# Patient Record
Sex: MALE | Race: OTHER | HISPANIC OR LATINO | URBAN - METROPOLITAN AREA
[De-identification: names, ages, dates, MRNs, and addresses within clinical notes are randomized per-mention and may not be internally consistent; named-entity substitution may affect disease eponyms.]

---

## 2021-04-13 ENCOUNTER — INPATIENT (INPATIENT)
Facility: HOSPITAL | Age: 81
LOS: 5 days | Discharge: ANOTHER HOSPITAL | DRG: 305 | End: 2021-04-19
Attending: INTERNAL MEDICINE | Admitting: INTERNAL MEDICINE
Payer: COMMERCIAL

## 2021-04-13 VITALS
SYSTOLIC BLOOD PRESSURE: 175 MMHG | DIASTOLIC BLOOD PRESSURE: 85 MMHG | RESPIRATION RATE: 18 BRPM | OXYGEN SATURATION: 98 % | HEART RATE: 77 BPM | TEMPERATURE: 98 F | HEIGHT: 60 IN

## 2021-04-13 DIAGNOSIS — R07.9 CHEST PAIN, UNSPECIFIED: ICD-10-CM

## 2021-04-13 DIAGNOSIS — I10 ESSENTIAL (PRIMARY) HYPERTENSION: ICD-10-CM

## 2021-04-13 DIAGNOSIS — Z29.9 ENCOUNTER FOR PROPHYLACTIC MEASURES, UNSPECIFIED: ICD-10-CM

## 2021-04-13 LAB
ALBUMIN SERPL ELPH-MCNC: 3.6 G/DL — SIGNIFICANT CHANGE UP (ref 3.5–5)
ALP SERPL-CCNC: 60 U/L — SIGNIFICANT CHANGE UP (ref 40–120)
ALT FLD-CCNC: 52 U/L DA — SIGNIFICANT CHANGE UP (ref 10–60)
ANION GAP SERPL CALC-SCNC: 5 MMOL/L — SIGNIFICANT CHANGE UP (ref 5–17)
AST SERPL-CCNC: 29 U/L — SIGNIFICANT CHANGE UP (ref 10–40)
BASOPHILS # BLD AUTO: 0 K/UL — SIGNIFICANT CHANGE UP (ref 0–0.2)
BASOPHILS NFR BLD AUTO: 0 % — SIGNIFICANT CHANGE UP (ref 0–2)
BILIRUB SERPL-MCNC: 0.5 MG/DL — SIGNIFICANT CHANGE UP (ref 0.2–1.2)
BUN SERPL-MCNC: 16 MG/DL — SIGNIFICANT CHANGE UP (ref 7–18)
CALCIUM SERPL-MCNC: 9 MG/DL — SIGNIFICANT CHANGE UP (ref 8.4–10.5)
CHLORIDE SERPL-SCNC: 106 MMOL/L — SIGNIFICANT CHANGE UP (ref 96–108)
CO2 SERPL-SCNC: 27 MMOL/L — SIGNIFICANT CHANGE UP (ref 22–31)
CREAT SERPL-MCNC: 0.81 MG/DL — SIGNIFICANT CHANGE UP (ref 0.5–1.3)
EOSINOPHIL # BLD AUTO: 0.12 K/UL — SIGNIFICANT CHANGE UP (ref 0–0.5)
EOSINOPHIL NFR BLD AUTO: 2 % — SIGNIFICANT CHANGE UP (ref 0–6)
GLUCOSE SERPL-MCNC: 119 MG/DL — HIGH (ref 70–99)
HCT VFR BLD CALC: 41.3 % — SIGNIFICANT CHANGE UP (ref 39–50)
HGB BLD-MCNC: 14.2 G/DL — SIGNIFICANT CHANGE UP (ref 13–17)
LYMPHOCYTES # BLD AUTO: 2.09 K/UL — SIGNIFICANT CHANGE UP (ref 1–3.3)
LYMPHOCYTES # BLD AUTO: 35 % — SIGNIFICANT CHANGE UP (ref 13–44)
MCHC RBC-ENTMCNC: 32.4 PG — SIGNIFICANT CHANGE UP (ref 27–34)
MCHC RBC-ENTMCNC: 34.4 GM/DL — SIGNIFICANT CHANGE UP (ref 32–36)
MCV RBC AUTO: 94.3 FL — SIGNIFICANT CHANGE UP (ref 80–100)
MONOCYTES # BLD AUTO: 0.54 K/UL — SIGNIFICANT CHANGE UP (ref 0–0.9)
MONOCYTES NFR BLD AUTO: 9 % — SIGNIFICANT CHANGE UP (ref 2–14)
NEUTROPHILS # BLD AUTO: 2.87 K/UL — SIGNIFICANT CHANGE UP (ref 1.8–7.4)
NEUTROPHILS NFR BLD AUTO: 48 % — SIGNIFICANT CHANGE UP (ref 43–77)
PLATELET # BLD AUTO: 152 K/UL — SIGNIFICANT CHANGE UP (ref 150–400)
POTASSIUM SERPL-MCNC: 3.7 MMOL/L — SIGNIFICANT CHANGE UP (ref 3.5–5.3)
POTASSIUM SERPL-SCNC: 3.7 MMOL/L — SIGNIFICANT CHANGE UP (ref 3.5–5.3)
PROT SERPL-MCNC: 7.9 G/DL — SIGNIFICANT CHANGE UP (ref 6–8.3)
RBC # BLD: 4.38 M/UL — SIGNIFICANT CHANGE UP (ref 4.2–5.8)
RBC # FLD: 13.3 % — SIGNIFICANT CHANGE UP (ref 10.3–14.5)
SARS-COV-2 RNA SPEC QL NAA+PROBE: SIGNIFICANT CHANGE UP
SODIUM SERPL-SCNC: 138 MMOL/L — SIGNIFICANT CHANGE UP (ref 135–145)
TROPONIN I SERPL-MCNC: <0.015 NG/ML — SIGNIFICANT CHANGE UP (ref 0–0.04)
WBC # BLD: 5.98 K/UL — SIGNIFICANT CHANGE UP (ref 3.8–10.5)
WBC # FLD AUTO: 5.98 K/UL — SIGNIFICANT CHANGE UP (ref 3.8–10.5)

## 2021-04-13 PROCEDURE — 71045 X-RAY EXAM CHEST 1 VIEW: CPT | Mod: 26

## 2021-04-13 PROCEDURE — 99285 EMERGENCY DEPT VISIT HI MDM: CPT

## 2021-04-13 RX ORDER — AMLODIPINE BESYLATE 2.5 MG/1
5 TABLET ORAL DAILY
Refills: 0 | Status: DISCONTINUED | OUTPATIENT
Start: 2021-04-13 | End: 2021-04-19

## 2021-04-13 RX ORDER — HYDRALAZINE HCL 50 MG
5 TABLET ORAL ONCE
Refills: 0 | Status: COMPLETED | OUTPATIENT
Start: 2021-04-13 | End: 2021-04-13

## 2021-04-13 RX ORDER — METOPROLOL TARTRATE 50 MG
12.5 TABLET ORAL EVERY 12 HOURS
Refills: 0 | Status: DISCONTINUED | OUTPATIENT
Start: 2021-04-13 | End: 2021-04-15

## 2021-04-13 RX ORDER — ASPIRIN/CALCIUM CARB/MAGNESIUM 324 MG
81 TABLET ORAL DAILY
Refills: 0 | Status: DISCONTINUED | OUTPATIENT
Start: 2021-04-13 | End: 2021-04-19

## 2021-04-13 RX ORDER — ATORVASTATIN CALCIUM 80 MG/1
40 TABLET, FILM COATED ORAL AT BEDTIME
Refills: 0 | Status: DISCONTINUED | OUTPATIENT
Start: 2021-04-13 | End: 2021-04-19

## 2021-04-13 RX ORDER — ENOXAPARIN SODIUM 100 MG/ML
40 INJECTION SUBCUTANEOUS DAILY
Refills: 0 | Status: DISCONTINUED | OUTPATIENT
Start: 2021-04-13 | End: 2021-04-19

## 2021-04-13 RX ADMIN — ATORVASTATIN CALCIUM 40 MILLIGRAM(S): 80 TABLET, FILM COATED ORAL at 22:23

## 2021-04-13 RX ADMIN — Medication 12.5 MILLIGRAM(S): at 21:55

## 2021-04-13 RX ADMIN — AMLODIPINE BESYLATE 5 MILLIGRAM(S): 2.5 TABLET ORAL at 19:54

## 2021-04-13 RX ADMIN — Medication 5 MILLIGRAM(S): at 19:54

## 2021-04-13 NOTE — ED ADULT NURSE NOTE - ED STAT RN HANDOFF DETAILS
Report given to ZURI Mendosa. Pt resting in bed, denies chest pain, no acute distress noted, no shortness of breath indicated.

## 2021-04-13 NOTE — ED ADULT TRIAGE NOTE - CHIEF COMPLAINT QUOTE
brought by grand daughter for high blood pressure this morning as per VNS and some chest discomforts x few days

## 2021-04-13 NOTE — ED ADULT NURSE REASSESSMENT NOTE - NS ED NURSE REASSESS COMMENT FT1
Enhanced supervision maintained as per order. Pt resting in bed, no acute distress noted, denies chest pain, no shortness of breath indicated. Will continue to monitor.

## 2021-04-13 NOTE — ED ADULT NURSE NOTE - OBJECTIVE STATEMENT
Pt brought by grand daughter for high blood pressure this morning as per VNS and some chest discomfort x few days. Upon assessment, pt is A&OX2 with confusion, pt disorientated to time and situation. Pt denies chest pain, no shortness of breath indicated.

## 2021-04-13 NOTE — H&P ADULT - NSHPPHYSICALEXAM_GEN_ALL_CORE
General - NAD  Eyes - PERRLA, EOM intact  ENT - Nonicteric sclerae, PERRLA, EOMI. Oropharynx clear. Moist mucous membranes. Conjunctivae appear well perfused.   Neck - No noticeable or palpable swelling, redness or rash around throat or on face  Lymph Nodes - No lymphadenopathy  Cardiovascular - RRR no m/r/g, no JVD, no carotid bruits  Lungs - Clear to ascultation, no use of accessory muscles, no crackles or wheezes.  Skin - No rashes, skin warm and dry, no erythematous areas  Abdomen - Normal bowel sounds, abdomen soft and nontender, no hepatosplenomegaly.  Extremities - No edema, cyanosis or clubbing  MusculoSkeletal - 5/5 strength, normal range of motion, no swollen or erythematous  joints.  Neurological – Alert and oriented x1 -2, CN 2-12 grossly intact.

## 2021-04-13 NOTE — ED PROVIDER NOTE - OBJECTIVE STATEMENT
81 y/o male h/o Alzheimer's disease, HTN, BIB granddaughter. Pt was getting his annual home visit when the nurse found him to have a BP in the 180's systolic. Pt told the nurse he was having chest pain but told his granddaughter he was not having chest pain. Pt has alzheimer's and HTN, and is a poor historian and cannot contribute to his own history. History obtained from granddaughter. Pt reports he feels fine now and is asking for a taxi to go home. Pt lives in a 2 family home and lives on the first floor alone, and his granddaughter lives on the second floor and generally cares for him. Pt is supposed to take his BP medications at home but has been refusing to take them for many years.

## 2021-04-13 NOTE — ED ADULT NURSE NOTE - ED STAT RN HANDOFF DETAILS 2
Endorsed from RN Nancy pt awake restless getting out of bed telebox at bedside, placed on pt,  pt removes box pt disoriented

## 2021-04-13 NOTE — H&P ADULT - PROBLEM SELECTOR PLAN 1
Pt reports chest pain since morning  Unlikely ACS  EKG NSR  Troponin negative  Tele monitoring  started on aspirin and statin  f/u serial troponin  f/u Echo  Cardio  Pt reports chest pain since morning  Unlikely ACS  EKG NSR  Troponin negative  Tele monitoring  started on aspirin, statin and lopressor  f/u serial troponin  f/u Echo  Cardio

## 2021-04-13 NOTE — H&P ADULT - ASSESSMENT
81 y/o M with PMH of Alzheimer's disease, HTN, arthritis, depression  was brought by granddaughter due to chest pain and elevated BP.       In ED, /85, HR 77

## 2021-04-13 NOTE — ED ADULT NURSE NOTE - NSIMPLEMENTINTERV_GEN_ALL_ED
Implemented All Fall with Harm Risk Interventions:  [place] to call system. Call bell, personal items and telephone within reach. Instruct patient to call for assistance. Room bathroom lighting operational. Non-slip footwear when patient is off stretcher. Physically safe environment: no spills, clutter or unnecessary equipment. Stretcher in lowest position, wheels locked, appropriate side rails in place. Provide visual cue, wrist band, yellow gown, etc. Monitor gait and stability. Monitor for mental status changes and reorient to person, place, and time. Review medications for side effects contributing to fall risk. Reinforce activity limits and safety measures with patient and family. Provide visual clues: red socks.

## 2021-04-13 NOTE — H&P ADULT - PROBLEM SELECTOR PLAN 2
p/w /85  Pt is not on BP medications for many years  Started on amlodipine 5 mg  f/u Echo   Monitor BP p/w /85  Pt is not on BP medications for many years  Started on amlodipine 5 mg and lopressor 12.5 bid  f/u Echo   Monitor BP

## 2021-04-13 NOTE — H&P ADULT - HISTORY OF PRESENT ILLNESS
81 y/o M with PMH of Alzheimer's disease, HTN, arthritis, depression  was brought by granddaughter due to chest pain and elevated BP. Pt is a poor historian due to dementia, history obtained from granddaughter. Per granddaughter she was informed by the visiting nurse that the BP was 188/90. Pt told the nurse he was having chest pain but told his granddaughter he was not having chest pain but reported shortness of breath. Pt lives in a 2 family home and lives on the first floor alone, and his granddaughter lives on the second floor and generally cares for him. Pt is supposed to take his BP medications at home but has been refusing to take them for many years.     In ED, /85, HR 77
No complaints

## 2021-04-14 DIAGNOSIS — G30.9 ALZHEIMER'S DISEASE, UNSPECIFIED: ICD-10-CM

## 2021-04-14 DIAGNOSIS — Z02.9 ENCOUNTER FOR ADMINISTRATIVE EXAMINATIONS, UNSPECIFIED: ICD-10-CM

## 2021-04-14 DIAGNOSIS — Z71.89 OTHER SPECIFIED COUNSELING: ICD-10-CM

## 2021-04-14 LAB
A1C WITH ESTIMATED AVERAGE GLUCOSE RESULT: 6.4 % — HIGH (ref 4–5.6)
ALBUMIN SERPL ELPH-MCNC: 3.5 G/DL — SIGNIFICANT CHANGE UP (ref 3.5–5)
ALP SERPL-CCNC: 64 U/L — SIGNIFICANT CHANGE UP (ref 40–120)
ALT FLD-CCNC: 47 U/L DA — SIGNIFICANT CHANGE UP (ref 10–60)
ANION GAP SERPL CALC-SCNC: 7 MMOL/L — SIGNIFICANT CHANGE UP (ref 5–17)
AST SERPL-CCNC: 20 U/L — SIGNIFICANT CHANGE UP (ref 10–40)
BASOPHILS # BLD AUTO: 0.02 K/UL — SIGNIFICANT CHANGE UP (ref 0–0.2)
BASOPHILS NFR BLD AUTO: 0.2 % — SIGNIFICANT CHANGE UP (ref 0–2)
BILIRUB SERPL-MCNC: 1 MG/DL — SIGNIFICANT CHANGE UP (ref 0.2–1.2)
BUN SERPL-MCNC: 10 MG/DL — SIGNIFICANT CHANGE UP (ref 7–18)
CALCIUM SERPL-MCNC: 8.6 MG/DL — SIGNIFICANT CHANGE UP (ref 8.4–10.5)
CHLORIDE SERPL-SCNC: 105 MMOL/L — SIGNIFICANT CHANGE UP (ref 96–108)
CHOLEST SERPL-MCNC: 157 MG/DL — SIGNIFICANT CHANGE UP
CO2 SERPL-SCNC: 25 MMOL/L — SIGNIFICANT CHANGE UP (ref 22–31)
COVID-19 SPIKE DOMAIN AB INTERP: NEGATIVE — SIGNIFICANT CHANGE UP
COVID-19 SPIKE DOMAIN ANTIBODY RESULT: 0.4 U/ML — SIGNIFICANT CHANGE UP
CREAT SERPL-MCNC: 0.83 MG/DL — SIGNIFICANT CHANGE UP (ref 0.5–1.3)
EOSINOPHIL # BLD AUTO: 0.05 K/UL — SIGNIFICANT CHANGE UP (ref 0–0.5)
EOSINOPHIL NFR BLD AUTO: 0.5 % — SIGNIFICANT CHANGE UP (ref 0–6)
ESTIMATED AVERAGE GLUCOSE: 137 MG/DL — HIGH (ref 68–114)
GLUCOSE SERPL-MCNC: 163 MG/DL — HIGH (ref 70–99)
HCT VFR BLD CALC: 42.7 % — SIGNIFICANT CHANGE UP (ref 39–50)
HDLC SERPL-MCNC: 49 MG/DL — SIGNIFICANT CHANGE UP
HGB BLD-MCNC: 14.5 G/DL — SIGNIFICANT CHANGE UP (ref 13–17)
IMM GRANULOCYTES NFR BLD AUTO: 0.5 % — SIGNIFICANT CHANGE UP (ref 0–1.5)
LIPID PNL WITH DIRECT LDL SERPL: 86 MG/DL — SIGNIFICANT CHANGE UP
LYMPHOCYTES # BLD AUTO: 1.57 K/UL — SIGNIFICANT CHANGE UP (ref 1–3.3)
LYMPHOCYTES # BLD AUTO: 15.7 % — SIGNIFICANT CHANGE UP (ref 13–44)
MAGNESIUM SERPL-MCNC: 2.3 MG/DL — SIGNIFICANT CHANGE UP (ref 1.6–2.6)
MCHC RBC-ENTMCNC: 32.4 PG — SIGNIFICANT CHANGE UP (ref 27–34)
MCHC RBC-ENTMCNC: 34 GM/DL — SIGNIFICANT CHANGE UP (ref 32–36)
MCV RBC AUTO: 95.3 FL — SIGNIFICANT CHANGE UP (ref 80–100)
MONOCYTES # BLD AUTO: 1.09 K/UL — HIGH (ref 0–0.9)
MONOCYTES NFR BLD AUTO: 10.9 % — SIGNIFICANT CHANGE UP (ref 2–14)
NEUTROPHILS # BLD AUTO: 7.25 K/UL — SIGNIFICANT CHANGE UP (ref 1.8–7.4)
NEUTROPHILS NFR BLD AUTO: 72.2 % — SIGNIFICANT CHANGE UP (ref 43–77)
NON HDL CHOLESTEROL: 108 MG/DL — SIGNIFICANT CHANGE UP
NRBC # BLD: 0 /100 WBCS — SIGNIFICANT CHANGE UP (ref 0–0)
PHOSPHATE SERPL-MCNC: 1.7 MG/DL — LOW (ref 2.5–4.5)
PLATELET # BLD AUTO: 153 K/UL — SIGNIFICANT CHANGE UP (ref 150–400)
POTASSIUM SERPL-MCNC: 3.6 MMOL/L — SIGNIFICANT CHANGE UP (ref 3.5–5.3)
POTASSIUM SERPL-SCNC: 3.6 MMOL/L — SIGNIFICANT CHANGE UP (ref 3.5–5.3)
PROT SERPL-MCNC: 7.7 G/DL — SIGNIFICANT CHANGE UP (ref 6–8.3)
RBC # BLD: 4.48 M/UL — SIGNIFICANT CHANGE UP (ref 4.2–5.8)
RBC # FLD: 13.3 % — SIGNIFICANT CHANGE UP (ref 10.3–14.5)
SARS-COV-2 IGG+IGM SERPL QL IA: 0.4 U/ML — SIGNIFICANT CHANGE UP
SARS-COV-2 IGG+IGM SERPL QL IA: NEGATIVE — SIGNIFICANT CHANGE UP
SODIUM SERPL-SCNC: 137 MMOL/L — SIGNIFICANT CHANGE UP (ref 135–145)
TRIGL SERPL-MCNC: 108 MG/DL — SIGNIFICANT CHANGE UP
TROPONIN I SERPL-MCNC: 0.02 NG/ML — SIGNIFICANT CHANGE UP (ref 0–0.04)
TROPONIN I SERPL-MCNC: 0.04 NG/ML — SIGNIFICANT CHANGE UP (ref 0–0.04)
TSH SERPL-MCNC: 3.62 UU/ML — SIGNIFICANT CHANGE UP (ref 0.34–4.82)
WBC # BLD: 10.03 K/UL — SIGNIFICANT CHANGE UP (ref 3.8–10.5)
WBC # FLD AUTO: 10.03 K/UL — SIGNIFICANT CHANGE UP (ref 3.8–10.5)

## 2021-04-14 RX ORDER — SODIUM,POTASSIUM PHOSPHATES 278-250MG
1 POWDER IN PACKET (EA) ORAL
Refills: 0 | Status: COMPLETED | OUTPATIENT
Start: 2021-04-14 | End: 2021-04-15

## 2021-04-14 RX ORDER — PANTOPRAZOLE SODIUM 20 MG/1
40 TABLET, DELAYED RELEASE ORAL
Refills: 0 | Status: DISCONTINUED | OUTPATIENT
Start: 2021-04-14 | End: 2021-04-19

## 2021-04-14 RX ORDER — HALOPERIDOL DECANOATE 100 MG/ML
2 INJECTION INTRAMUSCULAR ONCE
Refills: 0 | Status: COMPLETED | OUTPATIENT
Start: 2021-04-14 | End: 2021-04-14

## 2021-04-14 RX ADMIN — ENOXAPARIN SODIUM 40 MILLIGRAM(S): 100 INJECTION SUBCUTANEOUS at 11:15

## 2021-04-14 RX ADMIN — Medication 1 TABLET(S): at 16:48

## 2021-04-14 RX ADMIN — Medication 81 MILLIGRAM(S): at 11:14

## 2021-04-14 RX ADMIN — Medication 1 TABLET(S): at 17:07

## 2021-04-14 RX ADMIN — HALOPERIDOL DECANOATE 2 MILLIGRAM(S): 100 INJECTION INTRAMUSCULAR at 16:50

## 2021-04-14 RX ADMIN — Medication 12.5 MILLIGRAM(S): at 05:17

## 2021-04-14 RX ADMIN — Medication 12.5 MILLIGRAM(S): at 17:06

## 2021-04-14 NOTE — PROGRESS NOTE ADULT - SUBJECTIVE AND OBJECTIVE BOX
NP Note discussed with  Primary Attending    Patient is a 80y old  Male who presents with a chief complaint of Elevated BP and chest pain (13 Apr 2021 19:19)      INTERVAL HPI/OVERNIGHT EVENTS: Patient seen and examined at bedside. Patient pleasantly confused, denies chest pain, no new complaints    MEDICATIONS  (STANDING):  amLODIPine   Tablet 5 milliGRAM(s) Oral daily  aspirin  chewable 81 milliGRAM(s) Oral daily  atorvastatin 40 milliGRAM(s) Oral at bedtime  enoxaparin Injectable 40 milliGRAM(s) SubCutaneous daily  metoprolol tartrate 12.5 milliGRAM(s) Oral every 12 hours    MEDICATIONS  (PRN):      __________________________________________________  REVIEW OF SYSTEMS:    CONSTITUTIONAL: No fever,   EYES: no acute visual disturbances  NECK: No pain or stiffness  RESPIRATORY: No cough; No shortness of breath  CARDIOVASCULAR: No chest pain, no palpitations  GASTROINTESTINAL: No pain. No nausea or vomiting; No diarrhea   NEUROLOGICAL: No headache or numbness, no tremors  MUSCULOSKELETAL: No joint pain, no muscle pain  GENITOURINARY: no dysuria, no frequency, no hesitancy  PSYCHIATRY: no depression , no anxiety  ALL OTHER  ROS negative        Vital Signs Last 24 Hrs  T(C): 36.4 (14 Apr 2021 12:28), Max: 37 (13 Apr 2021 23:21)  T(F): 97.5 (14 Apr 2021 12:28), Max: 98.6 (13 Apr 2021 23:21)  HR: 73 (14 Apr 2021 12:55) (68 - 159)  BP: 159/68 (14 Apr 2021 12:28) (154/72 - 201/87)  BP(mean): --  RR: 17 (14 Apr 2021 12:28) (17 - 18)  SpO2: 99% (14 Apr 2021 12:28) (98% - 99%)    ________________________________________________  PHYSICAL EXAM:  GENERAL: NAD  HEENT: Normocephalic;  conjunctivae and sclerae clear; moist mucous membranes;   NECK : supple  CHEST/LUNG: Clear to auscultation bilaterally with good air entry   HEART: S1 S2  regular; no murmurs, gallops or rubs  ABDOMEN: Soft, Nontender, Nondistended; Bowel sounds present  EXTREMITIES: no cyanosis; no edema; no calf tenderness  SKIN: warm and dry; no rash  NERVOUS SYSTEM:  Awake and alert; Oriented  to place, person and time ; no new deficits    _________________________________________________  LABS:                        14.5   10.03 )-----------( 153      ( 14 Apr 2021 05:49 )             42.7     04-14    137  |  105  |  10  ----------------------------<  163<H>  3.6   |  25  |  0.83    Ca    8.6      14 Apr 2021 05:49  Phos  1.7     04-14  Mg     2.3     04-14    TPro  7.7  /  Alb  3.5  /  TBili  1.0  /  DBili  x   /  AST  20  /  ALT  47  /  AlkPhos  64  04-14        CAPILLARY BLOOD GLUCOSE            RADIOLOGY & ADDITIONAL TESTS:    Imaging  Reviewed:  YES/NO    Consultant(s) Notes Reviewed:   YES/ No      Plan of care was discussed with patient and /or primary care giver; all questions and concerns were addressed  NP Note discussed with  Primary Attending    Patient is a 80y old  Male who presents with a chief complaint of Elevated BP and chest pain (13 Apr 2021 19:19)      INTERVAL HPI/OVERNIGHT EVENTS: Patient seen and examined at bedside. Patient pleasantly confused, denies chest pain, fever, palpitations, no new complaints    MEDICATIONS  (STANDING):  amLODIPine   Tablet 5 milliGRAM(s) Oral daily  aspirin  chewable 81 milliGRAM(s) Oral daily  atorvastatin 40 milliGRAM(s) Oral at bedtime  enoxaparin Injectable 40 milliGRAM(s) SubCutaneous daily  metoprolol tartrate 12.5 milliGRAM(s) Oral every 12 hours    MEDICATIONS  (PRN):      __________________________________________________  REVIEW OF SYSTEMS:    CONSTITUTIONAL: No fever,   EYES: no acute visual disturbances  NECK: No pain or stiffness  RESPIRATORY: No cough; No shortness of breath  CARDIOVASCULAR: No chest pain, no palpitations  GASTROINTESTINAL: No pain. No nausea or vomiting; No diarrhea   NEUROLOGICAL: No headache or numbness, no tremors  MUSCULOSKELETAL: No joint pain, no muscle pain  GENITOURINARY: no dysuria, no frequency, no hesitancy  PSYCHIATRY: no depression , no anxiety  ALL OTHER  ROS negative        Vital Signs Last 24 Hrs  T(C): 36.4 (14 Apr 2021 12:28), Max: 37 (13 Apr 2021 23:21)  T(F): 97.5 (14 Apr 2021 12:28), Max: 98.6 (13 Apr 2021 23:21)  HR: 73 (14 Apr 2021 12:55) (68 - 159)  BP: 159/68 (14 Apr 2021 12:28) (154/72 - 201/87)  BP(mean): --  RR: 17 (14 Apr 2021 12:28) (17 - 18)  SpO2: 99% (14 Apr 2021 12:28) (98% - 99%)    ________________________________________________  PHYSICAL EXAM:  GENERAL: NAD  HEENT: Normocephalic;  conjunctivae and sclerae clear; moist mucous membranes;   NECK : supple  CHEST/LUNG: Clear to auscultation bilaterally with good air entry   HEART: S1 S2  regular; no murmurs, gallops or rubs  ABDOMEN: Soft, Nontender, Nondistended; Bowel sounds present  EXTREMITIES: no cyanosis; no edema; no calf tenderness  SKIN: warm and dry; no rash  NERVOUS SYSTEM:  Awake and alert; Oriented  to person, pleasantly confused    _________________________________________________  LABS:                        14.5   10.03 )-----------( 153      ( 14 Apr 2021 05:49 )             42.7     04-14    137  |  105  |  10  ----------------------------<  163<H>  3.6   |  25  |  0.83    Ca    8.6      14 Apr 2021 05:49  Phos  1.7     04-14  Mg     2.3     04-14    TPro  7.7  /  Alb  3.5  /  TBili  1.0  /  DBili  x   /  AST  20  /  ALT  47  /  AlkPhos  64  04-14        CAPILLARY BLOOD GLUCOSE    RADIOLOGY & ADDITIONAL TESTS:  < from: Xray Chest 1 View- PORTABLE-Urgent (04.13.21 @ 18:27) >  EXAM:  XR CHEST PORTABLE URGENT 1V                            PROCEDURE DATE:  04/13/2021          INTERPRETATION:  AP chest on April 13, 2021 at 6:33 PM. Patient has chest pain.    Patient is also hypertensive.    COMPARISON: None available.    Heart size is within normal limits.    The lung fields and pleural surfaces are unremarkable.    IMPRESSION: Negative chest.    < end of copied text >    Imaging  Reviewed:  YES    Consultant(s) Notes Reviewed:   YES      Plan of care was discussed with patient and /or primary care giver; all questions and concerns were addressed

## 2021-04-14 NOTE — PATIENT PROFILE ADULT - STATED REASON FOR ADMISSION
Granddaughter reports that patient ran out of medications and has been refusing to go to the doctor.

## 2021-04-14 NOTE — PROGRESS NOTE ADULT - CONVERSATION DETAILS
Spoke in full detail with Kirsten Ruano [phone]. Patient daughter expressed patient did not want to be resuscitated and ventilated. Patient daughter express for limited intervention with trial of IV fluid, use antibiotics as medically warranted, no feeding tube. MOLST in chart. explained that MOLST can be modified at anytime.

## 2021-04-14 NOTE — PATIENT PROFILE ADULT - NSPROHMSYMPCOND_GEN_A_NUR
granddaughter reports refusal to go to the doctor and take medications./cardiovascular granddaughter reports refusal to go to the doctor and take medications./cardiovascular/diabetes

## 2021-04-14 NOTE — PROGRESS NOTE ADULT - PROBLEM SELECTOR PLAN 5
-PT consult pending  -f/u stress test and ECHO -PT consult pending  -f/u stress test and ECHO  -Pharmacy information up to date. Patient has not been on medication as patient has refused to take medication.

## 2021-04-14 NOTE — PATIENT PROFILE ADULT - NSPROGENSOURCEINFO_GEN_A_NUR
patient approached but was only oriented to person and had no understanding of why he was in the hospital./family

## 2021-04-14 NOTE — PROGRESS NOTE ADULT - NS PRO AD PATIENT TYPE
Pt here today for teach and simulation scan. Radiation and You information provided along with additional education regarding radiation therapy and what to expect. Pt verbalizes understanding and all questions answered to the best of my knowledge. Samples of aquaphor and community resource information provided. Pt escorted to CT room without any difficulty. IV consent reviewed and signed by pt. Checklist reviewed with pt. IV accessed attempted 3 times. Blood return was obtained but vein blew when saline was instilled each time. Dr Todd Loomis notified and d/c'd order for IV contrast.  SIM completed. Do Not Resuscitate (DNR)/Medical Orders for Life-Sustaining Treatment (MOLST)

## 2021-04-14 NOTE — PATIENT PROFILE ADULT - LIFE CHALLENGES - DETAILS
Granddaughter reports that her mother, who has POA, is in conflict with her. She is requesting a call from a  to discuss issues as patient lives with her she is his caregiver.

## 2021-04-14 NOTE — PATIENT PROFILE ADULT - NSPROMEDSADMININFO_GEN_A_NUR
granddaughter reports difficulty administering medications and that patient outrightly refuse at times. Presently ran pout of medication.

## 2021-04-14 NOTE — PROGRESS NOTE ADULT - PROBLEM SELECTOR PLAN 3
RISK                                                          Points  [  ] Previous VTE                                                3  [  ] Thrombophilia                                             2  [  ] Lower limb paralysis                                   2        (unable to hold up >15 seconds)    [  ] Current Cancer                                             2         (within 6 months)  [ x ] Immobilization > 24 hrs                              1  [  ] ICU/CCU stay > 24 hours                             1  [ x ] Age > 60                                                         1    IMPROVE VTE Score: 2  lovenox for VTE prophylaxis. -DVT PPX: Lovenox  -GI PPX: PPI -sundowning-delirium-attempting to get OOB without assistance  -Haldol 2mg IVP x 1  -enhanced supervision -Patient not on medication  -sundowning-delirium-attempting to get OOB without assistance  -Haldol 2mg IVP x 1  -enhanced supervision

## 2021-04-14 NOTE — PATIENT PROFILE ADULT - INTERPRETATION SERVICES DECLINED
patient approached but was only oriented to person and had no understanding of why he was in the hospital.

## 2021-04-14 NOTE — PROGRESS NOTE ADULT - PROBLEM SELECTOR PLAN 2
p/w /85  Pt is not on BP medications for many years  Started on amlodipine 5 mg and lopressor 12.5 bid  f/u Echo   Monitor BP Pt is not on BP medications for many years  -c/w amlodipine 5 mg   -c/w lopressor 12.5 bid

## 2021-04-14 NOTE — PROGRESS NOTE ADULT - SUBJECTIVE AND OBJECTIVE BOX
Patient was set up in mid December to set up an Exam under anesthesia with Dr. Ruiz for January.   This patient was  approved by surgery education nurses for meet and greet exam under anesthesia with a pre op needed from PCP Dr. Escobedo.  However, patient was seen by Dr. Ruiz  for his3 month follow up to discuss the exam.   Dr. 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 Patient pleasantly confused, denies chest pain, fever, palpitations, no new complaints    MEDICATIONS  (STANDING):  amLODIPine   Tablet 5 milliGRAM(s) Oral daily  aspirin  chewable 81 milliGRAM(s) Oral daily  atorvastatin 40 milliGRAM(s) Oral at bedtime  enoxaparin Injectable 40 milliGRAM(s) SubCutaneous daily  metoprolol tartrate 12.5 milliGRAM(s) Oral every 12 hours    MEDICATIONS  (PRN):      __________________________________________________  REVIEW OF SYSTEMS:    CONSTITUTIONAL: No fever,   EYES: no acute visual disturbances  NECK: No pain or stiffness  RESPIRATORY: No cough; No shortness of breath  CARDIOVASCULAR: No chest pain, no palpitations  GASTROINTESTINAL: No pain. No nausea or vomiting; No diarrhea   NEUROLOGICAL: No headache or numbness, no tremors  MUSCULOSKELETAL: No joint pain, no muscle pain  GENITOURINARY: no dysuria, no frequency, no hesitancy  PSYCHIATRY: no depression , no anxiety  ALL OTHER  ROS negative        Vital Signs Last 24 Hrs  T(C): 36.4 (14 Apr 2021 12:28), Max: 37 (13 Apr 2021 23:21)  T(F): 97.5 (14 Apr 2021 12:28), Max: 98.6 (13 Apr 2021 23:21)  HR: 73 (14 Apr 2021 12:55) (68 - 159)  BP: 159/68 (14 Apr 2021 12:28) (154/72 - 201/87)  BP(mean): --  RR: 17 (14 Apr 2021 12:28) (17 - 18)  SpO2: 99% (14 Apr 2021 12:28) (98% - 99%)    ________________________________________________  PHYSICAL EXAM:  GENERAL: NAD  HEENT: Normocephalic;  conjunctivae and sclerae clear; moist mucous membranes;   NECK : supple  CHEST/LUNG: Clear to auscultation bilaterally with good air entry   HEART: S1 S2  regular; no murmurs, gallops or rubs  ABDOMEN: Soft, Nontender, Nondistended; Bowel sounds present  EXTREMITIES: no cyanosis; no edema; no calf tenderness  SKIN: warm and dry; no rash  NERVOUS SYSTEM:  Awake and alert; Oriented  to person, pleasantly confused    _________________________________________________  LABS:                        14.5   10.03 )-----------( 153      ( 14 Apr 2021 05:49 )             42.7     04-14    137  |  105  |  10  ----------------------------<  163<H>  3.6   |  25  |  0.83    Ca    8.6      14 Apr 2021 05:49  Phos  1.7     04-14  Mg     2.3     04-14    TPro  7.7  /  Alb  3.5  /  TBili  1.0  /  DBili  x   /  AST  20  /  ALT  47  /  AlkPhos  64  04-14        CAPILLARY BLOOD GLUCOSE    RADIOLOGY & ADDITIONAL TESTS:  < from: Xray Chest 1 View- PORTABLE-Urgent (04.13.21 @ 18:27) >  EXAM:  XR CHEST PORTABLE URGENT 1V                            PROCEDURE DATE:  04/13/2021          INTERPRETATION:  AP chest on April 13, 2021 at 6:33 PM. Patient has chest pain.    Patient is also hypertensive.    COMPARISON: None available.    Heart size is within normal limits.    The lung fields and pleural surfaces are unremarkable.    IMPRESSION: Negative chest.    < end of copied text >    Imaging  Reviewed:  YES    Consultant(s) Notes Reviewed:   YES      Plan of care was discussed with patient and /or primary care giver; all questions and concerns were addressed     Goals of Care:    GOALS OF CARE:  · Participants	Family  · Child(andrez)	Kirsten Ruano     Advance Directives:  · Does patient have Advance Directive	Yes   · Indicate Type	Do Not Resuscitate (DNR); Medical Orders for Life-Sustaining Treatment (MOLST)  · Are any of the items on the chart	Yes   · Specify which ones are on chart	Do Not Resuscitate (DNR)  Medical Orders for Life-Sustaining Treatment (MOLST)   · Does Patient Have a Surrogate	Yes   · Surrogate's Name	Kirsten Ruano     Conversation Discussion:  · Conversation	MOLST Discussed  · Conversation Details	Spoke in full detail with Kirsten Ruano [phone]. Patient daughter expressed patient did not want to be resuscitated and ventilated. Patient daughter express for limited intervention with trial of IV fluid, use antibiotics as medically warranted, no feeding tube. MOLST in chart. explained that MOLST can be modified at anytime.     Treatment Guidelines:  · Decision Maker	Surrogate  · Treatment Guidelines	DNR Order; IV fluid trial     MOLST:  · Completed	14-Apr-2021  · Updated	14-Apr-2021     Location of Discussion:  · Location of discussion	Telephone      Duration of Advanced Care Planning Meeting:  · Time spent (in minutes)	60     Assessment and Plan:   · Assessment	  Patient is a 79 y/o Male from home with PMH of Alzheimer's disease, HTN, arthritis, depression  was brought by granddaughter due to chest pain and elevated BP. Patient admitted to telemetry for chest pain r/o ACS.           Problem/Plan - 1:  ·  Problem: Chest pain.  Plan: -possible ischemic demand  -EKG NSR  -Troponin negative  -c/w Tele monitoring  -c/w aspirin, statin and lopressor  -f/u Echo, ECHO department aware  -f/u stress test  -Cardio Dr. Garcia.      Problem/Plan - 2:  ·  Problem: HTN (hypertension).  Plan: Pt is not on BP medications for many years  -c/w amlodipine 5 mg   -c/w lopressor 12.5 bid.      Problem/Plan - 3:  ·  Problem: Alzheimer's disease.  Plan: -sundowning-delirium-attempting to get OOB without assistance  -Haldol 2mg IVP x 1  -enhanced supervision.      Problem/Plan - 4:  ·  Problem: Need for prophylactic measure.  Plan: -DVT PPX: Lovenox  -GI PPX: PPI.      Problem/Plan - 5:  ·  Problem: Discharge planning issues.  Plan: -PT consult pending  -f/u stress test and ECHO.      Problem/Plan - 6:  Problem: Goals of care, counseling/discussion. Plan: DNR/DNI  MOLST in chart.

## 2021-04-14 NOTE — PROGRESS NOTE ADULT - ASSESSMENT
Patient is a 79 y/o Male from home with PMH of Alzheimer's disease, HTN, arthritis, depression  was brought by granddaughter due to chest pain and elevated BP. Patient admitted to telemetry for chest pain likely to       In ED, /85, HR 77 Patient is a 81 y/o Male from home with PMH of Alzheimer's disease, HTN, arthritis, depression  was brought by granddaughter due to chest pain and elevated BP. Patient admitted to telemetry for chest pain possible       In ED, /85, HR 77 Patient is a 79 y/o Male from home with PMH of Alzheimer's disease, HTN, arthritis, depression  was brought by granddaughter due to chest pain and elevated BP. Patient admitted to telemetry for chest pain r/o ACS.

## 2021-04-14 NOTE — PROGRESS NOTE ADULT - PROBLEM SELECTOR PLAN 1
Pt reports chest pain since morning  Unlikely ACS  EKG NSR  Troponin negative  Tele monitoring  started on aspirin, statin and lopressor  f/u serial troponin  f/u Echo  Cardio  -possible ischemic demand  -EKG NSR  -Troponin negative  -c/w Tele monitoring  -c/w aspirin, statin and lopressor  -f/u Echo, ECHO department aware  -f/u stress test  -Cardio Dr. Garcia

## 2021-04-14 NOTE — ED ADULT NURSE REASSESSMENT NOTE - NS ED NURSE REASSESS COMMENT FT1
Phone call made to grand daughter Reanna to number on file to comfort pt. Pt insisting family does not know where he is.

## 2021-04-15 PROCEDURE — 93306 TTE W/DOPPLER COMPLETE: CPT | Mod: 26

## 2021-04-15 RX ORDER — HALOPERIDOL DECANOATE 100 MG/ML
1 INJECTION INTRAMUSCULAR ONCE
Refills: 0 | Status: COMPLETED | OUTPATIENT
Start: 2021-04-15 | End: 2021-04-15

## 2021-04-15 RX ORDER — METOPROLOL TARTRATE 50 MG
25 TABLET ORAL
Refills: 0 | Status: DISCONTINUED | OUTPATIENT
Start: 2021-04-15 | End: 2021-04-19

## 2021-04-15 RX ADMIN — ENOXAPARIN SODIUM 40 MILLIGRAM(S): 100 INJECTION SUBCUTANEOUS at 13:00

## 2021-04-15 RX ADMIN — AMLODIPINE BESYLATE 5 MILLIGRAM(S): 2.5 TABLET ORAL at 05:33

## 2021-04-15 RX ADMIN — Medication 12.5 MILLIGRAM(S): at 05:34

## 2021-04-15 RX ADMIN — Medication 1 TABLET(S): at 00:21

## 2021-04-15 RX ADMIN — Medication 1 TABLET(S): at 13:44

## 2021-04-15 RX ADMIN — Medication 1 TABLET(S): at 05:34

## 2021-04-15 RX ADMIN — ATORVASTATIN CALCIUM 40 MILLIGRAM(S): 80 TABLET, FILM COATED ORAL at 21:45

## 2021-04-15 RX ADMIN — Medication 81 MILLIGRAM(S): at 13:00

## 2021-04-15 RX ADMIN — Medication 25 MILLIGRAM(S): at 18:04

## 2021-04-15 RX ADMIN — PANTOPRAZOLE SODIUM 40 MILLIGRAM(S): 20 TABLET, DELAYED RELEASE ORAL at 06:46

## 2021-04-15 RX ADMIN — HALOPERIDOL DECANOATE 1 MILLIGRAM(S): 100 INJECTION INTRAMUSCULAR at 18:02

## 2021-04-15 NOTE — CONSULT NOTE ADULT - SUBJECTIVE AND OBJECTIVE BOX
CHIEF COMPLAINT:Patient is a 80y old  Male who presents with a chief complaint of Elevated BP and chest pain .      HPI:  79 y/o M with PMH of Alzheimer's disease, HTN, arthritis, depression  was brought by granddaughter due to chest pain and elevated BP. Pt is a poor historian due to dementia, history obtained from granddaughter. Per granddaughter she was informed by the visiting nurse that the BP was 188/90. Pt told the nurse he was having chest pain but told his granddaughter he was not having chest pain but reported shortness of breath. Pt lives in a 2 family home and lives on the first floor alone, and his granddaughter lives on the second floor and generally cares for him. Pt is supposed to take his BP medications at home but has been refusing to take them for many years.     In ED, /85, HR 77 (13 Apr 2021 19:19)      PAST MEDICAL & SURGICAL HISTORY:  Alzheimerdisease    HTN (hypertension)    OA    Depression    MEDICATIONS  (STANDING):  amLODIPine   Tablet 5 milliGRAM(s) Oral daily  aspirin  chewable 81 milliGRAM(s) Oral daily  atorvastatin 40 milliGRAM(s) Oral at bedtime  enoxaparin Injectable 40 milliGRAM(s) SubCutaneous daily  metoprolol tartrate 12.5 milliGRAM(s) Oral every 12 hours  pantoprazole    Tablet 40 milliGRAM(s) Oral before breakfast  potassium phosphate / sodium phosphate Tablet (K-PHOS No. 2) 1 Tablet(s) Oral four times a day    MEDICATIONS  (PRN):      FAMILY HISTORY:No hx of cad      SOCIAL HISTORY:    [x ] Non-smoker    [x ] Alcohol-denies    Allergies    No Known Allergies    Intolerances    	    REVIEW OF SYSTEMS:  CONSTITUTIONAL: No fever, weight loss, or fatigue  EYES: No eye pain, visual disturbances, or discharge  ENT:  No difficulty hearing, tinnitus, vertigo; No sinus or throat pain  NECK: No pain or stiffness  RESPIRATORY: No cough, wheezing, chills or hemoptysis; No Shortness of Breath  CARDIOVASCULAR: + chest pain, No palpitations, passing out, dizziness, or leg swelling  GASTROINTESTINAL: No abdominal or epigastric pain. No nausea, vomiting, or hematemesis; No diarrhea or constipation. No melena or hematochezia.  GENITOURINARY: No dysuria, frequency, hematuria, or incontinence  NEUROLOGICAL: No headaches, memory loss, loss of strength, numbness, or tremors  SKIN: No itching, burning, rashes, or lesions   LYMPH Nodes: No enlarged glands  ENDOCRINE: No heat or cold intolerance; No hair loss  MUSCULOSKELETAL: No joint pain or swelling; No muscle, back, or extremity pain  PSYCHIATRIC: No depression, anxiety, mood swings, or difficulty sleeping  HEME/LYMPH: No easy bruising, or bleeding gums  ALLERGY AND IMMUNOLOGIC: No hives or eczema	      PHYSICAL EXAM:  T(C): 36.9 (04-15-21 @ 06:37), Max: 36.9 (04-15-21 @ 05:30)  HR: 67 (04-15-21 @ 06:37) (63 - 159)  BP: 160/73 (04-15-21 @ 06:37) (123/74 - 170/84)  RR: 17 (04-15-21 @ 06:37) (17 - 18)  SpO2: 97% (04-15-21 @ 06:37) (97% - 100%)        Appearance: Normal	  HEENT:   Normal oral mucosa, PERRL, EOMI	  Lymphatic: No lymphadenopathy  Cardiovascular: Normal S1 S2,2/6sm  Respiratory: Lungs clear to auscultation	  Gastrointestinal:  Soft, Non-tender, + BS	  Skin: No rashes, No ecchymoses, No cyanosis	  Neurologic: Non-focal  Extremities: Normal range of motion, No clubbing, cyanosis or edema  Vascular: Peripheral pulses palpable 2+ bilaterally    	    ECG:    Normal sinus rhythm  Nonspecific ST and T wave abnormality        LABS:	 	  CARDIAC MARKERS ( 14 Apr 2021 05:49 )  0.017 ng/mL / x     / x     / x     / x      CARDIAC MARKERS ( 13 Apr 2021 23:54 )  0.037 ng/mL / x     / x     / x     / x      CARDIAC MARKERS ( 13 Apr 2021 17:33 )  <0.015 ng/mL / x     / x     / x     / x                                  14.5   10.03 )-----------( 153      ( 14 Apr 2021 05:49 )             42.7     04-14    137  |  105  |  10  ----------------------------<  163<H>  3.6   |  25  |  0.83    Ca    8.6      14 Apr 2021 05:49  Phos  1.7     04-14  Mg     2.3     04-14    TPro  7.7  /  Alb  3.5  /  TBili  1.0  /  DBili  x   /  AST  20  /  ALT  47  /  AlkPhos  64  04-14      EXAM:  XR CHEST PORTABLE URGENT 1V                            PROCEDURE DATE:  04/13/2021          INTERPRETATION:  AP chest on April 13, 2021 at 6:33 PM. Patient has chest pain.    Patient is also hypertensive.    COMPARISON: None available.    Heart size is within normal limits.    The lung fields and pleural surfaces are unremarkable.    IMPRESSION: Negative chest.

## 2021-04-15 NOTE — PROGRESS NOTE ADULT - PROBLEM SELECTOR PLAN 1
- possible ischemic demand  - EKG NSR  - Troponin negative  - c/w Tele monitoring  - c/w aspirin, statin and lopressor  - f/u Echo,  - Stress test tomorrow AM  -Cardio Dr. Garcia

## 2021-04-15 NOTE — PROGRESS NOTE ADULT - PROBLEM SELECTOR PLAN 3
- Patient not on medication  - sundowning-delirium-attempting to get OOB without assistance  - s/p Haldol 2mg IVP x 1  - enhanced supervision

## 2021-04-15 NOTE — PROGRESS NOTE ADULT - ASSESSMENT
Patient is a 81 y/o Male from home with PMH of Alzheimer's disease, HTN, arthritis, depression  was brought by granddaughter due to chest pain and elevated BP. Patient admitted to telemetry for chest pain r/o ACS.

## 2021-04-15 NOTE — PHYSICAL THERAPY INITIAL EVALUATION ADULT - RANGE OF MOTION EXAMINATION, REHAB EVAL
Current Facility-Administered Medications   Medication Dose Route Frequency Provider Last Rate Last Dose    buPROPion (WELLBUTRIN XL) extended release tablet 150 mg  150 mg Oral BID Shelby Cowden Mihok, DO   150 mg at 19 0807    sertraline (ZOLOFT) tablet 50 mg  50 mg Oral Daily Thompson Alejandrek, DO   50 mg at 19 0807    oxyCODONE-acetaminophen (PERCOCET) 5-325 MG per tablet 1 tablet  1 tablet Oral Q4H PRN Shelby Cowden Mihok, DO   1 tablet at 19 2139    lisinopril (PRINIVIL;ZESTRIL) tablet 20 mg  20 mg Oral Daily Thompson Alejandrek, DO   20 mg at 19 7707    And    hydrochlorothiazide (HYDRODIURIL) tablet 12.5 mg  12.5 mg Oral Daily Shelby Cowden Mihok, DO   12.5 mg at 19 0807    pantoprazole (PROTONIX) tablet 40 mg  40 mg Oral QAM AC Thompson Alejandrek, DO   40 mg at 19 0807    ondansetron (ZOFRAN) injection 4 mg  4 mg Intravenous Q6H PRN Shelby Cowden Mihok, DO   4 mg at 19 1821    enoxaparin (LOVENOX) injection 50 mg  50 mg Subcutaneous Daily Thompson Rodgers, DO   50 mg at 19 1956     Scheduled Meds:   buPROPion  150 mg Oral BID    sertraline  50 mg Oral Daily    lisinopril  20 mg Oral Daily    And    hydrochlorothiazide  12.5 mg Oral Daily    pantoprazole  40 mg Oral QAM AC    enoxaparin  50 mg Subcutaneous Daily       Continuous Infusions:      Data:   Temperature:  Current - Temp: 98.4 °F (36.9 °C);  Max - Temp  Av.4 °F (36.9 °C)  Min: 98.3 °F (36.8 °C)  Max: 98.4 °F (36.9 °C)    Respiratory Rate : Resp  Av  Min: 16  Max: 16    Pulse Range: Pulse  Av.5  Min: 87  Max: 98    Blood Presuure Range:  Systolic (64YVY), OT , Min:135 , FFU:038   ; Diastolic (65VKR), RDM:74, Min:65, Max:71      Pulse ox Range: SpO2  Av.5 %  Min: 94 %  Max: 95 %    Patient Vitals for the past 8 hrs:   BP Temp Temp src Pulse Resp SpO2   19 0806 136/71 98.4 °F (36.9 °C) Oral 87 16 95 %         Intake/Output Summary (Last 24 hours) at 2019 1404  Last data filed at 7/7/2019 1342  Gross per 24 hour   Intake 460 ml   Output --   Net 460 ml       I/O last 3 completed shifts: In: 240.7 [P.O.:240.7]  Out: 300 [Urine:300]    I/O this shift:   In: 26 [P.O.:460]  Out: -     Wt Readings from Last 3 Encounters:   07/07/19 (!) 382 lb (173.3 kg)   02/26/19 (!) 382 lb (173.3 kg)   04/02/16 (!) 400 lb (181.4 kg)       Labs:   CBC:   Lab Results   Component Value Date    WBC 7.0 07/07/2019    RBC 3.64 07/07/2019    HGB 9.5 07/07/2019    HCT 30.8 07/07/2019    MCV 84.6 07/07/2019    MCH 26.1 07/07/2019    MCHC 30.8 07/07/2019    RDW 15.4 07/07/2019     07/07/2019    MPV 9.5 07/07/2019     CBC with Differential:    Lab Results   Component Value Date    WBC 7.0 07/07/2019    RBC 3.64 07/07/2019    HGB 9.5 07/07/2019    HCT 30.8 07/07/2019     07/07/2019    MCV 84.6 07/07/2019    MCH 26.1 07/07/2019    MCHC 30.8 07/07/2019    RDW 15.4 07/07/2019    SEGSPCT 71 06/26/2012    LYMPHOPCT 26.7 07/07/2019    MONOPCT 9.8 07/07/2019    BASOPCT 0.6 07/07/2019    MONOSABS 0.68 07/07/2019    LYMPHSABS 1.86 07/07/2019    EOSABS 0.13 07/07/2019    BASOSABS 0.04 07/07/2019     Hemoglobin/Hematocrit:    Lab Results   Component Value Date    HGB 9.5 07/07/2019    HCT 30.8 07/07/2019     CMP:    Lab Results   Component Value Date     07/07/2019    K 3.8 07/07/2019    K 3.7 07/06/2019     07/07/2019    CO2 25 07/07/2019    BUN 13 07/07/2019    CREATININE 0.8 07/07/2019    GFRAA >60 07/07/2019    LABGLOM >60 07/07/2019    GLUCOSE 113 07/07/2019    PROT 6.6 07/07/2019    LABALBU 3.6 07/07/2019    CALCIUM 9.4 07/07/2019    BILITOT 0.4 07/07/2019    ALKPHOS 69 07/07/2019    AST 19 07/07/2019    ALT 15 07/07/2019     BMP:    Lab Results   Component Value Date     07/07/2019    K 3.8 07/07/2019    K 3.7 07/06/2019     07/07/2019    CO2 25 07/07/2019    BUN 13 07/07/2019    LABALBU 3.6 07/07/2019    CREATININE 0.8 07/07/2019    CALCIUM 9.4 07/07/2019    GFRAA >60 07/07/2019 07/07/19  0333   TSH 2.280       GLUCOSE:No results for input(s): POCGLU in the last 72 hours. CMP:  Recent Labs     07/06/19  0102 07/07/19  0333    144   K 3.7 3.8    107   CO2 24 25   BUN 11 13   CREATININE 0.8 0.8   GFRAA >60 >60   LABGLOM >60 >60   GLUCOSE 123* 113*   PROT 7.3 6.6   LABALBU 4.0 3.6   CALCIUM 9.6 9.4   BILITOT 0.5 0.4   ALKPHOS 76 69   AST 18 19   ALT 14 15        BNP:No results found for: BNP    PROTIME/INR:No results for input(s): PROTIME, INR in the last 72 hours. CRP: No results for input(s): CRP in the last 72 hours. ESR:No results for input(s): SEDRATE in the last 72 hours. LIPASE , AMYLASE:  No results found for: LIPASE   No results found for: AMYLASE    ABGs: No results found for: PH, PO2, PCO2    CARDIAC: No results for input(s): CKTOTAL, CKMB, CKMBINDEX, TROPONINI in the last 72 hours. Lipid Profile:   Lab Results   Component Value Date    TRIG 140 07/07/2019    HDL 43 07/07/2019    LDLCALC 117 07/07/2019    CHOL 188 07/07/2019        Lab Results   Component Value Date    LABA1C 5.5 07/06/2019            RADIOLOGY: REVIEWED AVAILABLE REPORT  XR ANKLE RIGHT (MIN 3 VIEWS)   Final Result   Extensive postoperative changes with  surgical fusion of the ankle   with significant irregularity of the tibiotalar joint with the bony   fragments and significant soft tissue swelling concerning for   cellulitis/osteomyelitis.                       [address]  DO   5:00 PM     7/7/2019      Voice recognition software used for dictation no ROM deficits were identified

## 2021-04-15 NOTE — CONSULT NOTE ADULT - ASSESSMENT
79 y/o M with PMH of Alzheimer's disease, HTN, arthritis, depression  was brought by granddaughter due to chest pain and elevated BP.  1.Tele monitoring.  2.Echocardiogram.  3.HTN-inc lopressor 25mg  bid,cont norvasc.  4.Cont asa,statin.  5.Prediabetes-diet.  6.Gi and DVT prophylaxis.

## 2021-04-15 NOTE — PROGRESS NOTE ADULT - SUBJECTIVE AND OBJECTIVE BOX
PGY-1 Progress Note discussed with attending    PAGER #: [[phone]] TILL 5:00 PM  PLEASE CONTACT ON CALL TEAM:  - On Call Team (Please refer to Timo) FROM 5:00 PM - 8:30PM  - Nightfloat Team FROM 8:30 -7:30 AM    INTERVAL HPI/OVERNIGHT EVENTS:   No adverse events overnight. This morning, pt's chest pain was getting better. His troponins were negative x3. f/u Echo and stress test. Cardiology Dr. Radha casas. PT recommended home PT.    GOC: Pt is DNR/DNI    REVIEW OF SYSTEMS:  CONSTITUTIONAL: No fever, weight loss, or fatigue  RESPIRATORY: No cough, wheezing, chills or hemoptysis; No shortness of breath  CARDIOVASCULAR: No chest pain, palpitations, dizziness, or leg swelling  GASTROINTESTINAL: No abdominal pain. No nausea, vomiting, or hematemesis; No diarrhea or constipation. No melena or hematochezia.  GENITOURINARY: No dysuria or hematuria, urinary frequency  NEUROLOGICAL: No headaches, memory loss, loss of strength, numbness, or tremors  SKIN: No itching, burning, rashes, or lesions     Vital Signs Last 24 Hrs  T(C): 36.5 (15 Apr 2021 11:00), Max: 36.9 (15 Apr 2021 05:30)  T(F): 97.7 (15 Apr 2021 11:00), Max: 98.5 (15 Apr 2021 05:30)  HR: 76 (15 Apr 2021 11:00) (63 - 91)  BP: 153/71 (15 Apr 2021 11:00) (123/74 - 170/84)  BP(mean): 113 (14 Apr 2021 20:00) (111 - 113)  RR: 18 (15 Apr 2021 11:00) (17 - 18)  SpO2: 98% (15 Apr 2021 11:00) (97% - 100%)    PHYSICAL EXAMINATION:  GENERAL: NAD, well built  HEAD:  Atraumatic, Normocephalic  EYES:  conjunctiva and sclera clear  NECK: Supple, No JVD, Normal thyroid  CHEST/LUNG: Clear to auscultation. Clear to percussion bilaterally; No rales, rhonchi, wheezing, or rubs  HEART: Regular rate and rhythm; No murmurs, rubs, or gallops  ABDOMEN: Soft, Nontender, Nondistended; Bowel sounds present  NERVOUS SYSTEM:  Alert & Oriented X3,    EXTREMITIES:  2+ Peripheral Pulses, No clubbing, cyanosis, or edema  SKIN: warm dry                          14.5   10.03 )-----------( 153      ( 14 Apr 2021 05:49 )             42.7     04-14    137  |  105  |  10  ----------------------------<  163<H>  3.6   |  25  |  0.83    Ca    8.6      14 Apr 2021 05:49  Phos  1.7     04-14  Mg     2.3     04-14    TPro  7.7  /  Alb  3.5  /  TBili  1.0  /  DBili  x   /  AST  20  /  ALT  47  /  AlkPhos  64  04-14    LIVER FUNCTIONS - ( 14 Apr 2021 05:49 )  Alb: 3.5 g/dL / Pro: 7.7 g/dL / ALK PHOS: 64 U/L / ALT: 47 U/L DA / AST: 20 U/L / GGT: x           CARDIAC MARKERS ( 14 Apr 2021 05:49 )  0.017 ng/mL / x     / x     / x     / x      CARDIAC MARKERS ( 13 Apr 2021 23:54 )  0.037 ng/mL / x     / x     / x     / x      CARDIAC MARKERS ( 13 Apr 2021 17:33 )  <0.015 ng/mL / x     / x     / x     / x        CAPILLARY BLOOD GLUCOSE      RADIOLOGY & ADDITIONAL TESTS:

## 2021-04-16 ENCOUNTER — TRANSCRIPTION ENCOUNTER (OUTPATIENT)
Age: 81
End: 2021-04-16

## 2021-04-16 DIAGNOSIS — I35.0 NONRHEUMATIC AORTIC (VALVE) STENOSIS: ICD-10-CM

## 2021-04-16 LAB
ALBUMIN SERPL ELPH-MCNC: 3.3 G/DL — LOW (ref 3.5–5)
ALP SERPL-CCNC: 68 U/L — SIGNIFICANT CHANGE UP (ref 40–120)
ALT FLD-CCNC: 42 U/L DA — SIGNIFICANT CHANGE UP (ref 10–60)
ANION GAP SERPL CALC-SCNC: 8 MMOL/L — SIGNIFICANT CHANGE UP (ref 5–17)
AST SERPL-CCNC: 18 U/L — SIGNIFICANT CHANGE UP (ref 10–40)
BILIRUB SERPL-MCNC: 1.5 MG/DL — HIGH (ref 0.2–1.2)
BUN SERPL-MCNC: 15 MG/DL — SIGNIFICANT CHANGE UP (ref 7–18)
CALCIUM SERPL-MCNC: 8.9 MG/DL — SIGNIFICANT CHANGE UP (ref 8.4–10.5)
CHLORIDE SERPL-SCNC: 110 MMOL/L — HIGH (ref 96–108)
CO2 SERPL-SCNC: 23 MMOL/L — SIGNIFICANT CHANGE UP (ref 22–31)
CREAT SERPL-MCNC: 0.59 MG/DL — SIGNIFICANT CHANGE UP (ref 0.5–1.3)
GLUCOSE SERPL-MCNC: 120 MG/DL — HIGH (ref 70–99)
HCT VFR BLD CALC: 44.7 % — SIGNIFICANT CHANGE UP (ref 39–50)
HGB BLD-MCNC: 15.2 G/DL — SIGNIFICANT CHANGE UP (ref 13–17)
MAGNESIUM SERPL-MCNC: 2.6 MG/DL — SIGNIFICANT CHANGE UP (ref 1.6–2.6)
MCHC RBC-ENTMCNC: 31.9 PG — SIGNIFICANT CHANGE UP (ref 27–34)
MCHC RBC-ENTMCNC: 34 GM/DL — SIGNIFICANT CHANGE UP (ref 32–36)
MCV RBC AUTO: 93.7 FL — SIGNIFICANT CHANGE UP (ref 80–100)
NRBC # BLD: 0 /100 WBCS — SIGNIFICANT CHANGE UP (ref 0–0)
PHOSPHATE SERPL-MCNC: 2.6 MG/DL — SIGNIFICANT CHANGE UP (ref 2.5–4.5)
PLATELET # BLD AUTO: 182 K/UL — SIGNIFICANT CHANGE UP (ref 150–400)
POTASSIUM SERPL-MCNC: 3.7 MMOL/L — SIGNIFICANT CHANGE UP (ref 3.5–5.3)
POTASSIUM SERPL-SCNC: 3.7 MMOL/L — SIGNIFICANT CHANGE UP (ref 3.5–5.3)
PROT SERPL-MCNC: 7.9 G/DL — SIGNIFICANT CHANGE UP (ref 6–8.3)
RBC # BLD: 4.77 M/UL — SIGNIFICANT CHANGE UP (ref 4.2–5.8)
RBC # FLD: 13 % — SIGNIFICANT CHANGE UP (ref 10.3–14.5)
SODIUM SERPL-SCNC: 141 MMOL/L — SIGNIFICANT CHANGE UP (ref 135–145)
WBC # BLD: 9.64 K/UL — SIGNIFICANT CHANGE UP (ref 3.8–10.5)
WBC # FLD AUTO: 9.64 K/UL — SIGNIFICANT CHANGE UP (ref 3.8–10.5)

## 2021-04-16 RX ADMIN — AMLODIPINE BESYLATE 5 MILLIGRAM(S): 2.5 TABLET ORAL at 06:43

## 2021-04-16 RX ADMIN — Medication 81 MILLIGRAM(S): at 12:26

## 2021-04-16 RX ADMIN — ATORVASTATIN CALCIUM 40 MILLIGRAM(S): 80 TABLET, FILM COATED ORAL at 22:16

## 2021-04-16 RX ADMIN — Medication 25 MILLIGRAM(S): at 17:32

## 2021-04-16 RX ADMIN — ENOXAPARIN SODIUM 40 MILLIGRAM(S): 100 INJECTION SUBCUTANEOUS at 12:26

## 2021-04-16 RX ADMIN — Medication 25 MILLIGRAM(S): at 06:43

## 2021-04-16 RX ADMIN — PANTOPRAZOLE SODIUM 40 MILLIGRAM(S): 20 TABLET, DELAYED RELEASE ORAL at 06:43

## 2021-04-16 NOTE — PROGRESS NOTE ADULT - SUBJECTIVE AND OBJECTIVE BOX
CHIEF COMPLAINT:Patient is a 80y old  Male who presents with a chief complaint of Elevated BP and chest pain .Pt appears comfortable.    	  REVIEW OF SYSTEMS:  CONSTITUTIONAL: No fever, weight loss, or fatigue  EYES: No eye pain, visual disturbances, or discharge  ENT:  No difficulty hearing, tinnitus, vertigo; No sinus or throat pain  NECK: No pain or stiffness  RESPIRATORY: No cough, wheezing, chills or hemoptysis; No Shortness of Breath  CARDIOVASCULAR: No chest pain, palpitations, passing out, dizziness, or leg swelling  GASTROINTESTINAL: No abdominal or epigastric pain. No nausea, vomiting, or hematemesis; No diarrhea or constipation. No melena or hematochezia.  GENITOURINARY: No dysuria, frequency, hematuria, or incontinence  NEUROLOGICAL: No headaches, memory loss, loss of strength, numbness, or tremors  SKIN: No itching, burning, rashes, or lesions   LYMPH Nodes: No enlarged glands  ENDOCRINE: No heat or cold intolerance; No hair loss  MUSCULOSKELETAL: No joint pain or swelling; No muscle, back, or extremity pain  PSYCHIATRIC: No depression, anxiety, mood swings, or difficulty sleeping  HEME/LYMPH: No easy bruising, or bleeding gums  ALLERGY AND IMMUNOLOGIC: No hives or eczema	        PHYSICAL EXAM:  T(C): 36.8 (04-16-21 @ 10:02), Max: 36.8 (04-16-21 @ 10:02)  HR: 71 (04-16-21 @ 10:02) (61 - 79)  BP: 147/70 (04-16-21 @ 10:02) (143/64 - 161/69)  RR: 17 (04-16-21 @ 10:02) (16 - 18)  SpO2: 96% (04-16-21 @ 10:02) (95% - 98%)  Wt(kg): --  I&O's Summary      Appearance: Normal	  HEENT:   Normal oral mucosa, PERRL, EOMI	  Lymphatic: No lymphadenopathy  Cardiovascular: Normal S1 S2, No JVD, No murmurs, No edema  Respiratory: Lungs clear to auscultation	  Psychiatry: A & O x 3, Mood & affect appropriate  Gastrointestinal:  Soft, Non-tender, + BS	  Skin: No rashes, No ecchymoses, No cyanosis	  Neurologic: Non-focal  Extremities: Normal range of motion, No clubbing, cyanosis or edema  Vascular: Peripheral pulses palpable 2+ bilaterally    MEDICATIONS  (STANDING):  amLODIPine   Tablet 5 milliGRAM(s) Oral daily  aspirin  chewable 81 milliGRAM(s) Oral daily  atorvastatin 40 milliGRAM(s) Oral at bedtime  enoxaparin Injectable 40 milliGRAM(s) SubCutaneous daily  metoprolol tartrate 25 milliGRAM(s) Oral two times a day  pantoprazole    Tablet 40 milliGRAM(s) Oral before breakfast        	  LABS:	 	                   15.2   9.64  )-----------( 182      ( 16 Apr 2021 06:40 )             44.7     04-16    141  |  110<H>  |  15  ----------------------------<  120<H>  3.7   |  23  |  0.59    Ca    8.9      16 Apr 2021 06:40  Phos  2.6     04-16  Mg     2.6     04-16    TPro  7.9  /  Alb  3.3<L>  /  TBili  1.5<H>  /  DBili  x   /  AST  18  /  ALT  42  /  AlkPhos  68  04-16      Lipid Profile: Cholesterol 157  HDL 49        TSH: Thyroid Stimulating Hormone, Serum: 3.62 uU/mL (04-14 @ 05:49)      	      < from: Transthoracic Echocardiogram (04.15.21 @ 07:32) >  OBSERVATIONS:  Mitral Valve: Mild posterior mitral annular calcification.  Trace mitral regurgitation.  Aortic Root: Normal aortic root.  Aortic Valve: Calcified trileaflet aortic valve with  decreased opening. Peak transaortic valve gradient equals  74 mm Hg, mean transaortic valve gradient equals 42 mm Hg,  dimensionless index 0.23, estimated aortic valve area  equals 0.7 sqcm (by continuity equation), consistent with  severeaortic stenosis. Mild aortic regurgitation (  msec).  Peak left ventricular outflow tract gradient equals  24.4 mm Hg, consistent with mild LVOT obstruction. This  does not impede calculation of aortic valve area.  Left Atrium: Normal left atrium.  LA volume index = 27  cc/m2.  Left Ventricle: Normal Left Ventricular Systolic Function,  (EF = 62% by biplane) No regional wall motion  abnormalities. Moderate concentric left ventricular  hypertrophy. Grade I diastolic dysfunction (Impaired  relaxation).  Right Heart: Normal right atrium. Normal right ventricular  size and systolic function (TAPSE 1.8 cm). Normal tricuspid  valve. Trace tricuspid regurgitation. Normal pulmonic  valve. Trace pulmonic insufficiency is noted.  Pericardium/PleuraNo pericardial effusion. A prominent  epicardial fat pad is noted.  Hemodynamic: Insufficient tricuspid regurgitation jet to  allow calculation of RVSP.

## 2021-04-16 NOTE — DISCHARGE NOTE PROVIDER - NSDCCPCAREPLAN_GEN_ALL_CORE_FT
PRINCIPAL DISCHARGE DIAGNOSIS  Diagnosis: Severe aortic stenosis  Assessment and Plan of Treatment: You came to the hospital with chest pain. Your Electrocardiogram (EKG) showed normal sinus rhythm, no abnormalities on telemetry, troponin levels were negative, Echocardiogram showed normal ejection fraction, grade 1 diastolic dysfunction, severe aortic stenosis (aortic valve annulus 0.7). Cardiology Dr. Garcia followed up.      SECONDARY DISCHARGE DIAGNOSES  Diagnosis: Hypertension  Assessment and Plan of Treatment: You had high blood pressures on admission. You were started on amlodipine and lopressor medications. You need to continue to take these medications on discharge.    Diagnosis: Alzheimer's dementia  Assessment and Plan of Treatment: You were diagnosed with Alzheimer's dementia, not treated with any medication. You had some delirious events for which you were given haldol medications.     PRINCIPAL DISCHARGE DIAGNOSIS  Diagnosis: Severe aortic stenosis  Assessment and Plan of Treatment: You came to the hospital with chest pain. Your Electrocardiogram (EKG) showed normal sinus rhythm, no abnormalities on telemetry, troponin levels were negative, Echocardiogram showed normal ejection fraction, grade 1 diastolic dysfunction, severe aortic stenosis (aortic valve annulus 0.7). Cardiology Dr. Garcia followed up. You will be transferred to [facility]      SECONDARY DISCHARGE DIAGNOSES  Diagnosis: Hypertension  Assessment and Plan of Treatment: You had high blood pressures on admission. You were started on amlodipine and lopressor medications. You need to continue to take these medications on discharge.    Diagnosis: Alzheimer's dementia  Assessment and Plan of Treatment: You were diagnosed with Alzheimer's dementia, not treated with any medication. You had some delirious events for which you were given haldol medications.

## 2021-04-16 NOTE — DISCHARGE NOTE PROVIDER - HOSPITAL COURSE
Patient is a 79 y/o Male from home with PMH of Alzheimer's disease, HTN, arthritis, depression  was brought by granddaughter due to chest pain and elevated BP. Patient admitted to telemetry for chest pain r/o ACS.  For Chest pain possible ischemic demand, EKG NSR, Troponins negative x3, no adverse events on Tele monitoring, treated with aspirin, statin and lopressor, Echocardiogram shows Ileana, grade 1DDF, severe AS (ORLY 0.7). Cardiology Dr. Radha casas.  For HTN (hypertension).  Plan: Pt is not on BP medications for many years, started on amlodipine 5 mg and lopressor 25 bid.   For Alzheimer's disease,  Patient not on medication, sundowning-delirium-attempting to get OOB without assistance, was given Haldol 2mg IVP x 1, was on enhanced supervision.   Was on DVT ppx: Lovenox and GI PPX: PPI.    For Goals of care, pt and family wanted DNR/DNI, MOLST in chart.    Patient is a 79 y/o Male from home with PMH of Alzheimer's disease, HTN, arthritis, depression  was brought by granddaughter due to chest pain and elevated BP. Patient admitted to telemetry for chest pain r/o ACS.  For Chest pain possible ischemic demand, EKG NSR, Troponins negative x3, no adverse events on Tele monitoring, treated with aspirin, statin and lopressor, Echocardiogram shows Ileana, grade 1DDF, severe AS (ORLY 0.7). Cardiology Dr. Radha casas.   For HTN (hypertension).  Plan: Pt is not on BP medications for many years, started on amlodipine 5 mg and lopressor 25 bid.   For Alzheimer's disease,  Patient not on medication, sundowning-delirium-attempting to get OOB without assistance, was given Haldol 2mg IVP x 1, was on enhanced supervision.   Was on DVT ppx: Lovenox and GI PPX: PPI.    For Goals of care, pt and family wanted DNR/DNI, MOLST in chart.     Pt will be transferred to [facility] for Cardiac Cath and TAVR after.

## 2021-04-16 NOTE — PROGRESS NOTE ADULT - ASSESSMENT
81 y/o M with PMH of Alzheimer's disease, HTN, arthritis, depression  was brought by granddaughter due to chest pain and elevated BP.  1.Tele monitoring.  2.Echocardiogram shows sevre AS,spoke to grandaighter regarding TAVR eval and cath,await their decision..  3.HTN- lopressor 25mg  bid, norvasc.  4.Cont asa,statin.  5.Prediabetes-diet.  6.Gi and DVT prophylaxis.

## 2021-04-16 NOTE — DISCHARGE NOTE PROVIDER - NSDCMRMEDTOKEN_GEN_ALL_CORE_FT
amLODIPine 5 mg oral tablet: 1 tab(s) orally once a day  aspirin 81 mg oral tablet, chewable: 1 tab(s) orally once a day  atorvastatin 40 mg oral tablet: 1 tab(s) orally once a day (at bedtime)  metoprolol tartrate 25 mg oral tablet: 1 tab(s) orally 2 times a day  pantoprazole 40 mg oral delayed release tablet: 1 tab(s) orally once a day (before a meal)  zinc oxide 20% topical ointment: 1 application topically once a day

## 2021-04-16 NOTE — PROGRESS NOTE ADULT - PROBLEM SELECTOR PLAN 1
- possible ischemic demand  - EKG NSR  - Troponin negative  - c/w Tele monitoring  - c/w aspirin, statin and lopressor  - Echocardiogram showed Ileana, grade 1DDF, severe AS (ORLY 0.7)  - Cardio Dr. Garcia - EKG NSR  - Troponin negative  - c/w Tele monitoring  - c/w aspirin, statin and lopressor  - Echocardiogram showed Ileana, grade 1DDF, severe AS (ORLY 0.7)  - Cardiology Dr. Garcia

## 2021-04-16 NOTE — PROGRESS NOTE ADULT - SUBJECTIVE AND OBJECTIVE BOX
PGY-1 Progress Note discussed with attending    PAGER #: [[phone]] TILL 5:00 PM  PLEASE CONTACT ON CALL TEAM:  - On Call Team (Please refer to Timo) FROM 5:00 PM - 8:30PM  - Nightfloat Team FROM 8:30 -7:30 AM    INTERVAL HPI/OVERNIGHT EVENTS:   No adverse events overnight. Echocardiogram showed Ileana, grade 1DDF, severe AS (ORLY 0.7). Cardiology Dr. Garcia spoke to family about cardiac catheterization, TAVR. Awaiting family decisions on further management.    REVIEW OF SYSTEMS:  CONSTITUTIONAL: No fever, weight loss, or fatigue  RESPIRATORY: No cough, wheezing, chills or hemoptysis; No shortness of breath  CARDIOVASCULAR: No chest pain, palpitations, dizziness, or leg swelling  GASTROINTESTINAL: No abdominal pain. No nausea, vomiting, or hematemesis; No diarrhea or constipation. No melena or hematochezia.  GENITOURINARY: No dysuria or hematuria, urinary frequency  NEUROLOGICAL: No headaches, memory loss, loss of strength, numbness, or tremors  SKIN: No itching, burning, rashes, or lesions     Vital Signs Last 24 Hrs  T(C): 36.8 (16 Apr 2021 10:02), Max: 36.8 (16 Apr 2021 10:02)  T(F): 98.3 (16 Apr 2021 10:02), Max: 98.3 (16 Apr 2021 10:02)  HR: 71 (16 Apr 2021 10:02) (61 - 79)  BP: 147/70 (16 Apr 2021 10:02) (143/64 - 161/69)  RR: 17 (16 Apr 2021 10:02) (16 - 18)  SpO2: 96% (16 Apr 2021 10:02) (95% - 98%)    PHYSICAL EXAMINATION:  GENERAL: NAD, well built  HEAD:  Atraumatic, Normocephalic  EYES:  conjunctiva and sclera clear  NECK: Supple, No JVD, Normal thyroid  CHEST/LUNG: Clear to auscultation. Clear to percussion bilaterally; No rales, rhonchi, wheezing, or rubs  HEART: Regular rate and rhythm; No murmurs, rubs, or gallops  ABDOMEN: Soft, Nontender, Nondistended; Bowel sounds present  NERVOUS SYSTEM:  Alert & Oriented X3,    EXTREMITIES:  2+ Peripheral Pulses, No clubbing, cyanosis, or edema  SKIN: warm dry                          15.2   9.64  )-----------( 182      ( 16 Apr 2021 06:40 )             44.7     04-16    141  |  110<H>  |  15  ----------------------------<  120<H>  3.7   |  23  |  0.59    Ca    8.9      16 Apr 2021 06:40  Phos  2.6     04-16  Mg     2.6     04-16    TPro  7.9  /  Alb  3.3<L>  /  TBili  1.5<H>  /  DBili  x   /  AST  18  /  ALT  42  /  AlkPhos  68  04-16    LIVER FUNCTIONS - ( 16 Apr 2021 06:40 )  Alb: 3.3 g/dL / Pro: 7.9 g/dL / ALK PHOS: 68 U/L / ALT: 42 U/L DA / AST: 18 U/L / GGT: x             CAPILLARY BLOOD GLUCOSE      RADIOLOGY & ADDITIONAL TESTS:

## 2021-04-17 RX ORDER — ZINC OXIDE 200 MG/G
1 OINTMENT TOPICAL DAILY
Refills: 0 | Status: DISCONTINUED | OUTPATIENT
Start: 2021-04-17 | End: 2021-04-19

## 2021-04-17 RX ADMIN — Medication 81 MILLIGRAM(S): at 12:03

## 2021-04-17 RX ADMIN — ENOXAPARIN SODIUM 40 MILLIGRAM(S): 100 INJECTION SUBCUTANEOUS at 12:03

## 2021-04-17 RX ADMIN — ATORVASTATIN CALCIUM 40 MILLIGRAM(S): 80 TABLET, FILM COATED ORAL at 22:02

## 2021-04-17 RX ADMIN — ZINC OXIDE 1 APPLICATION(S): 200 OINTMENT TOPICAL at 12:03

## 2021-04-17 RX ADMIN — Medication 25 MILLIGRAM(S): at 05:11

## 2021-04-17 RX ADMIN — Medication 25 MILLIGRAM(S): at 18:21

## 2021-04-17 RX ADMIN — PANTOPRAZOLE SODIUM 40 MILLIGRAM(S): 20 TABLET, DELAYED RELEASE ORAL at 05:11

## 2021-04-17 RX ADMIN — AMLODIPINE BESYLATE 5 MILLIGRAM(S): 2.5 TABLET ORAL at 05:11

## 2021-04-17 NOTE — PROGRESS NOTE ADULT - ASSESSMENT
81 y/o M with PMH of Alzheimer's disease, HTN, arthritis, depression  was brought by granddaughter due to chest pain and elevated BP.  1.D/C Tele monitoring.  2.Echocardiogram shows sevre AS,family agree for  TAVR eval and cath.  3.HTN- lopressor 25mg  bid, norvasc.  4.Cont asa,statin.  5.Prediabetes-diet.  6.Gi and DVT prophylaxis.

## 2021-04-17 NOTE — PROGRESS NOTE ADULT - SUBJECTIVE AND OBJECTIVE BOX
INTERVAL HPI/OVERNIGHT EVENTS:  Patient seen,evnts noticed,no dystress  VITAL SIGNS:  T(F): 97.9 (04-17-21 @ 04:56)  HR: 70 (04-17-21 @ 04:56)  BP: 155/78 (04-17-21 @ 04:56)  RR: 17 (04-17-21 @ 04:56)  SpO2: 99% (04-17-21 @ 04:56)  Wt(kg): --    PHYSICAL EXAM:  awake,alert  Constitutional:  Eyes:  ENMT:perrla  Neck:  Respiratory:clear  Cardiovascular:s1s2,m-none  Gastrointestinal:soft,bs pos  Extremities:  Vascular:  Neurological:no focal deficit  Musculoskeletal:    MEDICATIONS  (STANDING):  amLODIPine   Tablet 5 milliGRAM(s) Oral daily  aspirin  chewable 81 milliGRAM(s) Oral daily  atorvastatin 40 milliGRAM(s) Oral at bedtime  enoxaparin Injectable 40 milliGRAM(s) SubCutaneous daily  metoprolol tartrate 25 milliGRAM(s) Oral two times a day  pantoprazole    Tablet 40 milliGRAM(s) Oral before breakfast  zinc oxide 20% Ointment 1 Application(s) Topical daily    MEDICATIONS  (PRN):      Allergies    No Known Allergies    Intolerances        LABS:                        15.2   9.64  )-----------( 182      ( 16 Apr 2021 06:40 )             44.7     04-16    141  |  110<H>  |  15  ----------------------------<  120<H>  3.7   |  23  |  0.59    Ca    8.9      16 Apr 2021 06:40  Phos  2.6     04-16  Mg     2.6     04-16    TPro  7.9  /  Alb  3.3<L>  /  TBili  1.5<H>  /  DBili  x   /  AST  18  /  ALT  42  /  AlkPhos  68  04-16          RADIOLOGY & ADDITIONAL TESTS:      · Assessment	  Patient is a 81 y/o Male from home with PMH of Alzheimer's disease, HTN, arthritis, depression  was brought by granddaughter due to chest pain and elevated BP. Patient admitted to telemetry for chest pain r/o ACS.           Problem/Plan - 1:  ·  Problem: Severe aortic stenosis.   - c/w Tele monitoring  - c/w aspirin, statin and lopressor  - Echocardiogram showed Ileana, grade 1DDF, severe AS (ORLY 0.7)  - Cardiology Dr. Garcia.-family to decide for possible cardiac cath- and avr     Problem/Plan - 2:  ·  Problem: HTN (hypertension).  Plan: Pt is not on BP medications for many years  - c/w amlodipine 5 mg od and lopressor 25 bid.      Problem/Plan - 3:  ·  Problem: Alzheimer's disease.  Plan: - Patient not on medication  - sundowning-delirium-attempting to get OOB without assistance  - s/p Haldol 2mg IVP x 1  - enhanced supervision.      Problem/Plan - 4:  ·  Problem: Need for prophylactic measure.  Plan: - DVT PPX: Lovenox  - GI PPX: PPI.      Problem/Plan - 5:  ·  Problem: Goals of care, counseling/discussion.  Plan: DNR/DNI  MOLST in chart.

## 2021-04-17 NOTE — PROGRESS NOTE ADULT - SUBJECTIVE AND OBJECTIVE BOX
CHIEF COMPLAINT:Patient is a 80y old  Male who presents with a chief complaint of Elevated BP and chest pain.Pt appears comfortable.    	  REVIEW OF SYSTEMS:  CONSTITUTIONAL: No fever, weight loss, or fatigue  EYES: No eye pain, visual disturbances, or discharge  ENT:  No difficulty hearing, tinnitus, vertigo; No sinus or throat pain  NECK: No pain or stiffness  RESPIRATORY: No cough, wheezing, chills or hemoptysis; No Shortness of Breath  CARDIOVASCULAR: No chest pain, palpitations, passing out, dizziness, or leg swelling  GASTROINTESTINAL: No abdominal or epigastric pain. No nausea, vomiting, or hematemesis; No diarrhea or constipation. No melena or hematochezia.  GENITOURINARY: No dysuria, frequency, hematuria, or incontinence  NEUROLOGICAL: No headaches, memory loss, loss of strength, numbness, or tremors  SKIN: No itching, burning, rashes, or lesions   LYMPH Nodes: No enlarged glands  ENDOCRINE: No heat or cold intolerance; No hair loss  MUSCULOSKELETAL: No joint pain or swelling; No muscle, back, or extremity pain  PSYCHIATRIC: No depression, anxiety, mood swings, or difficulty sleeping  HEME/LYMPH: No easy bruising, or bleeding gums  ALLERGY AND IMMUNOLOGIC: No hives or eczema	      PHYSICAL EXAM:  T(C): 36.6 (04-17-21 @ 04:56), Max: 37.2 (04-16-21 @ 13:28)  HR: 70 (04-17-21 @ 04:56) (68 - 72)  BP: 155/78 (04-17-21 @ 04:56) (115/65 - 158/72)  RR: 17 (04-17-21 @ 04:56) (16 - 18)  SpO2: 99% (04-17-21 @ 04:56) (95% - 99%)  Wt(kg): --  I&O's Summary      Appearance: Normal	  HEENT:   Normal oral mucosa, PERRL, EOMI	  Lymphatic: No lymphadenopathy  Cardiovascular: Normal S1 S2, No JVD, No murmurs, No edema  Respiratory: Lungs clear to auscultation	  Psychiatry: A & O x 3, Mood & affect appropriate  Gastrointestinal:  Soft, Non-tender, + BS	  Skin: No rashes, No ecchymoses, No cyanosis	  Neurologic: Non-focal  Extremities: Normal range of motion, No clubbing, cyanosis or edema  Vascular: Peripheral pulses palpable 2+ bilaterally    MEDICATIONS  (STANDING):  amLODIPine   Tablet 5 milliGRAM(s) Oral daily  aspirin  chewable 81 milliGRAM(s) Oral daily  atorvastatin 40 milliGRAM(s) Oral at bedtime  enoxaparin Injectable 40 milliGRAM(s) SubCutaneous daily  metoprolol tartrate 25 milliGRAM(s) Oral two times a day  pantoprazole    Tablet 40 milliGRAM(s) Oral before breakfast  zinc oxide 20% Ointment 1 Application(s) Topical daily      	  	  LABS:	 	                        15.2   9.64  )-----------( 182      ( 16 Apr 2021 06:40 )             44.7     04-16    141  |  110<H>  |  15  ----------------------------<  120<H>  3.7   |  23  |  0.59    Ca    8.9      16 Apr 2021 06:40  Phos  2.6     04-16  Mg     2.6     04-16    TPro  7.9  /  Alb  3.3<L>  /  TBili  1.5<H>  /  DBili  x   /  AST  18  /  ALT  42  /  AlkPhos  68  04-16      Lipid Profile: Cholesterol 157  LDL --  HDL 49      HgA1c:   TSH: Thyroid Stimulating Hormone, Serum: 3.62 uU/mL (04-14 @ 05:49)

## 2021-04-18 RX ORDER — LANOLIN ALCOHOL/MO/W.PET/CERES
3 CREAM (GRAM) TOPICAL ONCE
Refills: 0 | Status: COMPLETED | OUTPATIENT
Start: 2021-04-18 | End: 2021-04-18

## 2021-04-18 RX ADMIN — ZINC OXIDE 1 APPLICATION(S): 200 OINTMENT TOPICAL at 12:23

## 2021-04-18 RX ADMIN — Medication 3 MILLIGRAM(S): at 01:07

## 2021-04-18 RX ADMIN — ENOXAPARIN SODIUM 40 MILLIGRAM(S): 100 INJECTION SUBCUTANEOUS at 12:23

## 2021-04-18 RX ADMIN — AMLODIPINE BESYLATE 5 MILLIGRAM(S): 2.5 TABLET ORAL at 06:55

## 2021-04-18 RX ADMIN — PANTOPRAZOLE SODIUM 40 MILLIGRAM(S): 20 TABLET, DELAYED RELEASE ORAL at 06:54

## 2021-04-18 RX ADMIN — Medication 81 MILLIGRAM(S): at 12:23

## 2021-04-18 RX ADMIN — Medication 25 MILLIGRAM(S): at 06:54

## 2021-04-18 RX ADMIN — Medication 25 MILLIGRAM(S): at 17:08

## 2021-04-18 RX ADMIN — ATORVASTATIN CALCIUM 40 MILLIGRAM(S): 80 TABLET, FILM COATED ORAL at 21:57

## 2021-04-18 NOTE — PROGRESS NOTE ADULT - SUBJECTIVE AND OBJECTIVE BOX
CHIEF COMPLAINT:Patient is a 80y old  Male who presents with a chief complaint of Elevated BP and chest pain .Pt appears comfortable.    	  REVIEW OF SYSTEMS:  CONSTITUTIONAL: No fever, weight loss, or fatigue  EYES: No eye pain, visual disturbances, or discharge  ENT:  No difficulty hearing, tinnitus, vertigo; No sinus or throat pain  NECK: No pain or stiffness  RESPIRATORY: No cough, wheezing, chills or hemoptysis; No Shortness of Breath  CARDIOVASCULAR: No chest pain, palpitations, passing out, dizziness, or leg swelling  GASTROINTESTINAL: No abdominal or epigastric pain. No nausea, vomiting, or hematemesis; No diarrhea or constipation. No melena or hematochezia.  GENITOURINARY: No dysuria, frequency, hematuria, or incontinence  NEUROLOGICAL: No headaches, memory loss, loss of strength, numbness, or tremors  SKIN: No itching, burning, rashes, or lesions   LYMPH Nodes: No enlarged glands  ENDOCRINE: No heat or cold intolerance; No hair loss  MUSCULOSKELETAL: No joint pain or swelling; No muscle, back, or extremity pain  PSYCHIATRIC: No depression, anxiety, mood swings, or difficulty sleeping  HEME/LYMPH: No easy bruising, or bleeding gums  ALLERGY AND IMMUNOLOGIC: No hives or eczema	      PHYSICAL EXAM:  T(C): 36.7 (04-18-21 @ 05:40), Max: 36.7 (04-17-21 @ 14:12)  HR: 69 (04-18-21 @ 05:40) (66 - 104)  BP: 148/82 (04-18-21 @ 05:40) (106/70 - 162/69)  RR: 18 (04-18-21 @ 05:40) (17 - 18)  SpO2: 97% (04-18-21 @ 05:40) (96% - 98%)  Wt(kg): --  I&O's Summary    17 Apr 2021 07:01  -  18 Apr 2021 07:00  --------------------------------------------------------  IN: 0 mL / OUT: 3 mL / NET: -3 mL        Appearance: Normal	  HEENT:   Normal oral mucosa, PERRL, EOMI	  Lymphatic: No lymphadenopathy  Cardiovascular: Normal S1 S2, No JVD, No murmurs, No edema  Respiratory: Lungs clear to auscultation	  Psychiatry: A & O x 3, Mood & affect appropriate  Gastrointestinal:  Soft, Non-tender, + BS	  Skin: No rashes, No ecchymoses, No cyanosis	  Neurologic: Non-focal  Extremities: Normal range of motion, No clubbing, cyanosis or edema  Vascular: Peripheral pulses palpable 2+ bilaterally    MEDICATIONS  (STANDING):  amLODIPine   Tablet 5 milliGRAM(s) Oral daily  aspirin  chewable 81 milliGRAM(s) Oral daily  atorvastatin 40 milliGRAM(s) Oral at bedtime  enoxaparin Injectable 40 milliGRAM(s) SubCutaneous daily  metoprolol tartrate 25 milliGRAM(s) Oral two times a day  pantoprazole    Tablet 40 milliGRAM(s) Oral before breakfast  zinc oxide 20% Ointment 1 Application(s) Topical daily    	  LABS:	 	       Lipid Profile: Cholesterol 157  LDL --  HDL 49        TSH: Thyroid Stimulating Hormone, Serum: 3.62 uU/mL (04-14 @ 05:49)

## 2021-04-18 NOTE — PROGRESS NOTE ADULT - ASSESSMENT
Patient is a 79 y/o Male from home with PMH of Alzheimer's disease, HTN, arthritis, depression  was brought by granddaughter due to chest pain and elevated BP. Patient admitted to for severe AS.

## 2021-04-18 NOTE — PROGRESS NOTE ADULT - PROBLEM SELECTOR PLAN 1
- EKG NSR  - Troponin negative  - c/w Tele monitoring  - c/w aspirin, statin and lopressor  - Echocardiogram showed Ileana, grade 1DDF, severe AS (ORLY 0.7)  - Will get Cardiac Cath and TAVR on Monday 4/19  - Cardiology Dr. Garcia

## 2021-04-18 NOTE — PROGRESS NOTE ADULT - PROBLEM SELECTOR PLAN 2
Pt is not on BP medications for many years  - c/w amlodipine 5 mg od, lopressor 25 bid  - c/w atorvastatin 40 od  - monitor BPs

## 2021-04-18 NOTE — PROGRESS NOTE ADULT - SUBJECTIVE AND OBJECTIVE BOX
PGY-1 Progress Note discussed with attending    PAGER #: [[phone]] TILL 5:00 PM  PLEASE CONTACT ON CALL TEAM:  - On Call Team (Please refer to Timo) FROM 5:00 PM - 8:30PM  - Nightfloat Team FROM 8:30 -7:30 AM    INTERVAL HPI/OVERNIGHT EVENTS:   No adverse events overnight. Echocardiogram showed Ileana, grade 1DDF, severe AS (ORLY 0.7). Cardiology Dr. Garcia spoke to family about cardiac catheterization, TAVR. Family agreed to go forward with the procedure. Will get transferred to [facility] on Monday 4/19.    REVIEW OF SYSTEMS:  CONSTITUTIONAL: No fever, weight loss, or fatigue  RESPIRATORY: No cough, wheezing, chills or hemoptysis; No shortness of breath  CARDIOVASCULAR: No chest pain, palpitations, dizziness, or leg swelling  GASTROINTESTINAL: No abdominal pain. No nausea, vomiting, or hematemesis; No diarrhea or constipation. No melena or hematochezia.  GENITOURINARY: No dysuria or hematuria, urinary frequency  NEUROLOGICAL: No headaches, memory loss, loss of strength, numbness, or tremors  SKIN: No itching, burning, rashes, or lesions     Vital Signs Last 24 Hrs  T(C): 36.7 (18 Apr 2021 05:40), Max: 36.7 (17 Apr 2021 14:12)  T(F): 98.1 (18 Apr 2021 05:40), Max: 98.1 (18 Apr 2021 05:40)  HR: 69 (18 Apr 2021 05:40) (66 - 104)  BP: 148/82 (18 Apr 2021 05:40) (106/70 - 162/69)  BP(mean): --  RR: 18 (18 Apr 2021 05:40) (17 - 18)  SpO2: 97% (18 Apr 2021 05:40) (96% - 98%)    PHYSICAL EXAMINATION:  GENERAL: MIld distress  HEAD:  Atraumatic, Normocephalic  EYES:  conjunctiva and sclera clear  NECK: Supple, No JVD, Normal thyroid  CHEST/LUNG: Clear to auscultation. Clear to percussion bilaterally; No rales, rhonchi, wheezing, or rubs  HEART: Regular rate and rhythm; No murmurs, rubs, or gallops  ABDOMEN: Soft, Nontender, Nondistended; Bowel sounds present  NERVOUS SYSTEM:  Alert & Oriented X1,    EXTREMITIES:  2+ Peripheral Pulses, No clubbing, cyanosis, or edema  SKIN: warm dry    CAPILLARY BLOOD GLUCOSE      RADIOLOGY & ADDITIONAL TESTS:

## 2021-04-18 NOTE — PROGRESS NOTE ADULT - ASSESSMENT
81 y/o M with PMH of Alzheimer's disease, HTN, arthritis, depression  was brought by granddaughter due to chest pain and elevated BP.  1.Echocardiogram shows sevre AS,family agree for  TAVR eval and cath.  2.HTN- lopressor 25mg  bid, norvasc.  3.Cont asa,statin.  4.Prediabetes-diet.  5.Gi and DVT prophylaxis.

## 2021-04-19 ENCOUNTER — INPATIENT (INPATIENT)
Facility: HOSPITAL | Age: 81
LOS: 2 days | Discharge: HOME | End: 2021-04-22
Attending: INTERNAL MEDICINE | Admitting: INTERNAL MEDICINE
Payer: MEDICARE

## 2021-04-19 VITALS
DIASTOLIC BLOOD PRESSURE: 66 MMHG | HEART RATE: 66 BPM | SYSTOLIC BLOOD PRESSURE: 158 MMHG | RESPIRATION RATE: 18 BRPM | OXYGEN SATURATION: 98 %

## 2021-04-19 VITALS
WEIGHT: 131.84 LBS | DIASTOLIC BLOOD PRESSURE: 75 MMHG | HEART RATE: 64 BPM | HEIGHT: 65 IN | TEMPERATURE: 98 F | OXYGEN SATURATION: 98 % | SYSTOLIC BLOOD PRESSURE: 143 MMHG | RESPIRATION RATE: 16 BRPM

## 2021-04-19 DIAGNOSIS — I35.0 NONRHEUMATIC AORTIC (VALVE) STENOSIS: ICD-10-CM

## 2021-04-19 DIAGNOSIS — R07.9 CHEST PAIN, UNSPECIFIED: ICD-10-CM

## 2021-04-19 DIAGNOSIS — I10 ESSENTIAL (PRIMARY) HYPERTENSION: ICD-10-CM

## 2021-04-19 DIAGNOSIS — G30.9 ALZHEIMER'S DISEASE, UNSPECIFIED: ICD-10-CM

## 2021-04-19 LAB
ALBUMIN SERPL ELPH-MCNC: 3 G/DL — LOW (ref 3.5–5)
ALP SERPL-CCNC: 67 U/L — SIGNIFICANT CHANGE UP (ref 40–120)
ALT FLD-CCNC: 38 U/L DA — SIGNIFICANT CHANGE UP (ref 10–60)
ANION GAP SERPL CALC-SCNC: 7 MMOL/L — SIGNIFICANT CHANGE UP (ref 5–17)
AST SERPL-CCNC: 16 U/L — SIGNIFICANT CHANGE UP (ref 10–40)
BILIRUB SERPL-MCNC: 0.6 MG/DL — SIGNIFICANT CHANGE UP (ref 0.2–1.2)
BUN SERPL-MCNC: 22 MG/DL — HIGH (ref 7–18)
CALCIUM SERPL-MCNC: 8.8 MG/DL — SIGNIFICANT CHANGE UP (ref 8.4–10.5)
CHLORIDE SERPL-SCNC: 107 MMOL/L — SIGNIFICANT CHANGE UP (ref 96–108)
CO2 SERPL-SCNC: 25 MMOL/L — SIGNIFICANT CHANGE UP (ref 22–31)
CREAT SERPL-MCNC: 0.79 MG/DL — SIGNIFICANT CHANGE UP (ref 0.5–1.3)
GLUCOSE SERPL-MCNC: 168 MG/DL — HIGH (ref 70–99)
HCT VFR BLD CALC: 40.9 % — SIGNIFICANT CHANGE UP (ref 39–50)
HGB BLD-MCNC: 13.7 G/DL — SIGNIFICANT CHANGE UP (ref 13–17)
MAGNESIUM SERPL-MCNC: 2.5 MG/DL — SIGNIFICANT CHANGE UP (ref 1.6–2.6)
MCHC RBC-ENTMCNC: 31.6 PG — SIGNIFICANT CHANGE UP (ref 27–34)
MCHC RBC-ENTMCNC: 33.5 GM/DL — SIGNIFICANT CHANGE UP (ref 32–36)
MCV RBC AUTO: 94.2 FL — SIGNIFICANT CHANGE UP (ref 80–100)
NRBC # BLD: 0 /100 WBCS — SIGNIFICANT CHANGE UP (ref 0–0)
PHOSPHATE SERPL-MCNC: 2.5 MG/DL — SIGNIFICANT CHANGE UP (ref 2.5–4.5)
PLATELET # BLD AUTO: 215 K/UL — SIGNIFICANT CHANGE UP (ref 150–400)
POTASSIUM SERPL-MCNC: 3.6 MMOL/L — SIGNIFICANT CHANGE UP (ref 3.5–5.3)
POTASSIUM SERPL-SCNC: 3.6 MMOL/L — SIGNIFICANT CHANGE UP (ref 3.5–5.3)
PROT SERPL-MCNC: 7.4 G/DL — SIGNIFICANT CHANGE UP (ref 6–8.3)
RBC # BLD: 4.34 M/UL — SIGNIFICANT CHANGE UP (ref 4.2–5.8)
RBC # FLD: 12.7 % — SIGNIFICANT CHANGE UP (ref 10.3–14.5)
SODIUM SERPL-SCNC: 139 MMOL/L — SIGNIFICANT CHANGE UP (ref 135–145)
WBC # BLD: 8.09 K/UL — SIGNIFICANT CHANGE UP (ref 3.8–10.5)
WBC # FLD AUTO: 8.09 K/UL — SIGNIFICANT CHANGE UP (ref 3.8–10.5)

## 2021-04-19 PROCEDURE — 83735 ASSAY OF MAGNESIUM: CPT

## 2021-04-19 PROCEDURE — 84484 ASSAY OF TROPONIN QUANT: CPT

## 2021-04-19 PROCEDURE — 36415 COLL VENOUS BLD VENIPUNCTURE: CPT

## 2021-04-19 PROCEDURE — 97161 PT EVAL LOW COMPLEX 20 MIN: CPT

## 2021-04-19 PROCEDURE — 93456 R HRT CORONARY ARTERY ANGIO: CPT | Mod: 26

## 2021-04-19 PROCEDURE — 80061 LIPID PANEL: CPT

## 2021-04-19 PROCEDURE — 87635 SARS-COV-2 COVID-19 AMP PRB: CPT

## 2021-04-19 PROCEDURE — 83036 HEMOGLOBIN GLYCOSYLATED A1C: CPT

## 2021-04-19 PROCEDURE — 80053 COMPREHEN METABOLIC PANEL: CPT

## 2021-04-19 PROCEDURE — 84100 ASSAY OF PHOSPHORUS: CPT

## 2021-04-19 PROCEDURE — 85027 COMPLETE CBC AUTOMATED: CPT

## 2021-04-19 PROCEDURE — 99285 EMERGENCY DEPT VISIT HI MDM: CPT | Mod: 25

## 2021-04-19 PROCEDURE — 85025 COMPLETE CBC W/AUTO DIFF WBC: CPT

## 2021-04-19 PROCEDURE — 84443 ASSAY THYROID STIM HORMONE: CPT

## 2021-04-19 PROCEDURE — 71045 X-RAY EXAM CHEST 1 VIEW: CPT

## 2021-04-19 PROCEDURE — 86769 SARS-COV-2 COVID-19 ANTIBODY: CPT

## 2021-04-19 PROCEDURE — 93306 TTE W/DOPPLER COMPLETE: CPT

## 2021-04-19 PROCEDURE — 93005 ELECTROCARDIOGRAM TRACING: CPT

## 2021-04-19 PROCEDURE — 93010 ELECTROCARDIOGRAM REPORT: CPT

## 2021-04-19 RX ORDER — SODIUM CHLORIDE 9 MG/ML
3 INJECTION INTRAMUSCULAR; INTRAVENOUS; SUBCUTANEOUS EVERY 8 HOURS
Refills: 0 | Status: DISCONTINUED | OUTPATIENT
Start: 2021-04-19 | End: 2021-04-22

## 2021-04-19 RX ORDER — AMLODIPINE BESYLATE 2.5 MG/1
1 TABLET ORAL
Qty: 0 | Refills: 0 | DISCHARGE
Start: 2021-04-19

## 2021-04-19 RX ORDER — ATORVASTATIN CALCIUM 80 MG/1
40 TABLET, FILM COATED ORAL AT BEDTIME
Refills: 0 | Status: DISCONTINUED | OUTPATIENT
Start: 2021-04-19 | End: 2021-04-22

## 2021-04-19 RX ORDER — ZINC OXIDE 200 MG/G
1 OINTMENT TOPICAL
Qty: 0 | Refills: 0 | DISCHARGE
Start: 2021-04-19

## 2021-04-19 RX ORDER — PANTOPRAZOLE SODIUM 20 MG/1
1 TABLET, DELAYED RELEASE ORAL
Qty: 0 | Refills: 0 | DISCHARGE
Start: 2021-04-19

## 2021-04-19 RX ORDER — METOPROLOL TARTRATE 50 MG
1 TABLET ORAL
Qty: 0 | Refills: 0 | DISCHARGE
Start: 2021-04-19

## 2021-04-19 RX ORDER — ASPIRIN/CALCIUM CARB/MAGNESIUM 324 MG
81 TABLET ORAL DAILY
Refills: 0 | Status: DISCONTINUED | OUTPATIENT
Start: 2021-04-19 | End: 2021-04-22

## 2021-04-19 RX ORDER — ATORVASTATIN CALCIUM 80 MG/1
1 TABLET, FILM COATED ORAL
Qty: 0 | Refills: 0 | DISCHARGE
Start: 2021-04-19

## 2021-04-19 RX ORDER — METOPROLOL TARTRATE 50 MG
25 TABLET ORAL
Refills: 0 | Status: DISCONTINUED | OUTPATIENT
Start: 2021-04-19 | End: 2021-04-20

## 2021-04-19 RX ORDER — ASPIRIN/CALCIUM CARB/MAGNESIUM 324 MG
1 TABLET ORAL
Qty: 0 | Refills: 0 | DISCHARGE
Start: 2021-04-19

## 2021-04-19 RX ORDER — PANTOPRAZOLE SODIUM 20 MG/1
40 TABLET, DELAYED RELEASE ORAL
Refills: 0 | Status: DISCONTINUED | OUTPATIENT
Start: 2021-04-19 | End: 2021-04-22

## 2021-04-19 RX ORDER — AMLODIPINE BESYLATE 2.5 MG/1
5 TABLET ORAL DAILY
Refills: 0 | Status: DISCONTINUED | OUTPATIENT
Start: 2021-04-19 | End: 2021-04-22

## 2021-04-19 RX ADMIN — AMLODIPINE BESYLATE 5 MILLIGRAM(S): 2.5 TABLET ORAL at 06:44

## 2021-04-19 RX ADMIN — Medication 81 MILLIGRAM(S): at 11:55

## 2021-04-19 RX ADMIN — Medication 25 MILLIGRAM(S): at 06:44

## 2021-04-19 RX ADMIN — Medication 25 MILLIGRAM(S): at 18:31

## 2021-04-19 RX ADMIN — ZINC OXIDE 1 APPLICATION(S): 200 OINTMENT TOPICAL at 11:55

## 2021-04-19 RX ADMIN — PANTOPRAZOLE SODIUM 40 MILLIGRAM(S): 20 TABLET, DELAYED RELEASE ORAL at 06:44

## 2021-04-19 RX ADMIN — SODIUM CHLORIDE 3 MILLILITER(S): 9 INJECTION INTRAMUSCULAR; INTRAVENOUS; SUBCUTANEOUS at 21:00

## 2021-04-19 RX ADMIN — ATORVASTATIN CALCIUM 40 MILLIGRAM(S): 80 TABLET, FILM COATED ORAL at 21:02

## 2021-04-19 NOTE — PROGRESS NOTE ADULT - NSICDXPILOT_GEN_ALL_CORE
[place]

## 2021-04-19 NOTE — PROGRESS NOTE ADULT - ASSESSMENT
Patient is a 81 y/o Male from home with PMH of Alzheimer's disease, HTN, arthritis, depression  was brought by granddaughter due to chest pain and elevated BP. Patient admitted to for severe AS.

## 2021-04-19 NOTE — PROGRESS NOTE ADULT - SUBJECTIVE AND OBJECTIVE BOX
CHIEF COMPLAINT:Patient is a 80y old  Male who presents with a chief complaint of Elevated BP and chest pain.Pt appears comfortable.    	  REVIEW OF SYSTEMS:  CONSTITUTIONAL: No fever, weight loss, or fatigue  EYES: No eye pain, visual disturbances, or discharge  ENT:  No difficulty hearing, tinnitus, vertigo; No sinus or throat pain  NECK: No pain or stiffness  RESPIRATORY: No cough, wheezing, chills or hemoptysis; No Shortness of Breath  CARDIOVASCULAR: No chest pain, palpitations, passing out, dizziness, or leg swelling  GASTROINTESTINAL: No abdominal or epigastric pain. No nausea, vomiting, or hematemesis; No diarrhea or constipation. No melena or hematochezia.  GENITOURINARY: No dysuria, frequency, hematuria, or incontinence  NEUROLOGICAL: No headaches, memory loss, loss of strength, numbness, or tremors  SKIN: No itching, burning, rashes, or lesions   LYMPH Nodes: No enlarged glands  ENDOCRINE: No heat or cold intolerance; No hair loss  MUSCULOSKELETAL: No joint pain or swelling; No muscle, back, or extremity pain  PSYCHIATRIC: No depression, anxiety, mood swings, or difficulty sleeping  HEME/LYMPH: No easy bruising, or bleeding gums  ALLERGY AND IMMUNOLOGIC: No hives or eczema	        PHYSICAL EXAM:  T(C): 37.1 (04-19-21 @ 05:20), Max: 37.1 (04-19-21 @ 05:20)  HR: 65 (04-19-21 @ 05:20) (65 - 73)  BP: 136/64 (04-19-21 @ 05:20) (115/66 - 159/87)  RR: 18 (04-19-21 @ 05:20) (17 - 18)  SpO2: 97% (04-19-21 @ 05:20) (96% - 97%)        Appearance: Normal	  HEENT:   Normal oral mucosa, PERRL, EOMI	  Lymphatic: No lymphadenopathy  Cardiovascular: Normal S1 S2, No JVD, No murmurs, No edema  Respiratory: Lungs clear to auscultation	  Psychiatry: A & O x 3, Mood & affect appropriate  Gastrointestinal:  Soft, Non-tender, + BS	  Skin: No rashes, No ecchymoses, No cyanosis	  Neurologic: Non-focal  Extremities: Normal range of motion, No clubbing, cyanosis or edema  Vascular: Peripheral pulses palpable 2+ bilaterally    MEDICATIONS  (STANDING):  amLODIPine   Tablet 5 milliGRAM(s) Oral daily  aspirin  chewable 81 milliGRAM(s) Oral daily  atorvastatin 40 milliGRAM(s) Oral at bedtime  enoxaparin Injectable 40 milliGRAM(s) SubCutaneous daily  metoprolol tartrate 25 milliGRAM(s) Oral two times a day  pantoprazole    Tablet 40 milliGRAM(s) Oral before breakfast  zinc oxide 20% Ointment 1 Application(s) Topical daily        LABS:	 	      Lipid Profile: Cholesterol 157  HDL 49        TSH: Thyroid Stimulating Hormone, Serum: 3.62 uU/mL (04-14 @ 05:49)

## 2021-04-19 NOTE — TRANSFER ACCEPTANCE NOTE - HISTORY OF PRESENT ILLNESS
79 y/o Male from home with PMH of Alzheimer's disease, HTN, arthritis and depression was brought by granddaughter to [facility] ED due to chest pain and elevated BP. Pt is A&O x1 and poor historian so daughter was used for history. Patient admitted to telemetry for chest pain to r/o ACS. Troponins negative x3, no adverse events on Tele monitoring, treated with aspirin, statin and lopressor. Echocardiogram shows Ileana, grade 1DDF, severe AS (ORLY 0.7). Pt was made DNR/DNI but family is willing to pursue cardiac catheretization and TAVR so CODE status was changed at this time. 81 y/o Male from home with PMH of Alzheimer's disease, HTN, arthritis and depression was brought by granddaughter to [facility] ED due to chest pain and elevated BP. Pt is A&O x1 and poor historian so [id number]) was used for history. Patient admitted to telemetry for chest pain to r/o ACS. Troponins negative x3, no adverse events on Tele monitoring, treated with aspirin, statin and lopressor. Echocardiogram shows Ileana, grade 1DDF, severe AS (ORLY 0.7). Pt was made DNR/DNI but family is willing to pursue cardiac catheretization and TAVR so CODE status was changed at this time. 79 y/o Male from home with PMH of Alzheimer's disease, HTN, arthritis and depression was brought by granddaughter to [facility] ED due to chest pain and elevated BP. Pt is A&O x1 and poor historian so daughter (Nkechi Ruano) was used for history. Patient admitted to telemetry for chest pain to r/o ACS. Troponins negative x3, no adverse events on Tele monitoring, treated with aspirin, statin and lopressor. Echocardiogram shows Ileana, grade 1DDF, severe AS (ORLY 0.7). Pt was made DNR/DNI but family is willing to pursue cardiac catheretization and TAVR so CODE status was changed at this time.

## 2021-04-19 NOTE — PROGRESS NOTE ADULT - PROVIDER SPECIALTY LIST ADULT
Cardiology
Internal Medicine
Internal Medicine
Cardiology
Cardiology
Internal Medicine
Cardiology
Internal Medicine

## 2021-04-19 NOTE — ACUTE INTERFACILITY TRANSFER NOTE - PLAN OF CARE
Pt is not on BP medications for many years  - c/w amlodipine 5 mg od, lopressor 25 bid  - c/w atorvastatin 40 od  - monitor BPs. DVT PPX: Lovenox  GI PPX: PPI. pt was DNR/DNI, we will hold this CODE status for the cardiac catheterization procedure and TAVR Cardiac Catheterization and TAVR EKG NSR  - Troponin negative  - c/w Tele monitoring  - c/w aspirin, statin and lopressor  - Echocardiogram showed Ileana, grade 1DDF, severe AS (ORLY 0.7)  - Will be transferred to Cardiac Cath at at [facility] and then TAVR after  - Cardiology Dr. Garcia. Patient not on medication  - sundowning-delirium-attempting to get OOB without assistance  - s/p Haldol 2mg IVP x 1  - enhanced supervision.

## 2021-04-19 NOTE — ACUTE INTERFACILITY TRANSFER NOTE - SECONDARY DIAGNOSIS.
Need for prophylactic measure Alzheimer's dementia Goals of care, counseling/discussion Hypertension

## 2021-04-19 NOTE — ACUTE INTERFACILITY TRANSFER NOTE - HOSPITAL COURSE
Patient is a 79 y/o Male from home with PMH of Alzheimer's disease, HTN, arthritis, depression  was brought by granddaughter due to chest pain and elevated BP. Patient admitted to telemetry for chest pain r/o ACS.  For Chest pain possible ischemic demand, EKG NSR, Troponins negative x3, no adverse events on Tele monitoring, treated with aspirin, statin and lopressor, Echocardiogram shows Ileana, grade 1DDF, severe AS (ORLY 0.7). Cardiology Dr. Radha casas.   For HTN (hypertension).  Plan: Pt is not on BP medications for many years, started on amlodipine 5 mg and lopressor 25 bid.   For Alzheimer's disease,  Patient not on medication, sundowning-delirium-attempting to get OOB without assistance, was given Haldol 2mg IVP x 1, was on enhanced supervision.   Was on DVT ppx: Lovenox and GI PPX: PPI.    For Goals of care, pt was DNR/DNI, we will hold this CODE status for the cardiac catheterization procedure and TAVR    Pt will be transferred to [facility] for Cardiac Catheterization and TAVR after.

## 2021-04-19 NOTE — PROGRESS NOTE ADULT - PROBLEM SELECTOR PROBLEM 5
Goals of care, counseling/discussion
Discharge planning issues

## 2021-04-19 NOTE — CHART NOTE - NSCHARTNOTEFT_GEN_A_CORE
Patient seen at bedside, appears comfortable NAD has no complaints, s/p cardiac cath today, Rt wrist appears clean, dry, intact, no evidence of active bleeding, no hematoma, + pulses, good capillary refill. Continue to monitor. Patient seen at bedside, appears comfortable NAD has no complaints, s/p cardiac cath today, Rt wrist and Rt groin appears clean, dry, intact, no evidence of active bleeding, no hematoma, + pulses, good capillary refill. Continue to monitor.

## 2021-04-19 NOTE — TRANSFER ACCEPTANCE NOTE - ASSESSMENT
81 y/o Male from home with PMH of Alzheimer's disease, HTN, arthritis and depression was brought by granddaughter to [facility] ED due to chest pain and elevated BP    + Severe AS  + Chest pain

## 2021-04-19 NOTE — PROGRESS NOTE ADULT - ASSESSMENT
79 y/o M with PMH of Alzheimer's disease, HTN, arthritis, depression  was brought by granddaughter due to chest pain and elevated BP.  1.Echocardiogram shows sevre AS,family agree for  TAVR eval and cath.plan for transfer to [facility] for cath today.  2.HTN- lopressor 25mg  bid, norvasc.  3.Cont asa,statin.  4.Prediabetes-diet.  5.Gi and DVT prophylaxis.

## 2021-04-19 NOTE — PROGRESS NOTE ADULT - SUBJECTIVE AND OBJECTIVE BOX
PGY-1 Progress Note discussed with attending    PAGER #: [[phone]] TILL 5:00 PM  PLEASE CONTACT ON CALL TEAM:  - On Call Team (Please refer to iTmo) FROM 5:00 PM - 8:30PM  - Nightfloat Team FROM 8:30 -7:30 AM    INTERVAL HPI/OVERNIGHT EVENTS:   No adverse events overnight. Pt will get transferred to [facility] for cardiac cath and TAVR.    REVIEW OF SYSTEMS:  CONSTITUTIONAL: No fever, weight loss, or fatigue  RESPIRATORY: No cough, wheezing, chills or hemoptysis; No shortness of breath  CARDIOVASCULAR: No chest pain, palpitations, dizziness, or leg swelling  GASTROINTESTINAL: No abdominal pain. No nausea, vomiting, or hematemesis; No diarrhea or constipation. No melena or hematochezia.  GENITOURINARY: No dysuria or hematuria, urinary frequency  NEUROLOGICAL: No headaches, memory loss, loss of strength, numbness, or tremors  SKIN: No itching, burning, rashes, or lesions     Vital Signs Last 24 Hrs  T(C): 37.1 (19 Apr 2021 05:20), Max: 37.1 (19 Apr 2021 05:20)  T(F): 98.7 (19 Apr 2021 05:20), Max: 98.7 (19 Apr 2021 05:20)  HR: 65 (19 Apr 2021 05:20) (65 - 73)  BP: 136/64 (19 Apr 2021 05:20) (115/66 - 159/87)  BP(mean): --  RR: 18 (19 Apr 2021 05:20) (17 - 18)  SpO2: 97% (19 Apr 2021 05:20) (96% - 97%)    PHYSICAL EXAMINATION:  GENERAL: NAD, well built  HEAD:  Atraumatic, Normocephalic  EYES:  conjunctiva and sclera clear  NECK: Supple, No JVD, Normal thyroid  CHEST/LUNG: Clear to auscultation. Clear to percussion bilaterally; No rales, rhonchi, wheezing, or rubs  HEART: Regular rate and rhythm; No murmurs, rubs, or gallops  ABDOMEN: Soft, Nontender, Nondistended; Bowel sounds present  NERVOUS SYSTEM:  Alert & Oriented X0,    EXTREMITIES:  2+ Peripheral Pulses, No clubbing, cyanosis, or edema  SKIN: warm dry      CAPILLARY BLOOD GLUCOSE      RADIOLOGY & ADDITIONAL TESTS:

## 2021-04-19 NOTE — PROGRESS NOTE ADULT - REASON FOR ADMISSION
Elevated BP and chest pain

## 2021-04-19 NOTE — PROGRESS NOTE ADULT - PROBLEM SELECTOR PLAN 1
- EKG NSR  - Troponin negative  - c/w Tele monitoring  - c/w aspirin, statin and lopressor  - Echocardiogram showed Ileana, grade 1DDF, severe AS (ORLY 0.7)  - Will be transferred to Cardiac Cath and TAVR at [facility]   - Cardiology Dr. Garcia

## 2021-04-20 ENCOUNTER — TRANSCRIPTION ENCOUNTER (OUTPATIENT)
Age: 81
End: 2021-04-20

## 2021-04-20 LAB
ANION GAP SERPL CALC-SCNC: 9 MMOL/L — SIGNIFICANT CHANGE UP (ref 7–14)
BUN SERPL-MCNC: 17 MG/DL — SIGNIFICANT CHANGE UP (ref 7–23)
CALCIUM SERPL-MCNC: 9.1 MG/DL — SIGNIFICANT CHANGE UP (ref 8.4–10.5)
CHLORIDE SERPL-SCNC: 104 MMOL/L — SIGNIFICANT CHANGE UP (ref 98–107)
CO2 SERPL-SCNC: 24 MMOL/L — SIGNIFICANT CHANGE UP (ref 22–31)
COVID-19 SPIKE DOMAIN AB INTERP: NEGATIVE — SIGNIFICANT CHANGE UP
COVID-19 SPIKE DOMAIN ANTIBODY RESULT: 0.4 U/ML — SIGNIFICANT CHANGE UP
CREAT SERPL-MCNC: 0.73 MG/DL — SIGNIFICANT CHANGE UP (ref 0.5–1.3)
GLUCOSE SERPL-MCNC: 103 MG/DL — HIGH (ref 70–99)
HCT VFR BLD CALC: 41.2 % — SIGNIFICANT CHANGE UP (ref 39–50)
HGB BLD-MCNC: 14 G/DL — SIGNIFICANT CHANGE UP (ref 13–17)
MAGNESIUM SERPL-MCNC: 2.2 MG/DL — SIGNIFICANT CHANGE UP (ref 1.6–2.6)
MCHC RBC-ENTMCNC: 32 PG — SIGNIFICANT CHANGE UP (ref 27–34)
MCHC RBC-ENTMCNC: 34 GM/DL — SIGNIFICANT CHANGE UP (ref 32–36)
MCV RBC AUTO: 94.1 FL — SIGNIFICANT CHANGE UP (ref 80–100)
NRBC # BLD: 0 /100 WBCS — SIGNIFICANT CHANGE UP
NRBC # FLD: 0 K/UL — SIGNIFICANT CHANGE UP
PHOSPHATE SERPL-MCNC: 3.2 MG/DL — SIGNIFICANT CHANGE UP (ref 2.5–4.5)
PLATELET # BLD AUTO: 208 K/UL — SIGNIFICANT CHANGE UP (ref 150–400)
POTASSIUM SERPL-MCNC: 3.7 MMOL/L — SIGNIFICANT CHANGE UP (ref 3.5–5.3)
POTASSIUM SERPL-SCNC: 3.7 MMOL/L — SIGNIFICANT CHANGE UP (ref 3.5–5.3)
RBC # BLD: 4.38 M/UL — SIGNIFICANT CHANGE UP (ref 4.2–5.8)
RBC # FLD: 12.5 % — SIGNIFICANT CHANGE UP (ref 10.3–14.5)
SARS-COV-2 IGG+IGM SERPL QL IA: 0.4 U/ML — SIGNIFICANT CHANGE UP
SARS-COV-2 IGG+IGM SERPL QL IA: NEGATIVE — SIGNIFICANT CHANGE UP
SODIUM SERPL-SCNC: 137 MMOL/L — SIGNIFICANT CHANGE UP (ref 135–145)
WBC # BLD: 8.94 K/UL — SIGNIFICANT CHANGE UP (ref 3.8–10.5)
WBC # FLD AUTO: 8.94 K/UL — SIGNIFICANT CHANGE UP (ref 3.8–10.5)

## 2021-04-20 PROCEDURE — 99233 SBSQ HOSP IP/OBS HIGH 50: CPT

## 2021-04-20 RX ADMIN — SODIUM CHLORIDE 3 MILLILITER(S): 9 INJECTION INTRAMUSCULAR; INTRAVENOUS; SUBCUTANEOUS at 13:36

## 2021-04-20 RX ADMIN — PANTOPRAZOLE SODIUM 40 MILLIGRAM(S): 20 TABLET, DELAYED RELEASE ORAL at 05:38

## 2021-04-20 RX ADMIN — SODIUM CHLORIDE 3 MILLILITER(S): 9 INJECTION INTRAMUSCULAR; INTRAVENOUS; SUBCUTANEOUS at 21:13

## 2021-04-20 RX ADMIN — SODIUM CHLORIDE 3 MILLILITER(S): 9 INJECTION INTRAMUSCULAR; INTRAVENOUS; SUBCUTANEOUS at 05:31

## 2021-04-20 RX ADMIN — ATORVASTATIN CALCIUM 40 MILLIGRAM(S): 80 TABLET, FILM COATED ORAL at 21:16

## 2021-04-20 RX ADMIN — Medication 25 MILLIGRAM(S): at 05:38

## 2021-04-20 RX ADMIN — AMLODIPINE BESYLATE 5 MILLIGRAM(S): 2.5 TABLET ORAL at 05:38

## 2021-04-20 RX ADMIN — Medication 81 MILLIGRAM(S): at 11:55

## 2021-04-20 NOTE — CHART NOTE - NSCHARTNOTEFT_GEN_A_CORE
Notified by RN that pt had a pause of 3.1 seconds, Pt Seen in Bed Denies any Chest pain, dizziness, nausea or palpitations  Upon review pt has had several pauses at  ranging from 2.1 to 3.8  will Hold Metoprolol for Now, and discuss further with Cardiology

## 2021-04-20 NOTE — CONSULT NOTE ADULT - ASSESSMENT
80 year old male with a PMH of HTN, Alzheimer's disease, arthritis, depression and recently diagnosed severe aortic stenosis who was transferred to [facility] from [facility] for [facility] prior to TAVR surgery. Left heart catheterization showed luminal coronary diseases and echo showed normal LVEF. Patient is found to have significant sinus node dysfunction evidenced by episodes of sinus pauses up to 3.8 seconds and one near syncopal event and dizziness for few months.  Therefore, a permanent pacemaker is recommended.  Discussed with patient's daughter, she is considering.   -Plan for a PPM implantation if patient's daughter agrees  -d/c beta-blocker   -Continue to monitor for sinus pauses >3 seconds  -Keep NPO after midnight for possible PPM tomorrow  -Discussed with Dr. Leon

## 2021-04-20 NOTE — CONSULT NOTE ADULT - ATTENDING COMMENTS
80 year old male with a PMH of HTN, Alzheimer's disease, arthritis, depression and recently diagnosed severe aortic stenosis who was transferred to [facility] from [facility] for [facility] prior to TAVR surgery. Left heart catheterization showed luminal coronary diseases and echo showed normal LVEF. Patient is found to have significant sinus node dysfunction evidenced by episodes of sinus pauses up to 3.8 seconds and one near syncopal event and dizziness for few months.  Therefore, a permanent pacemaker is recommended.  Discussed with patient's daughter, she is considering. Will follow.

## 2021-04-20 NOTE — DISCHARGE NOTE PROVIDER - NSDCCPCAREPLAN_GEN_ALL_CORE_FT
PRINCIPAL DISCHARGE DIAGNOSIS  Diagnosis: Severe aortic stenosis  Assessment and Plan of Treatment: Plan for TAVR      SECONDARY DISCHARGE DIAGNOSES  Diagnosis: Alzheimer's disease  Assessment and Plan of Treatment: Alzheimer's disease    Diagnosis: Essential hypertension  Assessment and Plan of Treatment: Low sodium and fat diet, continue anti-hypertensive medications, and follow up with primary care physician.       PRINCIPAL DISCHARGE DIAGNOSIS  Diagnosis: Severe aortic stenosis  Assessment and Plan of Treatment: Plan for TAVR  Follow up with Dr. Olive Jimenez for surgical evaluation and scheduling      SECONDARY DISCHARGE DIAGNOSES  Diagnosis: Essential hypertension  Assessment and Plan of Treatment: Continue blood pressure medication regimen as directed. Monitor for any visual changes, headaches or dizziness.  Monitor blood pressure regularly.  Follow up with your PCP for further management for high blood pressure.      Diagnosis: Alzheimer's disease  Assessment and Plan of Treatment: Follow upw ith your PCP outpatient     PRINCIPAL DISCHARGE DIAGNOSIS  Diagnosis: Severe aortic stenosis  Assessment and Plan of Treatment: Plan for TAVR  Follow up with Dr. Olive Jimenez for surgical evaluation and scheduling      SECONDARY DISCHARGE DIAGNOSES  Diagnosis: Bradycardia  Assessment and Plan of Treatment: You had a pacemake placed during your stay  Follow up with Electrophysiologists on May 6th as scheduled    Diagnosis: Essential hypertension  Assessment and Plan of Treatment: Continue blood pressure medication regimen as directed. Monitor for any visual changes, headaches or dizziness.  Monitor blood pressure regularly.  Follow up with your PCP for further management for high blood pressure.      Diagnosis: Alzheimer's disease  Assessment and Plan of Treatment: Follow upw ith your PCP outpatient

## 2021-04-20 NOTE — CONSULT NOTE ADULT - SUBJECTIVE AND OBJECTIVE BOX
Patient is a 80y old  Male who presents with a chief complaint of chest pain (19 Apr 2021 15:01)          HPI:  80 year old male with a PMH of HTN, Alzheimer's disease, arthritis, depression and recently diagnosed severe aortic stenosis who was transferred to [facility] from [facility] for LHC prior to TAVR surgery. Left heart catheterization showed luminal coronary diseases and echo showed normal LVEF.  Telemetry demonstrated SR 50-60's with episodes of sinus pauses up to 3.8 seconds.  Baseline patient is oriented x1 only.  He denies lightheadedness or dizziness.  Spoken to patient's daughter Nkechi ([phone]) who confirmed that patient had one near syncopal event and c/o dizziness for few months.  His beta-blocker is on hold.     PAST MEDICAL & SURGICAL HISTORY:  Alzheimer&#x27;s disease    HTN (hypertension)        MEDICATIONS  (STANDING):  amLODIPine   Tablet 5 milliGRAM(s) Oral daily  aspirin enteric coated 81 milliGRAM(s) Oral daily  atorvastatin 40 milliGRAM(s) Oral at bedtime  pantoprazole    Tablet 40 milliGRAM(s) Oral before breakfast  sodium chloride 0.9% lock flush 3 milliLiter(s) IV Push every 8 hours    MEDICATIONS  (PRN):    Allergies    No Known Allergies    Intolerances  FAMILY HISTORY:      SOCIAL HISTORY:  Denies smoking; no  Alcohol  or  Drug abuse     REVIEW OF SYSTEMS:    CONSTITUTIONAL: No fever, weight loss, chills, shakes, or fatigue  EYES: No eye pain, visual disturbances, or discharge  ENMT:  No difficulty hearing, tinnitus, vertigo; No sinus or throat pain  NECK: No pain or stiffness  RESPIRATORY: No cough, wheezing, hemoptysis, or shortness of breath  CARDIOVASCULAR: No dyspnea, palpitations, syncope, paroxysmal nocturnal dyspnea, orthopnea, or arm or leg swelling  +dizziness, +chest pain,  GASTROINTESTINAL: No abdominal  or epigastric pain, nausea, vomiting, hematemesis, diarrhea, constipation, melena or bright red blood.  GENITOURINARY: No dysuria, nocturia, hematuria, or urinary incontinence  NEUROLOGICAL: No headaches, memory loss, slurred speech, limb weakness, loss of strength, numbness, or tremors  SKIN: No itching, burning, rashes, or lesions   LYMPH NODES: No enlarged glands  ENDOCRINE: No heat or cold intolerance, or hair loss  MUSCULOSKELETAL: No joint pain or swelling, muscle, back, or extremity pain  PSYCHIATRIC: No depression, anxiety, or difficulty sleeping  HEME/LYMPH: No easy bruising or bleeding gums  ALLERY AND IMMUNOLOGIC: No hives or rash.      Vital Signs Last 24 Hrs  T(C): 36.7 (20 Apr 2021 11:45), Max: 36.7 (20 Apr 2021 05:36)  T(F): 98 (20 Apr 2021 11:45), Max: 98.1 (20 Apr 2021 05:36)  HR: 58 (20 Apr 2021 11:45) (58 - 70)  BP: 124/74 (20 Apr 2021 11:45) (122/56 - 143/75)  BP(mean): --  RR: 16 (20 Apr 2021 11:45) (16 - 16)  SpO2: 99% (20 Apr 2021 11:45) (98% - 99%)    PHYSICAL EXAM:    GENERAL: In no apparent distress, well nourished, and hydrated.  HEAD:  Atraumatic, Normocephalic  NECK: Supple . No JVD or carotid bruit or thyroidmegaly.  Carotid pulse is 2+ bilaterally.  PULMONARY: Clear to auscultation and perfusion.  No rales, wheezing, or rhonchi bilaterally.  HEART: Regular rate and rhythm; 2/6 systolic murmurs, no rubs, or gallops.  ABDOMEN: Soft, Nontender, Nondistended; Bowel sounds present  EXTREMITIES: No clubbing, cyanosis, or edema  NEUROLOGICAL: Alert oriented to person only Speech clear.  Skin: Dry intact, no rashes or lesions.          INTERPRETATION OF TELEMETRY:  Sinus rhythm with occasional sinus pauses 2-3.8 seconds    ECG: NSR 62; normal intervals; non-specific T wave abnormality        LABS:                        14.0   8.94  )-----------( 208      ( 20 Apr 2021 07:13 )             41.2     04-20    137  |  104  |  17  ----------------------------<  103<H>  3.7   |  24  |  0.73    Ca    9.1      20 Apr 2021 07:13  Phos  3.2     04-20  Mg     2.2     04-20    TPro  7.4  /  Alb  3.0<L>  /  TBili  0.6  /  DBili  x   /  AST  16  /  ALT  38  /  AlkPhos  67  04-19              BNP  RADIOLOGY & ADDITIONAL STUDIES:  PREVIOUS DIAGNOSTIC TESTING:      ECHO FINDINGS:  CONCLUSIONS:  1. Mild posterior mitral annular calcification. Trace  mitral regurgitation.  2. Calcified trileaflet aortic valve with decreased  opening. Severe aortic stenosis (ORLY 0.7 cm2 by continuity  equation). Mild aortic regurgitation ( msec).  3. Normal aortic root.  4. Normal left atrium.  5. Moderate concentric left ventricular hypertrophy.  6. Normal Left Ventricular Systolic Function,  (EF = 62% by  biplane). Peak left ventricular outflow tract gradient  equals 24.4 mm Hg, consistent with mild dynamic LVOT  obstruction.  7. Grade I diastolic dysfunction (Impaired relaxation).  8. Normal right atrium.  9. Normal right ventricular size and systolic function  (TAPSE 1.8 cm).      STRESS FINDINGS:    CATHETERIZATION FINDINGS:

## 2021-04-20 NOTE — DISCHARGE NOTE PROVIDER - CARE PROVIDER_API CALL
Olive Jimenez)  Cardiology; Internal Medicine  [phone] [place] [address], [address]  [place], [place] [id number]  Phone: [phone]  Fax: [phone]  Follow Up Time:

## 2021-04-20 NOTE — DISCHARGE NOTE PROVIDER - HOSPITAL COURSE
79 y/o Male from home with PMH of Alzheimer's disease, HTN, arthritis and depression was brought by granddaughter to [facility] ED due to chest pain and elevated BP    + Severe AS  + Chest pain    ·  Problem: Chest pain.  s/p LHC + RHC : luminal dz, PCWP 5, CO 5.54, CI 2.85, RRA and RFV accessed    Continue ASA 81mg and Lipitor daily.       ·  Problem: Severe aortic stenosis.  Plan: RHC to assess AS gradient  Workup for TAVR.      Problem/Plan - 3:  ·  Problem: Essential hypertension.  Plan: Continue Amlodipine and Metoprolol.      Problem/Plan - 4:  ·  Problem: Alzheimer's disease.  Plan: Will admit with 1:1 as there is concern for Sundowning  IV Haldol PRN for agitation.    79 y/o Male from home with PMH of Alzheimer's disease, HTN, arthritis and depression was brought by granddaughter to [facility] ED due to chest pain and elevated BP    + Severe AS  + Chest pain    ·  Problem: Chest pain.  s/p LHC + RHC : luminal dz, PCWP 5, CO 5.54, CI 2.85, RRA and RFV accessed    Continue ASA 81mg and Lipitor daily.     Severe aortic stenosis. RHC to assess AS gradient. Workup for TAVR.  Outpatient work up for TAVY with Dr. Jimenez     Essential hypertension.  Continue Amlodipine and Metoprolol.     Alzheimer's disease.  Will admit with 1:1 as there is concern for Sundowning  IV Haldol PRN for agitation.     Cleared for discharge home per Dr. Cloud - patient for outpatient follow up with Dr. SHAYY Jimenez

## 2021-04-20 NOTE — DISCHARGE NOTE PROVIDER - NSDCMRMEDTOKEN_GEN_ALL_CORE_FT
amLODIPine 5 mg oral tablet: 1 tab(s) orally once a day  aspirin 81 mg oral tablet, chewable: 1 tab(s) orally once a day  atorvastatin 40 mg oral tablet: 1 tab(s) orally once a day (at bedtime)  metoprolol tartrate 25 mg oral tablet: 1 tab(s) orally 2 times a day  pantoprazole 40 mg oral delayed release tablet: 1 tab(s) orally once a day (before a meal)  zinc oxide 20% topical ointment: 1 application topically once a day   amLODIPine 5 mg oral tablet: 1 tab(s) orally once a day  aspirin 81 mg oral tablet, chewable: 1 tab(s) orally once a day  atorvastatin 40 mg oral tablet: 1 tab(s) orally once a day (at bedtime)  metoprolol tartrate 25 mg oral tablet: 1 tab(s) orally 2 times a day  pantoprazole 40 mg oral delayed release tablet: 1 tab(s) orally once a day (before a meal)

## 2021-04-20 NOTE — PROGRESS NOTE ADULT - SUBJECTIVE AND OBJECTIVE BOX
DATE OF SERVICE:  Patient was seen and examined ,interim events noted.Consultant notes ,Labs,Telemetry reviewed by me    PRESENTING CC:    HPI and HOSPITAL COURSE: HPI:  79 y/o Male from home with PMH of Alzheimer's disease, HTN, arthritis and depression was brought by granddaughter to [facility] ED due to chest pain and elevated BP. Pt is A&O x1 and poor historian so daughter (Nkecih Ruano) was used for history. Patient admitted to telemetry for chest pain to r/o ACS. Troponins negative x3, no adverse events on Tele monitoring, treated with aspirin, statin and lopressor. Echocardiogram shows Ileana, grade 1DDF, severe AS (ORLY 0.7). Pt was made DNR/DNI but family is willing to pursue cardiac catheretization and TAVR so CODE status was changed at this time. (19 Apr 2021 15:01)      INTERIM EVENTS:      PMH -reviewed admission note, no change since admission  Heart Failure: Acute [ ] Chronic [ ] Acute on Chronic [ ] Diastolic [ ] Systolic [ ] Combined Systolic and Diastolic[ ]  JESSICA[ ]  ATN[ ]  CKD I [ ] CKDII [ ] CKD III [ ] CKD IV [ ] CKD V [ ] ESRD[ ]  HTN[ ] CVA[ ] DM[ ] COPD[ ] COVID[ ] AF[ ]  PPM[ ] ICD[ ]    MEDICATIONS  (STANDING):  amLODIPine   Tablet 5 milliGRAM(s) Oral daily  aspirin enteric coated 81 milliGRAM(s) Oral daily  atorvastatin 40 milliGRAM(s) Oral at bedtime  pantoprazole    Tablet 40 milliGRAM(s) Oral before breakfast  sodium chloride 0.9% lock flush 3 milliLiter(s) IV Push every 8 hours    MEDICATIONS  (PRN):            REVIEW OF SYSTEMS:  Constitutional: [ ] fever, [ ]weight loss,  [ ]fatigue  Eyes: [ ] visual changes  Respiratory: [ ]shortness of breath;  [ ] cough, [ ]wheezing, [ ]chills, [ ]hemoptysis  Cardiovascular: [ ] chest pain, [ ]palpitations, [ ]dizziness,  [ ]leg swelling[ ]orthopnea[ ]PND  Gastrointestinal: [ ] abdominal pain, [ ]nausea, [ ]vomiting,  [ ]diarrhea [ ]Constipation [ ]Melena  Genitourinary: [ ] dysuria, [ ] hematuria [ ]Aleman  Neurologic: [ ] headaches [ ] tremors[ ]weakness [ ]Paralysis Right[ ] Left[ ]  Skin: [ ] itching, [ ]burning, [ ] rashes  Endocrine: [ ] heat or cold intolerance  Musculoskeletal: [ ] joint pain or swelling; [ ] muscle, back, or extremity pain  Psychiatric: [ ] depression, [ ]anxiety, [ ]mood swings, or [ ]difficulty sleeping  Hematologic: [ ] easy bruising, [ ] bleeding gums    [x] All remaining systems negative except as per above.   [ ]Unable to obtain.    Vital Signs Last 24 Hrs  T(C): 36.7 (20 Apr 2021 11:45), Max: 36.7 (20 Apr 2021 05:36)  T(F): 98 (20 Apr 2021 11:45), Max: 98.1 (20 Apr 2021 05:36)  HR: 58 (20 Apr 2021 11:45) (58 - 70)  BP: 124/74 (20 Apr 2021 11:45) (122/56 - 143/75)  BP(mean): --  RR: 16 (20 Apr 2021 11:45) (16 - 16)  SpO2: 99% (20 Apr 2021 11:45) (98% - 99%)  I&O's Summary      PHYSICAL EXAM:  General: No acute distress BMI-  HEENT: EOMI, PERRL  Neck: Supple, [ ] JVD  Lungs: Equal air entry bilaterally; [ ] rales [ ] wheezing [ ] rhonchi  Heart: Regular rate and rhythm; [ ] murmur   /6 [ ] systolic [ ] diastolic [ ] radiation[ ] rubs [ ]  gallops  Abdomen: Nontender, bowel sounds present  Extremities: No clubbing, cyanosis, [ ] edema [ ]Pulses  equal and intact  Nervous system:  Alert & Oriented X3, no focal deficits  Psychiatric: Normal affect  Skin: No rashes or lesions    LABS:  04-20    137  |  104  |  17  ----------------------------<  103<H>  3.7   |  24  |  0.73    Ca    9.1      20 Apr 2021 07:13  Phos  3.2     04-20  Mg     2.2     04-20    TPro  7.4  /  Alb  3.0<L>  /  TBili  0.6  /  DBili  x   /  AST  16  /  ALT  38  /  AlkPhos  67  04-19    Creatinine Trend: 0.73<--, 0.79<--, 0.59<--, 0.83<--, 0.81<--                        14.0   8.94  )-----------( 208      ( 20 Apr 2021 07:13 )             41.2         Cardiac Enzymes:           RADIOLOGY:    ECG [my interpretation]:    TELEMETRY:Reviewed monitor tracings-    ECHO:    STRESS TEST:    CATHETERIZATION:      IMPRESSION AND PLAN:

## 2021-04-21 LAB
ANION GAP SERPL CALC-SCNC: 10 MMOL/L — SIGNIFICANT CHANGE UP (ref 7–14)
BASOPHILS # BLD AUTO: 0.03 K/UL — SIGNIFICANT CHANGE UP (ref 0–0.2)
BASOPHILS NFR BLD AUTO: 0.3 % — SIGNIFICANT CHANGE UP (ref 0–2)
BUN SERPL-MCNC: 17 MG/DL — SIGNIFICANT CHANGE UP (ref 7–23)
CALCIUM SERPL-MCNC: 8.9 MG/DL — SIGNIFICANT CHANGE UP (ref 8.4–10.5)
CHLORIDE SERPL-SCNC: 103 MMOL/L — SIGNIFICANT CHANGE UP (ref 98–107)
CO2 SERPL-SCNC: 22 MMOL/L — SIGNIFICANT CHANGE UP (ref 22–31)
CREAT SERPL-MCNC: 0.7 MG/DL — SIGNIFICANT CHANGE UP (ref 0.5–1.3)
EOSINOPHIL # BLD AUTO: 0.17 K/UL — SIGNIFICANT CHANGE UP (ref 0–0.5)
EOSINOPHIL NFR BLD AUTO: 1.7 % — SIGNIFICANT CHANGE UP (ref 0–6)
GLUCOSE SERPL-MCNC: 93 MG/DL — SIGNIFICANT CHANGE UP (ref 70–99)
HCT VFR BLD CALC: 40.7 % — SIGNIFICANT CHANGE UP (ref 39–50)
HGB BLD-MCNC: 14.2 G/DL — SIGNIFICANT CHANGE UP (ref 13–17)
IANC: 6.24 K/UL — SIGNIFICANT CHANGE UP (ref 1.5–8.5)
IMM GRANULOCYTES NFR BLD AUTO: 0.5 % — SIGNIFICANT CHANGE UP (ref 0–1.5)
LYMPHOCYTES # BLD AUTO: 2.6 K/UL — SIGNIFICANT CHANGE UP (ref 1–3.3)
LYMPHOCYTES # BLD AUTO: 25.3 % — SIGNIFICANT CHANGE UP (ref 13–44)
MAGNESIUM SERPL-MCNC: 2.2 MG/DL — SIGNIFICANT CHANGE UP (ref 1.6–2.6)
MCHC RBC-ENTMCNC: 31.9 PG — SIGNIFICANT CHANGE UP (ref 27–34)
MCHC RBC-ENTMCNC: 34.9 GM/DL — SIGNIFICANT CHANGE UP (ref 32–36)
MCV RBC AUTO: 91.5 FL — SIGNIFICANT CHANGE UP (ref 80–100)
MONOCYTES # BLD AUTO: 1.17 K/UL — HIGH (ref 0–0.9)
MONOCYTES NFR BLD AUTO: 11.4 % — SIGNIFICANT CHANGE UP (ref 2–14)
NEUTROPHILS # BLD AUTO: 6.24 K/UL — SIGNIFICANT CHANGE UP (ref 1.8–7.4)
NEUTROPHILS NFR BLD AUTO: 60.8 % — SIGNIFICANT CHANGE UP (ref 43–77)
NRBC # BLD: 0 /100 WBCS — SIGNIFICANT CHANGE UP
NRBC # FLD: 0 K/UL — SIGNIFICANT CHANGE UP
PHOSPHATE SERPL-MCNC: 2.5 MG/DL — SIGNIFICANT CHANGE UP (ref 2.5–4.5)
PLATELET # BLD AUTO: 219 K/UL — SIGNIFICANT CHANGE UP (ref 150–400)
POTASSIUM SERPL-MCNC: 3.2 MMOL/L — LOW (ref 3.5–5.3)
POTASSIUM SERPL-SCNC: 3.2 MMOL/L — LOW (ref 3.5–5.3)
RBC # BLD: 4.45 M/UL — SIGNIFICANT CHANGE UP (ref 4.2–5.8)
RBC # FLD: 12.4 % — SIGNIFICANT CHANGE UP (ref 10.3–14.5)
SODIUM SERPL-SCNC: 135 MMOL/L — SIGNIFICANT CHANGE UP (ref 135–145)
WBC # BLD: 10.26 K/UL — SIGNIFICANT CHANGE UP (ref 3.8–10.5)
WBC # FLD AUTO: 10.26 K/UL — SIGNIFICANT CHANGE UP (ref 3.8–10.5)

## 2021-04-21 PROCEDURE — 71045 X-RAY EXAM CHEST 1 VIEW: CPT | Mod: 26

## 2021-04-21 PROCEDURE — 93010 ELECTROCARDIOGRAM REPORT: CPT

## 2021-04-21 PROCEDURE — 33208 INSRT HEART PM ATRIAL & VENT: CPT

## 2021-04-21 RX ORDER — POTASSIUM CHLORIDE 20 MEQ
10 PACKET (EA) ORAL
Refills: 0 | Status: COMPLETED | OUTPATIENT
Start: 2021-04-21 | End: 2021-04-21

## 2021-04-21 RX ORDER — CEFAZOLIN SODIUM 1 G
1000 VIAL (EA) INJECTION EVERY 8 HOURS
Refills: 0 | Status: COMPLETED | OUTPATIENT
Start: 2021-04-22 | End: 2021-04-22

## 2021-04-21 RX ADMIN — ATORVASTATIN CALCIUM 40 MILLIGRAM(S): 80 TABLET, FILM COATED ORAL at 21:03

## 2021-04-21 RX ADMIN — SODIUM CHLORIDE 3 MILLILITER(S): 9 INJECTION INTRAMUSCULAR; INTRAVENOUS; SUBCUTANEOUS at 05:27

## 2021-04-21 RX ADMIN — PANTOPRAZOLE SODIUM 40 MILLIGRAM(S): 20 TABLET, DELAYED RELEASE ORAL at 05:27

## 2021-04-21 RX ADMIN — Medication 81 MILLIGRAM(S): at 10:45

## 2021-04-21 RX ADMIN — SODIUM CHLORIDE 3 MILLILITER(S): 9 INJECTION INTRAMUSCULAR; INTRAVENOUS; SUBCUTANEOUS at 14:00

## 2021-04-21 RX ADMIN — SODIUM CHLORIDE 3 MILLILITER(S): 9 INJECTION INTRAMUSCULAR; INTRAVENOUS; SUBCUTANEOUS at 21:02

## 2021-04-21 RX ADMIN — AMLODIPINE BESYLATE 5 MILLIGRAM(S): 2.5 TABLET ORAL at 05:27

## 2021-04-21 RX ADMIN — Medication 100 MILLIEQUIVALENT(S): at 09:12

## 2021-04-21 RX ADMIN — Medication 100 MILLIEQUIVALENT(S): at 08:06

## 2021-04-21 NOTE — PROVIDER CONTACT NOTE (OTHER) - ACTION/TREATMENT ORDERED:
No intervention at this time, will continue to monitor
will continue to monitor
will continue to monitor

## 2021-04-21 NOTE — CHART NOTE - NSCHARTNOTEFT_GEN_A_CORE
NPO for PPM implantation today.  AxO x2 this am.   Assessment see pre-procedure record.  Replete K with KCL 10meq IV over 1 hours x2 for K 3.2 this am.  Tele recorded pauses 2.2 seconds this am.

## 2021-04-21 NOTE — CHART NOTE - NSCHARTNOTEFT_GEN_A_CORE
Type of Procedure: PPM implant  Licensed independent practitioner: Ada Leon MD  Assistant: None  Description of procedure:   After informed consent was obtained, the patient was brought to the EP laboratory in the fasting state and prepped and draped in the usual sterile manner.  Vancomycin 1 gm IV was administered prophylactically.   Local anesthetic was delivered to the left pectoral region and an incision was made in the left deltopectoral groove. The incision was extended inward using blunt dissection and the left cephalic vein was identified. The vein was cannulated and a retaining wire was placed in the IVC under fluoroscopy.  A 7F peel-away sheath was placed in the cephalic vein and a MDT 2cm active fixation lead was advanced to the RV apex under fluoroscopic guidance. Ventricular sensing and pacing thresholds were checked and were good.  Pacing at 10V was performed from the ventricular lead without diaphragmatic or intercostal capture. Next a 45cm active fixation lead was positioned in the RAA. Atrial sensing and pacing thresholds were checked and were good. The leads were next sutured to the underlying pectoralis muscle using 2-0 Ethibond suture.  Final pacing and sensing thresholds were checked and were adequate. A subcutaneous pocket was then created using blunt dissection and it was copiously irrigated with a saline solution containing gentamicin. The lead was connected to a MDT DDD generator and the entire system was implanted into the pocket.   The subcutaneous tissues were then closed with a layer of interrupted 2-0 vicryl suture and a running 2-0 vicryl stitch.  The skin was closed with dermabond.  The wound was covered with a dry sterile dressing. The patient tolerated the procedure well and left the laboratory in good condition.  Conscious sedation was provided and appropriate monitoring was performed by members of the EP nursing staff and Anesthesia.  During the procedure, a MDT rep was present to manage a complex .    Findings of procedure: None  Estimated blood loss: <10cc  Specimen removed: N/A  Preoperative Dx: Sinus node dysfunction  Postoperative Dx: Sinus node dysfunction  Complications: None  Anesthesia type: Conscious sedation

## 2021-04-21 NOTE — PROVIDER CONTACT NOTE (OTHER) - SITUATION
patient had a 2.2 second pause on tele, patient is going for PPM placement today
Patient with 3.1 sec pause
patient BP is greater than the defined parameters systolically

## 2021-04-21 NOTE — CHART NOTE - NSCHARTNOTEFT_GEN_A_CORE
Patient s/p PPM implant against left anterior chest wall.  Site with no hematoma or active bleed.  Patient without complaint. Will continue to monitor.      Vital Signs Last 24 Hrs  T(C): 36.8 (21 Apr 2021 19:45), Max: 37 (21 Apr 2021 11:28)  T(F): 98.2 (21 Apr 2021 19:45), Max: 98.6 (21 Apr 2021 11:28)  HR: 75 (21 Apr 2021 19:45) (62 - 75)  BP: 100/75 (21 Apr 2021 19:45) (100/75 - 130/70)  BP(mean): --  RR: 18 (21 Apr 2021 19:45) (16 - 18)  SpO2: 95% (21 Apr 2021 19:45) (95% - 100%)      Hyacinth Tong PA-C  Pager [phone]

## 2021-04-21 NOTE — PROGRESS NOTE ADULT - SUBJECTIVE AND OBJECTIVE BOX
DATE OF SERVICE: 04/21/2021 Patient was seen and examined ,interim events noted.Consultant notes ,Labs,Telemetry reviewed by me    PRESENTING CC:Chest pain    HPI and HOSPITAL COURSE: HPI:  81 y/o Male from home with PMH of Alzheimer's disease, HTN, arthritis and depression was [facility] due to chest pain and elevated BP. Pt is A&O x1 and poor historian so daughter (Nkechi Ruano) was used for history. Patient admitted to telemetry for chest pain to r/o ACS. Troponins negative x3, no adverse events on Tele monitoring, treated with aspirin, statin and lopressor. Echocardiogram shows Ileana, grade 1DDF, severe AS (ORLY 0.7). Pt was made DNR/DNI but family is willing to pursue cardiac catheretization and TAVR so CODE status was changed at this time. (19 Apr 2021 15:01)      INTERIM EVENTS:      PMH -reviewed admission note, no change since admission  Heart Failure: Acute [ ] Chronic [ ] Acute on Chronic [ ] Diastolic [ ] Systolic [ ] Combined Systolic and Diastolic[ ]  JESSICA[ ]  ATN[ ]  CKD I [ ] CKDII [ ] CKD III [ ] CKD IV [ ] CKD V [ ] ESRD[ ]  HTN[ ] CVA[ ] DM[ ] COPD[ ] COVID[ ] AF[ ]  PPM[ ] ICD[ ]    MEDICATIONS  (STANDING):  amLODIPine   Tablet 5 milliGRAM(s) Oral daily  aspirin enteric coated 81 milliGRAM(s) Oral daily  atorvastatin 40 milliGRAM(s) Oral at bedtime  pantoprazole    Tablet 40 milliGRAM(s) Oral before breakfast  sodium chloride 0.9% lock flush 3 milliLiter(s) IV Push every 8 hours    MEDICATIONS  (PRN):            REVIEW OF SYSTEMS:  Constitutional: [ ] fever, [ ]weight loss,  [ ]fatigue  Eyes: [ ] visual changes  Respiratory: [ ]shortness of breath;  [ ] cough, [ ]wheezing, [ ]chills, [ ]hemoptysis  Cardiovascular: [ ] chest pain, [ ]palpitations, [ ]dizziness,  [ ]leg swelling[ ]orthopnea[ ]PND  Gastrointestinal: [ ] abdominal pain, [ ]nausea, [ ]vomiting,  [ ]diarrhea [ ]Constipation [ ]Melena  Genitourinary: [ ] dysuria, [ ] hematuria [ ]Aleman  Neurologic: [ ] headaches [ ] tremors[ ]weakness [ ]Paralysis Right[ ] Left[ ]  Skin: [ ] itching, [ ]burning, [ ] rashes  Endocrine: [ ] heat or cold intolerance  Musculoskeletal: [ ] joint pain or swelling; [ ] muscle, back, or extremity pain  Psychiatric: [ ] depression, [ ]anxiety, [ ]mood swings, or [ ]difficulty sleeping  Hematologic: [ ] easy bruising, [ ] bleeding gums    [x] All remaining systems negative except as per above.   [ ]Unable to obtain.    Vital Signs Last 24 Hrs  T(C): 37 (21 Apr 2021 11:28), Max: 37 (21 Apr 2021 11:28)  T(F): 98.6 (21 Apr 2021 11:28), Max: 98.6 (21 Apr 2021 11:28)  HR: 67 (21 Apr 2021 11:28) (62 - 67)  BP: 130/70 (21 Apr 2021 11:28) (115/70 - 142/72)  BP(mean): --  RR: 16 (21 Apr 2021 11:28) (16 - 18)  SpO2: 100% (21 Apr 2021 11:28) (98% - 100%)  I&O's Summary      PHYSICAL EXAM:  General: No acute distress BMI-  HEENT: EOMI, PERRL  Neck: Supple, [ ] JVD  Lungs: Equal air entry bilaterally; [ ] rales [ ] wheezing [ ] rhonchi  Heart: Regular rate and rhythm; [ ] murmur   /6 [ ] systolic [ ] diastolic [ ] radiation[ ] rubs [ ]  gallops  Abdomen: Nontender, bowel sounds present  Extremities: No clubbing, cyanosis, [ ] edema [ ]Pulses  equal and intact  Nervous system:  Alert & Oriented X3, no focal deficits  Psychiatric: Normal affect  Skin: No rashes or lesions    LABS:  04-21    135  |  103  |  17  ----------------------------<  93  3.2<L>   |  22  |  0.70    Ca    8.9      21 Apr 2021 04:33  Phos  2.5     04-21  Mg     2.2     04-21      Creatinine Trend: 0.70<--, 0.73<--, 0.79<--, 0.59<--, 0.83<--, 0.81<--                        14.2   10.26 )-----------( 219      ( 21 Apr 2021 04:33 )             40.7         Cardiac Enzymes:           RADIOLOGY:    ECG [my interpretation]:    TELEMETRY:Reviewed monitor tracings-    ECHO:    STRESS TEST:    CATHETERIZATION:      IMPRESSION AND PLAN:

## 2021-04-21 NOTE — PROVIDER CONTACT NOTE (OTHER) - ASSESSMENT
patient A&Ox2, on room air, patient asymptomatic, no chest pain or SOB noted. VS in flowsheet
patient is A&Ox2, on room air, no chest pain or SOB, VS as noted
Patient remains a&Ox1, sitting up in bed, watching tv. Denies any complaints at this time.

## 2021-04-21 NOTE — CHART NOTE - NSCHARTNOTEFT_GEN_A_CORE
Patient s/p PPM implant against left anterior chest wall.  Site with no hematoma or active bleed.  Patient without complaint. Will continue to monitor.      Vital Signs Last 24 Hrs  T(C): 36.8 (21 Apr 2021 19:45), Max: 37 (21 Apr 2021 11:28)  T(F): 98.2 (21 Apr 2021 19:45), Max: 98.6 (21 Apr 2021 11:28)  HR: 75 (21 Apr 2021 19:45) (62 - 75)  BP: 100/75 (21 Apr 2021 19:45) (100/75 - 130/70)  BP(mean): --  RR: 18 (21 Apr 2021 19:45) (16 - 18)  SpO2: 95% (21 Apr 2021 19:45) (95% - 100%)      Hyacinth Tong PA-C  Pager [phone]

## 2021-04-22 ENCOUNTER — TRANSCRIPTION ENCOUNTER (OUTPATIENT)
Age: 81
End: 2021-04-22

## 2021-04-22 VITALS
DIASTOLIC BLOOD PRESSURE: 83 MMHG | SYSTOLIC BLOOD PRESSURE: 128 MMHG | RESPIRATION RATE: 18 BRPM | HEART RATE: 91 BPM | TEMPERATURE: 98 F | OXYGEN SATURATION: 98 %

## 2021-04-22 PROBLEM — Z00.00 ENCOUNTER FOR PREVENTIVE HEALTH EXAMINATION: Status: ACTIVE | Noted: 2021-04-22

## 2021-04-22 LAB
ANION GAP SERPL CALC-SCNC: 11 MMOL/L — SIGNIFICANT CHANGE UP (ref 7–14)
BASOPHILS # BLD AUTO: 0.02 K/UL — SIGNIFICANT CHANGE UP (ref 0–0.2)
BASOPHILS NFR BLD AUTO: 0.2 % — SIGNIFICANT CHANGE UP (ref 0–2)
BUN SERPL-MCNC: 20 MG/DL — SIGNIFICANT CHANGE UP (ref 7–23)
CALCIUM SERPL-MCNC: 8.8 MG/DL — SIGNIFICANT CHANGE UP (ref 8.4–10.5)
CHLORIDE SERPL-SCNC: 103 MMOL/L — SIGNIFICANT CHANGE UP (ref 98–107)
CO2 SERPL-SCNC: 22 MMOL/L — SIGNIFICANT CHANGE UP (ref 22–31)
CREAT SERPL-MCNC: 0.71 MG/DL — SIGNIFICANT CHANGE UP (ref 0.5–1.3)
EOSINOPHIL # BLD AUTO: 0.12 K/UL — SIGNIFICANT CHANGE UP (ref 0–0.5)
EOSINOPHIL NFR BLD AUTO: 1.3 % — SIGNIFICANT CHANGE UP (ref 0–6)
GLUCOSE SERPL-MCNC: 87 MG/DL — SIGNIFICANT CHANGE UP (ref 70–99)
HCT VFR BLD CALC: 41.7 % — SIGNIFICANT CHANGE UP (ref 39–50)
HGB BLD-MCNC: 14.5 G/DL — SIGNIFICANT CHANGE UP (ref 13–17)
IANC: 6.3 K/UL — SIGNIFICANT CHANGE UP (ref 1.5–8.5)
IMM GRANULOCYTES NFR BLD AUTO: 0.5 % — SIGNIFICANT CHANGE UP (ref 0–1.5)
LYMPHOCYTES # BLD AUTO: 1.78 K/UL — SIGNIFICANT CHANGE UP (ref 1–3.3)
LYMPHOCYTES # BLD AUTO: 19.2 % — SIGNIFICANT CHANGE UP (ref 13–44)
MAGNESIUM SERPL-MCNC: 2.3 MG/DL — SIGNIFICANT CHANGE UP (ref 1.6–2.6)
MCHC RBC-ENTMCNC: 32.4 PG — SIGNIFICANT CHANGE UP (ref 27–34)
MCHC RBC-ENTMCNC: 34.8 GM/DL — SIGNIFICANT CHANGE UP (ref 32–36)
MCV RBC AUTO: 93.1 FL — SIGNIFICANT CHANGE UP (ref 80–100)
MONOCYTES # BLD AUTO: 0.99 K/UL — HIGH (ref 0–0.9)
MONOCYTES NFR BLD AUTO: 10.7 % — SIGNIFICANT CHANGE UP (ref 2–14)
NEUTROPHILS # BLD AUTO: 6.3 K/UL — SIGNIFICANT CHANGE UP (ref 1.8–7.4)
NEUTROPHILS NFR BLD AUTO: 68.1 % — SIGNIFICANT CHANGE UP (ref 43–77)
NRBC # BLD: 0 /100 WBCS — SIGNIFICANT CHANGE UP
NRBC # FLD: 0 K/UL — SIGNIFICANT CHANGE UP
PHOSPHATE SERPL-MCNC: 2.7 MG/DL — SIGNIFICANT CHANGE UP (ref 2.5–4.5)
PLATELET # BLD AUTO: 206 K/UL — SIGNIFICANT CHANGE UP (ref 150–400)
POTASSIUM SERPL-MCNC: 3.6 MMOL/L — SIGNIFICANT CHANGE UP (ref 3.5–5.3)
POTASSIUM SERPL-SCNC: 3.6 MMOL/L — SIGNIFICANT CHANGE UP (ref 3.5–5.3)
RBC # BLD: 4.48 M/UL — SIGNIFICANT CHANGE UP (ref 4.2–5.8)
RBC # FLD: 12.5 % — SIGNIFICANT CHANGE UP (ref 10.3–14.5)
SODIUM SERPL-SCNC: 136 MMOL/L — SIGNIFICANT CHANGE UP (ref 135–145)
WBC # BLD: 9.26 K/UL — SIGNIFICANT CHANGE UP (ref 3.8–10.5)
WBC # FLD AUTO: 9.26 K/UL — SIGNIFICANT CHANGE UP (ref 3.8–10.5)

## 2021-04-22 PROCEDURE — 99232 SBSQ HOSP IP/OBS MODERATE 35: CPT

## 2021-04-22 RX ORDER — ACETAMINOPHEN 500 MG
650 TABLET ORAL EVERY 6 HOURS
Refills: 0 | Status: DISCONTINUED | OUTPATIENT
Start: 2021-04-22 | End: 2021-04-22

## 2021-04-22 RX ORDER — ACETAMINOPHEN 500 MG
650 TABLET ORAL ONCE
Refills: 0 | Status: COMPLETED | OUTPATIENT
Start: 2021-04-22 | End: 2021-04-22

## 2021-04-22 RX ADMIN — Medication 650 MILLIGRAM(S): at 09:42

## 2021-04-22 RX ADMIN — Medication 650 MILLIGRAM(S): at 08:55

## 2021-04-22 RX ADMIN — AMLODIPINE BESYLATE 5 MILLIGRAM(S): 2.5 TABLET ORAL at 05:11

## 2021-04-22 RX ADMIN — PANTOPRAZOLE SODIUM 40 MILLIGRAM(S): 20 TABLET, DELAYED RELEASE ORAL at 05:11

## 2021-04-22 RX ADMIN — SODIUM CHLORIDE 3 MILLILITER(S): 9 INJECTION INTRAMUSCULAR; INTRAVENOUS; SUBCUTANEOUS at 10:02

## 2021-04-22 RX ADMIN — Medication 100 MILLIGRAM(S): at 00:07

## 2021-04-22 RX ADMIN — Medication 81 MILLIGRAM(S): at 11:39

## 2021-04-22 RX ADMIN — Medication 100 MILLIGRAM(S): at 08:11

## 2021-04-22 RX ADMIN — SODIUM CHLORIDE 3 MILLILITER(S): 9 INJECTION INTRAMUSCULAR; INTRAVENOUS; SUBCUTANEOUS at 05:11

## 2021-04-22 NOTE — PROGRESS NOTE ADULT - SUBJECTIVE AND OBJECTIVE BOX
DATE OF SERVICE: 04/22/2021 Patient was seen and examined ,interim events noted.Consultant notes ,Labs,Telemetry reviewed by me    PRESENTING CC:Chest pain    HPI and HOSPITAL COURSE: HPI:  81 y/o Male from home with PMH of Alzheimer's disease, HTN, arthritis and depression was [facility] due to chest pain and elevated BP.       INTERIM EVENTS:Awake had PPM placed no dyspnea chest pain       PMH -reviewed admission note, no change since admission  Heart Failure: Acute [ ] Chronic [ ] Acute on Chronic [ ] Diastolic [ ] Systolic [ ] Combined Systolic and Diastolic[ ]  JESSICA[ ]  ATN[ ]  CKD I [ ] CKDII [ ] CKD III [ ] CKD IV [ ] CKD V [ ] ESRD[ ]  HTN[ ] CVA[ ] DM[ ] COPD[ ] COVID[ ] AF[ ]  PPM[ ] ICD[ ]    MEDICATIONS  (STANDING):  amLODIPine   Tablet 5 milliGRAM(s) Oral daily  aspirin enteric coated 81 milliGRAM(s) Oral daily  atorvastatin 40 milliGRAM(s) Oral at bedtime  ceFAZolin   IVPB 1000 milliGRAM(s) IV Intermittent every 8 hours  pantoprazole    Tablet 40 milliGRAM(s) Oral before breakfast  sodium chloride 0.9% lock flush 3 milliLiter(s) IV Push every 8 hours              REVIEW OF SYSTEMS:  Constitutional: [ ] fever, [ ]weight loss,  [ ]fatigue  Eyes: [ ] visual changes  Respiratory: [ ]shortness of breath;  [ ] cough, [ ]wheezing, [ ]chills, [ ]hemoptysis  Cardiovascular: [ ] chest pain, [ ]palpitations, [ ]dizziness,  [ ]leg swelling[ ]orthopnea[ ]PND  Gastrointestinal: [ ] abdominal pain, [ ]nausea, [ ]vomiting,  [ ]diarrhea [ ]Constipation [ ]Melena  Genitourinary: [ ] dysuria, [ ] hematuria [ ]Aleman  Neurologic: [ ] headaches [ ] tremors[ ]weakness [ ]Paralysis Right[ ] Left[ ]  Skin: [ ] itching, [ ]burning, [ ] rashes  Endocrine: [ ] heat or cold intolerance  Musculoskeletal: [ ] joint pain or swelling; [ ] muscle, back, or extremity pain  Psychiatric: [ ] depression, [ ]anxiety, [ ]mood swings, or [ ]difficulty sleeping  Hematologic: [ ] easy bruising, [ ] bleeding gums    [x] All remaining systems negative except as per above.   [ ]Unable to obtain.    Vital Signs Last 24 Hrs  T(C): 36.8 (22 Apr 2021 05:10), Max: 37 (21 Apr 2021 11:28)  T(F): 98.3 (22 Apr 2021 05:10), Max: 98.6 (21 Apr 2021 11:28)  HR: 75 (22 Apr 2021 05:10) (67 - 75)  BP: 135/65 (22 Apr 2021 05:10) (100/75 - 135/65)  RR: 18 (22 Apr 2021 05:10) (16 - 18)  SpO2: 98% (22 Apr 2021 05:10) (95% - 100%)  I&O's Summary      PHYSICAL EXAM:  General: No acute distress BMI-24  HEENT: EOMI, PERRL  Neck: Supple, [ ] JVD  Lungs: Equal air entry bilaterally; [ ] rales [ ] wheezing [ ] rhonchi  Heart: Regular rate and rhythm; [x ] murmur   36 [x] systolic [ ] diastolic [x ] radiation[ ] rubs [ ]  gallops  Abdomen: Nontender, bowel sounds present  Extremities: No clubbing, cyanosis, [ ] edema [ ]Pulses  equal and intact  Nervous system:  Alert & Oriented X2, no focal deficits  Psychiatric: Normal affect  Skin: No rashes or lesions    LABS:  04-21    135  |  103  |  17  ----------------------------<  93  3.2<L>   |  22  |  0.70    Ca    8.9      21 Apr 2021 04:33  Phos  2.5     04-21  Mg     2.2     04-21      Creatinine Trend: 0.70<--, 0.73<--, 0.79<--, 0.59<--, 0.83<--, 0.81<--                        14.5   9.26  )-----------( 206      ( 22 Apr 2021 07:28 )             41.7         ECHO:Study Date: 4/15/2021  CONCLUSIONS:  1. Mild posterior mitral annular calcification. Trace mitral regurgitation.  2. Calcified trileaflet aortic valve with decreased opening. Severe aortic stenosis (ORLY 0.7 cm2 by continuity equation). Mild aortic regurgitation ( msec).  3. Normal aortic root.  4. Normal left atrium.  5. Moderate concentric left ventricular hypertrophy.  6. Normal Left Ventricular Systolic Function,  (EF = 62% by biplane). Peak left ventricular outflow tract gradient equals 24.4 mm Hg, consistent with mild dynamic LVOT obstruction.  7. Grade I diastolic dysfunction (Impaired relaxation).  8. Normal right atrium.  9. Normal right ventricular size and systolic function TAPSE 1.8 cm).  10. Normal tricuspid valve. Trace tricuspid regurgitation.  11. Normal pulmonic valve. Trace pulmonic insufficiency is noted.  12. No pericardial effusion.        IMPRESSION AND PLAN:  80 year old male with a PMH of HTN, Alzheimer's disease, arthritis, depression and recently diagnosed severe aortic stenosis who was transferred to [facility] prior to TAVR surgery. Left heart catheterization showed luminal coronary diseases and echo showed normal LVEF.  Telemetry demonstrated SR 50-60's with episodes of sinus pauses up to 3.8 seconds.  Baseline patient is oriented x1 only.        Problem/Plan - 1:  ·  Problem: Severe aortic stenosis.  Plan: - EKG NSR  - Troponin negative  - c/w Tele monitoring  - c/w aspirin, statin and lopressor  - Echocardiogram showed Ileana, grade 1DDF, severe AS (ORLY 0.7)  - Cath Luminal CAD  -For outpatient TAVR    Problem/Plan - 2:  ·  Problem: Sinus Node Dysfunction  -Hx Dizziness noted pauses on telemetry  -s/p PPM      Problem/Plan - 3:  ·  Problem: HTN (hypertension).  Plan: BP stable  - c/w amlodipine 5 mg od, lopressor 25 bid  - c/w atorvastatin 40 od  - monitor BPs.     Problem/Plan - 4:  ·  Problem: Alzheimer's disease.  Plan: - Patient not on medication  - sundowning-delirium-attempting to get OOB without assistance  - s/p Haldol 2mg IVP x 1  - enhanced supervision.     Problem/Plan - 5:  ·  Problem: Need for prophylactic measure.  Plan: - DVT PPX: Lovenox  - GI PPX: PPI.     Problem/Plan - 5:  ·  Problem: Goals of care, counseling/discussion.  Plan: DNR/DNI  MOLST in chart.

## 2021-04-22 NOTE — PROGRESS NOTE ADULT - ASSESSMENT
80 year old male with a PMH of HTN, Alzheimer's disease, arthritis, depression and recently diagnosed severe aortic stenosis who was transferred to [facility] from [facility] for [facility] prior to TAVR surgery. Left heart catheterization showed luminal coronary diseases and echo showed normal LVEF. Patient is found to have significant sinus node dysfunction evidenced by episodes of sinus pauses up to 3.8 seconds and one near syncopal event and dizziness for few months.  s/p PPM ([employer] MRI conditional)-  -May resume beta-blocker   -Completed post implant Abx for prophylaxis  - Post PPM teaching with instructions provided to patient and his daughter Nkechi over the phone.  Follow up on  5/6 at 2:30pm    -PPM interrogation to be performed by [employer] Rep today  -Continue TAVR workup plan per primary caridology

## 2021-04-22 NOTE — DISCHARGE NOTE NURSING/CASE MANAGEMENT/SOCIAL WORK - PATIENT PORTAL LINK FT
You can access the FollowMyHealth Patient Portal offered by [facility] by registering at the following website: [email or web address]. By joining [facility]’s FollowMyHealth portal, you will also be able to view your health information using other applications (apps) compatible with our system.

## 2021-04-22 NOTE — PROGRESS NOTE ADULT - ATTENDING COMMENTS
80 year old male with a PMH of HTN, Alzheimer's disease, arthritis, depression and recently diagnosed severe aortic stenosis who was transferred to [facility] from [facility] for [facility] prior to TAVR surgery. Left heart catheterization showed luminal coronary diseases and echo showed normal LVEF. Patient is found to have significant sinus node dysfunction evidenced by episodes of sinus pauses up to 3.8 seconds and one near syncopal event and dizziness for few months.  s/p PPM ([employer] MRI conditional)-Resume BB, will fu as outpt.

## 2021-04-22 NOTE — PROGRESS NOTE ADULT - SUBJECTIVE AND OBJECTIVE BOX
Patient is a 80y old  Male who presents with a chief complaint of Chest pain (22 Apr 2021 07:51)  c/o mild PPM incisional site pain.  Patient denies CP, SOB or palpitations.     PAST MEDICAL & SURGICAL HISTORY:  Alzheimer&#x27;s disease    HTN (hypertension)        MEDICATIONS  (STANDING):  acetaminophen   Tablet .. 650 milliGRAM(s) Oral once  amLODIPine   Tablet 5 milliGRAM(s) Oral daily  aspirin enteric coated 81 milliGRAM(s) Oral daily  atorvastatin 40 milliGRAM(s) Oral at bedtime  pantoprazole    Tablet 40 milliGRAM(s) Oral before breakfast  sodium chloride 0.9% lock flush 3 milliLiter(s) IV Push every 8 hours    MEDICATIONS  (PRN):  acetaminophen   Tablet .. 650 milliGRAM(s) Oral every 6 hours PRN Mild Pain (1 - 3), Moderate Pain (4 - 6)            Vital Signs Last 24 Hrs  T(C): 36.8 (22 Apr 2021 05:10), Max: 37 (21 Apr 2021 11:28)  T(F): 98.3 (22 Apr 2021 05:10), Max: 98.6 (21 Apr 2021 11:28)  HR: 75 (22 Apr 2021 05:10) (67 - 75)  BP: 135/65 (22 Apr 2021 05:10) (100/75 - 135/65)  BP(mean): --  RR: 18 (22 Apr 2021 05:10) (16 - 18)  SpO2: 98% (22 Apr 2021 05:10) (95% - 100%)            INTERPRETATION OF TELEMETRY:  SR with occasional A paced at 60's    ECG:        LABS:                        14.5   9.26  )-----------( 206      ( 22 Apr 2021 07:28 )             41.7     04-22    136  |  103  |  20  ----------------------------<  87  3.6   |  22  |  0.71    Ca    8.8      22 Apr 2021 07:28  Phos  2.7     04-22  Mg     2.3     04-22                BNP  RADIOLOGY & ADDITIONAL STUDIES:  FINDINGS:  S/P placement of left chest dual lead pacemaker with electrodes appropriately placed over the right heart.  The cardiomediastinal silhouette is within normal limits.  The lungs are clear. No pleural effusion or pneumothorax.    IMPRESSION:  S/P PPM placement.  No pneumothorax.              IMMANUEL MACIEL MD; Attending Radiologist  This document has been electronically signed. Apr 22 2021  8:26AM        PHYSICAL EXAM:    GENERAL: In no apparent distress, well nourished, and hydrated.  NECK: Supple and normal thyroid.  No JVD or carotid bruit.  Carotid pulse is 2+ bilaterally.  HEART: Regular rate and rhythm; 2/6 systolic  murmurs, no rubs, or gallops.  Left side ACW incision site with dermabond dry intact, no active bleeding or hematoma   PULMONARY: Clear to auscultation and perfusion.  No rales, wheezing, or rhonchi bilaterally.  ABDOMEN: Soft, Nontender, Nondistended; Bowel sounds present  EXTREMITIES:  2+ Peripheral Pulses, No clubbing, cyanosis, or edema  NEUROLOGICAL: Grossly nonfocal

## 2021-04-22 NOTE — PROGRESS NOTE ADULT - TIME BILLING
- Review of records, telemetry, vital signs and daily labs.   - General and cardiovascular physical examination.  - Generation of cardiovascular treatment plan.  - Coordination of care.    Patient was seen and examined by me on 04/22/2021,interim events noted,labs and radiology studies reviewed.  Darrell Cloud MD,FACC.  [address] [place], [place] [id number].  [phone]

## 2021-05-04 DIAGNOSIS — I35.0 NONRHEUMATIC AORTIC (VALVE) STENOSIS: ICD-10-CM

## 2021-05-04 DIAGNOSIS — G30.9 ALZHEIMER'S DISEASE, UNSPECIFIED: ICD-10-CM

## 2021-05-04 DIAGNOSIS — M19.90 UNSPECIFIED OSTEOARTHRITIS, UNSPECIFIED SITE: ICD-10-CM

## 2021-05-04 DIAGNOSIS — R07.9 CHEST PAIN, UNSPECIFIED: ICD-10-CM

## 2021-05-04 DIAGNOSIS — F32.9 MAJOR DEPRESSIVE DISORDER, SINGLE EPISODE, UNSPECIFIED: ICD-10-CM

## 2021-05-04 DIAGNOSIS — I10 ESSENTIAL (PRIMARY) HYPERTENSION: ICD-10-CM

## 2021-05-04 DIAGNOSIS — F02.80 ALZHEIMER'S DISEASE, UNSPECIFIED: ICD-10-CM

## 2021-05-04 RX ORDER — ASPIRIN 81 MG/1
81 TABLET, CHEWABLE ORAL
Refills: 0 | Status: ACTIVE | COMMUNITY

## 2021-05-04 RX ORDER — AMLODIPINE BESYLATE 5 MG/1
5 TABLET ORAL DAILY
Refills: 0 | Status: ACTIVE | COMMUNITY

## 2021-05-04 RX ORDER — METOPROLOL TARTRATE 25 MG/1
25 TABLET, FILM COATED ORAL TWICE DAILY
Refills: 0 | Status: ACTIVE | COMMUNITY

## 2021-05-07 ENCOUNTER — APPOINTMENT (OUTPATIENT)
Dept: ELECTROPHYSIOLOGY | Facility: CLINIC | Age: 81
End: 2021-05-07
Payer: MEDICARE

## 2021-05-07 VITALS — DIASTOLIC BLOOD PRESSURE: 66 MMHG | SYSTOLIC BLOOD PRESSURE: 124 MMHG | HEART RATE: 63 BPM

## 2021-05-07 PROCEDURE — 99024 POSTOP FOLLOW-UP VISIT: CPT

## 2021-05-12 ENCOUNTER — APPOINTMENT (OUTPATIENT)
Dept: CARDIOLOGY | Facility: CLINIC | Age: 81
End: 2021-05-12

## 2021-06-18 ENCOUNTER — APPOINTMENT (OUTPATIENT)
Dept: ELECTROPHYSIOLOGY | Facility: CLINIC | Age: 81
End: 2021-06-18

## 2021-07-19 NOTE — PHYSICAL THERAPY INITIAL EVALUATION ADULT - LEVEL OF INDEPENDENCE: SIT/STAND, REHAB EVAL
stand-by assist Cheilitis Normal Treatment: I recommended application of Vaseline or Aquaphor numerous times a day (as often as every hour) and before going to bed.

## 2021-07-21 ENCOUNTER — NON-APPOINTMENT (OUTPATIENT)
Age: 81
End: 2021-07-21

## 2021-07-21 ENCOUNTER — APPOINTMENT (OUTPATIENT)
Dept: ELECTROPHYSIOLOGY | Facility: CLINIC | Age: 81
End: 2021-07-21
Payer: MEDICARE

## 2021-07-21 PROCEDURE — 93296 REM INTERROG EVL PM/IDS: CPT

## 2021-07-21 PROCEDURE — 93294 REM INTERROG EVL PM/LDLS PM: CPT

## 2021-10-19 ENCOUNTER — NON-APPOINTMENT (OUTPATIENT)
Age: 81
End: 2021-10-19

## 2021-10-20 ENCOUNTER — APPOINTMENT (OUTPATIENT)
Dept: ELECTROPHYSIOLOGY | Facility: CLINIC | Age: 81
End: 2021-10-20
Payer: MEDICARE

## 2021-10-20 PROCEDURE — 93296 REM INTERROG EVL PM/IDS: CPT | Mod: NC

## 2021-10-20 PROCEDURE — 93294 REM INTERROG EVL PM/LDLS PM: CPT

## 2021-10-31 ENCOUNTER — INPATIENT (INPATIENT)
Facility: HOSPITAL | Age: 81
LOS: 8 days | Discharge: HOME | End: 2021-11-09
Attending: INTERNAL MEDICINE | Admitting: INTERNAL MEDICINE
Payer: MEDICARE

## 2021-10-31 VITALS
HEIGHT: 65 IN | DIASTOLIC BLOOD PRESSURE: 82 MMHG | SYSTOLIC BLOOD PRESSURE: 162 MMHG | HEART RATE: 95 BPM | TEMPERATURE: 98 F | RESPIRATION RATE: 18 BRPM

## 2021-10-31 DIAGNOSIS — I10 ESSENTIAL (PRIMARY) HYPERTENSION: ICD-10-CM

## 2021-10-31 DIAGNOSIS — Z71.89 OTHER SPECIFIED COUNSELING: ICD-10-CM

## 2021-10-31 DIAGNOSIS — R07.9 CHEST PAIN, UNSPECIFIED: ICD-10-CM

## 2021-10-31 DIAGNOSIS — R73.03 PREDIABETES: ICD-10-CM

## 2021-10-31 DIAGNOSIS — Z29.9 ENCOUNTER FOR PROPHYLACTIC MEASURES, UNSPECIFIED: ICD-10-CM

## 2021-10-31 DIAGNOSIS — Z95.0 PRESENCE OF CARDIAC PACEMAKER: Chronic | ICD-10-CM

## 2021-10-31 DIAGNOSIS — I35.0 NONRHEUMATIC AORTIC (VALVE) STENOSIS: ICD-10-CM

## 2021-10-31 LAB
ALBUMIN SERPL ELPH-MCNC: 4.5 G/DL — SIGNIFICANT CHANGE UP (ref 3.3–5)
ALP SERPL-CCNC: 61 U/L — SIGNIFICANT CHANGE UP (ref 40–120)
ALT FLD-CCNC: 20 U/L — SIGNIFICANT CHANGE UP (ref 4–41)
ANION GAP SERPL CALC-SCNC: 12 MMOL/L — SIGNIFICANT CHANGE UP (ref 7–14)
AST SERPL-CCNC: 19 U/L — SIGNIFICANT CHANGE UP (ref 4–40)
BASOPHILS # BLD AUTO: 0.03 K/UL — SIGNIFICANT CHANGE UP (ref 0–0.2)
BASOPHILS NFR BLD AUTO: 0.3 % — SIGNIFICANT CHANGE UP (ref 0–2)
BILIRUB SERPL-MCNC: 1.8 MG/DL — HIGH (ref 0.2–1.2)
BUN SERPL-MCNC: 16 MG/DL — SIGNIFICANT CHANGE UP (ref 7–23)
CALCIUM SERPL-MCNC: 9.7 MG/DL — SIGNIFICANT CHANGE UP (ref 8.4–10.5)
CHLORIDE SERPL-SCNC: 101 MMOL/L — SIGNIFICANT CHANGE UP (ref 98–107)
CO2 SERPL-SCNC: 26 MMOL/L — SIGNIFICANT CHANGE UP (ref 22–31)
CREAT SERPL-MCNC: 0.83 MG/DL — SIGNIFICANT CHANGE UP (ref 0.5–1.3)
EOSINOPHIL # BLD AUTO: 0.01 K/UL — SIGNIFICANT CHANGE UP (ref 0–0.5)
EOSINOPHIL NFR BLD AUTO: 0.1 % — SIGNIFICANT CHANGE UP (ref 0–6)
GLUCOSE SERPL-MCNC: 118 MG/DL — HIGH (ref 70–99)
HCT VFR BLD CALC: 40.7 % — SIGNIFICANT CHANGE UP (ref 39–50)
HGB BLD-MCNC: 14 G/DL — SIGNIFICANT CHANGE UP (ref 13–17)
IANC: 7.24 K/UL — SIGNIFICANT CHANGE UP (ref 1.5–8.5)
IMM GRANULOCYTES NFR BLD AUTO: 0.4 % — SIGNIFICANT CHANGE UP (ref 0–1.5)
LYMPHOCYTES # BLD AUTO: 1.13 K/UL — SIGNIFICANT CHANGE UP (ref 1–3.3)
LYMPHOCYTES # BLD AUTO: 11.8 % — LOW (ref 13–44)
MCHC RBC-ENTMCNC: 32.1 PG — SIGNIFICANT CHANGE UP (ref 27–34)
MCHC RBC-ENTMCNC: 34.4 GM/DL — SIGNIFICANT CHANGE UP (ref 32–36)
MCV RBC AUTO: 93.3 FL — SIGNIFICANT CHANGE UP (ref 80–100)
MONOCYTES # BLD AUTO: 1.15 K/UL — HIGH (ref 0–0.9)
MONOCYTES NFR BLD AUTO: 12 % — SIGNIFICANT CHANGE UP (ref 2–14)
NEUTROPHILS # BLD AUTO: 7.24 K/UL — SIGNIFICANT CHANGE UP (ref 1.8–7.4)
NEUTROPHILS NFR BLD AUTO: 75.4 % — SIGNIFICANT CHANGE UP (ref 43–77)
NRBC # BLD: 0 /100 WBCS — SIGNIFICANT CHANGE UP
NRBC # FLD: 0 K/UL — SIGNIFICANT CHANGE UP
PLATELET # BLD AUTO: 179 K/UL — SIGNIFICANT CHANGE UP (ref 150–400)
POTASSIUM SERPL-MCNC: 3.7 MMOL/L — SIGNIFICANT CHANGE UP (ref 3.5–5.3)
POTASSIUM SERPL-SCNC: 3.7 MMOL/L — SIGNIFICANT CHANGE UP (ref 3.5–5.3)
PROT SERPL-MCNC: 7.9 G/DL — SIGNIFICANT CHANGE UP (ref 6–8.3)
RBC # BLD: 4.36 M/UL — SIGNIFICANT CHANGE UP (ref 4.2–5.8)
RBC # FLD: 13.3 % — SIGNIFICANT CHANGE UP (ref 10.3–14.5)
SARS-COV-2 RNA SPEC QL NAA+PROBE: SIGNIFICANT CHANGE UP
SODIUM SERPL-SCNC: 139 MMOL/L — SIGNIFICANT CHANGE UP (ref 135–145)
TROPONIN T, HIGH SENSITIVITY RESULT: 11 NG/L — SIGNIFICANT CHANGE UP
TROPONIN T, HIGH SENSITIVITY RESULT: 13 NG/L — SIGNIFICANT CHANGE UP
WBC # BLD: 9.6 K/UL — SIGNIFICANT CHANGE UP (ref 3.8–10.5)
WBC # FLD AUTO: 9.6 K/UL — SIGNIFICANT CHANGE UP (ref 3.8–10.5)

## 2021-10-31 PROCEDURE — 99284 EMERGENCY DEPT VISIT MOD MDM: CPT | Mod: 25

## 2021-10-31 PROCEDURE — 71046 X-RAY EXAM CHEST 2 VIEWS: CPT | Mod: 26

## 2021-10-31 PROCEDURE — 93010 ELECTROCARDIOGRAM REPORT: CPT

## 2021-10-31 PROCEDURE — 99223 1ST HOSP IP/OBS HIGH 75: CPT

## 2021-10-31 RX ORDER — ASPIRIN/CALCIUM CARB/MAGNESIUM 324 MG
81 TABLET ORAL DAILY
Refills: 0 | Status: DISCONTINUED | OUTPATIENT
Start: 2021-11-01 | End: 2021-11-09

## 2021-10-31 RX ORDER — ACETAMINOPHEN 500 MG
650 TABLET ORAL EVERY 6 HOURS
Refills: 0 | Status: DISCONTINUED | OUTPATIENT
Start: 2021-10-31 | End: 2021-11-09

## 2021-10-31 RX ORDER — ASPIRIN/CALCIUM CARB/MAGNESIUM 324 MG
162 TABLET ORAL ONCE
Refills: 0 | Status: COMPLETED | OUTPATIENT
Start: 2021-10-31 | End: 2021-10-31

## 2021-10-31 RX ORDER — AMLODIPINE BESYLATE 2.5 MG/1
5 TABLET ORAL DAILY
Refills: 0 | Status: DISCONTINUED | OUTPATIENT
Start: 2021-10-31 | End: 2021-11-09

## 2021-10-31 RX ORDER — ENOXAPARIN SODIUM 100 MG/ML
40 INJECTION SUBCUTANEOUS DAILY
Refills: 0 | Status: DISCONTINUED | OUTPATIENT
Start: 2021-10-31 | End: 2021-11-04

## 2021-10-31 RX ORDER — ATORVASTATIN CALCIUM 80 MG/1
40 TABLET, FILM COATED ORAL AT BEDTIME
Refills: 0 | Status: DISCONTINUED | OUTPATIENT
Start: 2021-10-31 | End: 2021-11-03

## 2021-10-31 RX ORDER — METOPROLOL TARTRATE 50 MG
25 TABLET ORAL
Refills: 0 | Status: DISCONTINUED | OUTPATIENT
Start: 2021-10-31 | End: 2021-11-09

## 2021-10-31 RX ORDER — PANTOPRAZOLE SODIUM 20 MG/1
40 TABLET, DELAYED RELEASE ORAL
Refills: 0 | Status: DISCONTINUED | OUTPATIENT
Start: 2021-10-31 | End: 2021-11-09

## 2021-10-31 RX ADMIN — ATORVASTATIN CALCIUM 40 MILLIGRAM(S): 80 TABLET, FILM COATED ORAL at 23:28

## 2021-10-31 RX ADMIN — Medication 162 MILLIGRAM(S): at 16:52

## 2021-10-31 RX ADMIN — Medication 162 MILLIGRAM(S): at 20:21

## 2021-10-31 RX ADMIN — Medication 25 MILLIGRAM(S): at 23:28

## 2021-10-31 NOTE — H&P ADULT - PROBLEM SELECTOR PLAN 1
Chest pain x4 days intermittently, possibly exertional per grandson. Pressure-like per patient. Has hx of severe AS without intervention. Was supposed to get TAVR but patient is difficult to get to appts due to dementia  -monitor on tele  -trops flat  -EKG with TWI in V5 and V6 Chest pain x4 days intermittently, possibly exertional per grandson. Pressure-like per patient. Has hx of severe AS without intervention. Was supposed to get TAVR but patient is difficult to get to appts due to dementia. Possibly related to symptomatic AS  -monitor on tele  -trops flat  -EKG with TWI in V5 and V6 unchanged from EKG 4/19/2021  -TTE ordered  -would have GOC regarding TAVR as patient generally not amenable to medical appts and likely not interventions  -[facility] [place] 4/2021 with nonobstructive CAD. Rest of ischemic eval per primary cardiology team in AM  -add on probnp, TSH, lipids, A1C

## 2021-10-31 NOTE — ED PROVIDER NOTE - PHYSICAL EXAMINATION
Well appearing, well nourished, awake, alert, oriented to person, place, time/situation and in no apparent distress.    Airway patent    Eyes without scleral injection. No jaundice.    Strong pulse.    Respirations unlabored. Lungs clear.    Abdomen soft, non-tender, no guarding.    Spine appears normal, range of motion is not limited, no muscle or joint tenderness. No LE edema. Chest wall non-tender.    Alert and oriented, no gross motor or sensory deficits.    Skin: ~4 cm x 3 cm oval, flat red rash, non-itchy/non-tender at top of R chest area (1 wk), above area where chest pain is.    No SI/HI.

## 2021-10-31 NOTE — ED ADULT TRIAGE NOTE - CHIEF COMPLAINT QUOTE
Pt c/o mid-sternal chest pain upon waking up this am. Had pacemaker placement X 1 year ago. Appears uncomfortable in triage. Denies nausea, vomiting, fevers, chills. Phx: HTN, Alzheimer's     Rebecca Lynne- [phone]

## 2021-10-31 NOTE — H&P ADULT - NSHPPHYSICALEXAM_GEN_ALL_CORE
PHYSICAL EXAM:  Vital Signs Last 24 Hrs  T(C): 37 (10-31-21 @ 20:38)  T(F): 98.6 (10-31-21 @ 20:38), Max: 98.6 (10-31-21 @ 20:38)  HR: 90 (10-31-21 @ 20:38) (81 - 95)  BP: 112/60 (10-31-21 @ 20:38)  BP(mean): 67 (10-31-21 @ 20:38) (67 - 67)  RR: 19 (10-31-21 @ 20:38) (17 - 19)  SpO2: 100% (10-31-21 @ 20:38) (100% - 100%)  Wt(kg): --    Constitutional: NAD, awake and alert, well developed  EYES: EOMI, conjunctiva clear  ENT:  Normal Hearing, no tonsillar exudates   Neck: Soft and supple , no thyromegaly   Respiratory: Breath sounds are clear bilaterally, No wheezing, rales or rhonchi, no tachypnea, no accessory muscle use  Cardiovascular: S1 and S2, regular rate and rhythm, no Murmurs, gallops or rubs, no JVD, no leg edema  Gastrointestinal: Bowel Sounds present, soft, nontender, nondistended, no guarding, no rebound  Extremities: No cyanosis or clubbing; warm to touch  Vascular: 2+ peripheral pulses lower ex  Neurological: Oriented to name only. No focal deficits, CN II-XII intact bilaterally, sensation to light touch intact in all extremities. Pupils are equally reactive to light and symmetrical in size.   Musculoskeletal: 5/5 strength b/l upper and lower extremities; no joint swelling.  Skin: No rashes, no ulcerations PHYSICAL EXAM:  Vital Signs Last 24 Hrs  T(C): 37 (10-31-21 @ 20:38)  T(F): 98.6 (10-31-21 @ 20:38), Max: 98.6 (10-31-21 @ 20:38)  HR: 90 (10-31-21 @ 20:38) (81 - 95)  BP: 112/60 (10-31-21 @ 20:38)  BP(mean): 67 (10-31-21 @ 20:38) (67 - 67)  RR: 19 (10-31-21 @ 20:38) (17 - 19)  SpO2: 100% (10-31-21 @ 20:38) (100% - 100%)  Wt(kg): --    Constitutional: NAD, awake and alert, well developed  EYES: EOMI, conjunctiva clear  ENT:  Normal Hearing, no tonsillar exudates   Neck: Soft and supple , no thyromegaly   Respiratory: Breath sounds are clear bilaterally, No wheezing, rales or rhonchi, no tachypnea, no accessory muscle use  Cardiovascular: S1 and S2, regular rate and rhythm, loud systolic murmur, gallops or rubs, no JVD, no leg edema  Gastrointestinal: Bowel Sounds present, soft, nontender, nondistended, no guarding, no rebound  Extremities: No cyanosis or clubbing; warm to touch  Vascular: 2+ peripheral pulses lower ex  Neurological: Oriented to name only. No focal deficits, CN II-XII intact bilaterally, sensation to light touch intact in all extremities. Pupils are equally reactive to light and symmetrical in size.   Musculoskeletal: 5/5 strength b/l upper and lower extremities; no joint swelling.  Skin: No rashes, no ulcerations

## 2021-10-31 NOTE — H&P ADULT - HISTORY OF PRESENT ILLNESS
80M with PMHx Alzheimers dementia, sinus node dysfunction s/p PPM, HTN, pre-DM, severe AS, presenting with chest pain x4 days. Pain comes and goes for a few minutes at a time on both sides. He sometimes has pain radiating down to left arm. Pain is pressure, mild. At times, grandson believes chest pain can be related to exertion but per patient has at rest. Denies fevers, SOB, abdominal pain, vomiting, diarrhea day of admission. Patient was admitted in April and had a PPM placed for sinus node dysfunction. For severe AS patient was to get outpatient TAVR. [facility] with luminal disease. It has been difficult to get patient to go to the doctors.    Cardiologist is Dr. Jimenez    Home meds:  Metoprolol 25mg BID  Pantoprazole 40mg qd  Atorvastatin 40mg qhs  Amlodipine 5mg qd  ASA 81mg qd 80M with PMHx Alzheimers dementia, sinus node dysfunction s/p PPM, HTN, pre-DM, severe AS, presenting with chest pain x4 days. Patient is a limited historian 2/2 dementia and cannot recall why he was brought to the hospital and does not know he is in the hospital. Oriented only to name. He often answers questions tangentially as well. When asked pointed questions about chest pain he endorses mild, left sided chest pressure with radiation to left arm that self-resolved after 30 minutes earlier this morning. Collateral obtained by grandson Maged Bonner. Patient lives with grandson who primarily takes care of him. Patient has had 4 days of intermittent chest pain ranging from left to right side. Today he saw him clenching his chest and it was more persistent. He is unsure if related to exertion but believes it might be. He also had an episode of constipation followed by diarrhea two days ago but now resolved. No SOB, fevers, cough. Of note patient has been difficult to get to medical appts. He was supposed to get a TAVR as outpatient after his admission in April with PPM for sinus node dysfunction. However, patient has not been able to get done. 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 The grandson was told of open heart surgery needed but is unsure specifically what kind. Pt follows with Dr. Jimenez (cardiologist)    [facility] discussion:  On chart review, patient had a MOLST completed in April 2021 (on alpha in [place] visit history) by Kirsten Ruano (daughter) who was designated as surrogate due to patient's dementia and made patient DNR/DNI. Attempts to call daughter at number provided on MOLST were unsuccessful and unable to confirm if this are her wishes. According to the grandson, the daughter (who is his mother) is now estranged to the family and lives in [place]. She is currently hospitalized with COVID and unreachable. The granddaughter Reanna Bonner is not able to reach her at the same number provided in the chart as well and has been out of contact. The patient has other children but the grandchildren do not have their phone numbers and report that they are not involved in his life. They also haven't seen them for ~20 years. The patient currently lives with the grandson who has been primarily taking care of him since July. Patient has dementia and lacks capacity to make his own medical decisions. He does not explain back and is disoriented. He is tangential in his thought process. Both the grandson Maged and granddaughter Reanna would like to discuss GOC/code status between each other and will further inform providers of their wishes tomorrow. They believe his wishes were likely to be DNR/DNI    The daughter Kirsten who was noted to be a surrogate on a prior admission is now estranged to the family. The son believes the daughter (who is his mother) is in [place] hospitalized from COVID. Attempts to reach her with prior contact info were unsuccessful.      Home meds confirmed with granddaughter Reanna Ha  Metoprolol 25mg BID  Pantoprazole 40mg qd  Atorvastatin 40mg qhs  Amlodipine 5mg qd  ASA 81mg qd

## 2021-10-31 NOTE — H&P ADULT - NSICDXPASTMEDICALHX_GEN_ALL_CORE_FT
PAST MEDICAL HISTORY:  Alzheimer's disease     HTN (hypertension)     Prediabetes     Severe aortic stenosis

## 2021-10-31 NOTE — ED PROVIDER NOTE - ATTENDING CONTRIBUTION TO CARE
I performed a face-to-face evaluation of the patient and performed a history and physical examination. I agree with the history and physical examination.    Concern for ACS. Not likely PNA: no infectious Sx (eg, purulent cough). Not likely PE or other VTE: PERC or Wells low score, EKG no e/o R-heart strain. Check EKG, trop, CXR. If unremarkable, consider CDU.

## 2021-10-31 NOTE — ED ADULT NURSE NOTE - OBJECTIVE STATEMENT
Break coverage RN: Pt A&OX1 oriented to self ambulatory with a cane at baseline c/o R sided CP x 3 days. Hx Alzheimers, pacemaker, HTN. Pt poor historian. As per grandson at bedside, Pt c/o SOB and CP x 3 days and states pain was severe today. Pt verbalizing improvement in pain since arriving to ED. NAD noted. Resp even and unlabored. NSR on monitor. Pt denies active CP, SOB, HA, N/V/D, chills/fevers, pallor/diaphoresis. MD dumont pending. Awaiting further orders.
numerical 0-10

## 2021-10-31 NOTE — H&P ADULT - PROBLEM SELECTOR PLAN 5
See hpi for more detail  Surrogate was daughter Kirsten Ruano [phone] back in April 2021 who filled out MOLST as DNR/DNI which can be found in alpha in [place] visit. Unable to reach daughter to confirm. Per grandchildren she is in [place] hospitalized from COVID and estranged from family. They have not been able to reach her and do not have patient's other children's contact info  Unable to confirm code status with daughter and grandchildren who now primarily take care of him would like to discuss amongst themselves regarding this and will inform providers tomorrow. Maged Bonner [phone] and Reanna Bonner [phone]  -Palliative care consult for GOC/code status as complex family dynamic and unclear who would be primary decision maker at this point if cannot reach daughter and unclear severity of her own current condition. Unreachable other children.

## 2021-10-31 NOTE — ED ADULT NURSE NOTE - CHIEF COMPLAINT QUOTE
Pt c/o mid-sternal chest pain upon waking up this am. Had pacemaker placement X 1 year ago. Appears uncomfortable in triage. Denies nausea, vomiting, fevers, chills. Phx: HTN, Alzheimer's     Rebecca Lynne- [phone]

## 2021-10-31 NOTE — ED PROVIDER NOTE - OBJECTIVE STATEMENT
Asha: Pacermaker, Alzheimers, pre-DM, HTN. P/w chest pressure, general weakness, SOB. Son says he's holding the center of his chest. Not likely PNA: no infectious Sx (eg, purulent cough). Not likely PE or other VTE: PERC or Wells low score, EKG no e/o R-heart strain. No radiation. No significant dizziness/sweating/nausea/SOB. Asha: Pacermaker, Alzheimers, pre-DM, HTN. P/w R-sided and middle chest pressure x 4 days, general weakness, SOB. Son says he's holding the center of his chest. Not likely PNA: no infectious Sx (eg, purulent cough). Not likely PE or other VTE: PERC or Wells low score, EKG no e/o R-heart strain. No radiation. No significant dizziness/sweating/nausea/SOB. Has non-tender red rash on R chest, above area that bothers him.

## 2021-10-31 NOTE — H&P ADULT - ASSESSMENT
80M with PMHx Alzheimers dementia, sinus node dysfunction s/p PPM, HTN, pre-DM, severe AS, presenting with chest pain x4 days.

## 2021-10-31 NOTE — H&P ADULT - NSHPREVIEWOFSYSTEMS_GEN_ALL_CORE
ROS:    Constitutional: [ ] fevers [ ] chills   HEENT: [ ] dry eyes [ ] eye irritation [ ] postnasal drip   CV: [x ] chest pain [ ] orthopnea [ ] palpitations   Resp: [ ] cough [ ] shortness of breath [ ] dyspnea [ ] wheezing   GI: [ ] nausea [ ] vomiting [ ] diarrhea [ ] constipation [ ] abd pain   : [ ] dysuria [ ] nocturia [ ] hematuria [ ] increased urinary frequency  Musculoskeletal: [ ] back pain [ ] myalgias [ ] arthralgias  Skin: [ ] rash [ ] itch  Neurological: [ ] headache [ ] dizziness [ ] syncope   Endocrine: [ ] diabetes [ ] thyroid problem  Hematologic/Lymphatic: [ ] anemia [ ] bleeding problem  Allergic/Immunologic: [ ] itchy eyes [ ] nasal discharge   [x ] All other systems negative

## 2021-10-31 NOTE — ED PROVIDER NOTE - ADDITIONAL RISK FACTOR FREE TEXT BOX
Asha: Concern for ACS. Not likely PNA: no infectious Sx (eg, purulent cough). Not likely PE or other VTE: PERC or Wells low score, EKG no e/o R-heart strain. Check EKG, trop, CXR. If unremarkable, consider CDU.

## 2021-10-31 NOTE — H&P ADULT - NSHPLABSRESULTS_GEN_ALL_CORE
I have personally reviewed this patient's labs below:                        14.0   9.60  )-----------( 179      ( 31 Oct 2021 17:38 )             40.7     10-31-21 @ 17:38    139  |  101  |  16             --------------------------< 118<H>     3.7  |  26  | 0.83    eGFR AA: 98  eGFR N-AA: 84    Calcium: 9.7  Phosphorus: --  Magnesium: --    AST: 19    ALT: 20  AlkPhos: 61  Protein: 7.9  Albumin: 4.5  TBili: 1.8<H>  D-Bili: --    Troponin 11--13    I have personally reviewed this patient's EKG and my independent interpretation is NSR, TWI in V5 and V6    I have personally reviewed this patient's CXR and my independent interpretation is clear lungs      Review and summation of old records:  [facility] 4/2021:  CORONARY VESSELS: The coronary circulation is right dominant.  LM:   --  LM: Angiography showed mild atherosclerosis with no flow limiting  lesions.  LAD:   --  LAD: Angiography showed mild atherosclerosis with no flow  limiting lesions.  --  D1: Angiography showed mild atherosclerosis with no flow limiting  lesions.  CX:   --  OM1: There was a tubular 60 % stenosis at a site with no prior  intervention. There was ACOSTA grade 3 flow through the vessel (brisk flow).  RI:   --  Ramus intermedius: Angiography showed mild atherosclerosis with  no flow limiting lesions.  RCA:   --  RCA: Angiography showed mild atherosclerosis with no flow  limiting lesions.  --  Distal RCA: There was a discrete 20 % stenosis at a site with no prior  intervention. There was ACOSTA grade 3 flow through the vessel (brisk flow).  COMPLICATIONS: There were no complications.  DIAGNOSTIC IMPRESSIONS: Nonobstructive CAD    TTE 4/15/2021:  CONCLUSIONS:  1. Mild posterior mitral annular calcification. Trace  mitral regurgitation.  2. Calcified trileaflet aortic valve with decreased  opening. Severe aortic stenosis (ORLY 0.7 cm2 by continuity  equation). Mild aortic regurgitation ( msec).  3. Normal aortic root.  4. Normal left atrium.  5. Moderate concentric left ventricular hypertrophy.  6. Normal Left Ventricular Systolic Function,  (EF = 62% by  biplane). Peak left ventricular outflow tract gradient  equals 24.4 mm Hg, consistent with mild dynamic LVOT  obstruction.  7. Grade I diastolic dysfunction (Impaired relaxation).  8. Normal right atrium.  9. Normal right ventricular size and systolic function  (TAPSE 1.8 cm).  10. Normal tricuspid valve. Trace tricuspid regurgitation.  11. Normal pulmonic valve. Trace pulmonic insufficiency is  noted.  12. No pericardial effusion. I have personally reviewed this patient's labs below:                        14.0   9.60  )-----------( 179      ( 31 Oct 2021 17:38 )             40.7     10-31-21 @ 17:38    139  |  101  |  16             --------------------------< 118<H>     3.7  |  26  | 0.83    eGFR AA: 98  eGFR N-AA: 84    Calcium: 9.7  Phosphorus: --  Magnesium: --    AST: 19    ALT: 20  AlkPhos: 61  Protein: 7.9  Albumin: 4.5  TBili: 1.8<H>  D-Bili: --    Troponin 11--13    I have personally reviewed this patient's EKG and my independent interpretation is NSR, TWI in V5 and V6 also seen on prior EKG 4/19/2021    I have personally reviewed this patient's CXR and my independent interpretation is clear lungs      Review and summation of old records:  [facility] 4/2021:  CORONARY VESSELS: The coronary circulation is right dominant.  LM:   --  LM: Angiography showed mild atherosclerosis with no flow limiting  lesions.  LAD:   --  LAD: Angiography showed mild atherosclerosis with no flow  limiting lesions.  --  D1: Angiography showed mild atherosclerosis with no flow limiting  lesions.  CX:   --  OM1: There was a tubular 60 % stenosis at a site with no prior  intervention. There was ACOSTA grade 3 flow through the vessel (brisk flow).  RI:   --  Ramus intermedius: Angiography showed mild atherosclerosis with  no flow limiting lesions.  RCA:   --  RCA: Angiography showed mild atherosclerosis with no flow  limiting lesions.  --  Distal RCA: There was a discrete 20 % stenosis at a site with no prior  intervention. There was ACOSTA grade 3 flow through the vessel (brisk flow).  COMPLICATIONS: There were no complications.  DIAGNOSTIC IMPRESSIONS: Nonobstructive CAD    TTE 4/15/2021:  CONCLUSIONS:  1. Mild posterior mitral annular calcification. Trace  mitral regurgitation.  2. Calcified trileaflet aortic valve with decreased  opening. Severe aortic stenosis (ORLY 0.7 cm2 by continuity  equation). Mild aortic regurgitation ( msec).  3. Normal aortic root.  4. Normal left atrium.  5. Moderate concentric left ventricular hypertrophy.  6. Normal Left Ventricular Systolic Function,  (EF = 62% by  biplane). Peak left ventricular outflow tract gradient  equals 24.4 mm Hg, consistent with mild dynamic LVOT  obstruction.  7. Grade I diastolic dysfunction (Impaired relaxation).  8. Normal right atrium.  9. Normal right ventricular size and systolic function  (TAPSE 1.8 cm).  10. Normal tricuspid valve. Trace tricuspid regurgitation.  11. Normal pulmonic valve. Trace pulmonic insufficiency is  noted.  12. No pericardial effusion.

## 2021-10-31 NOTE — ED ADULT NURSE NOTE - INTERVENTIONS DEFINITIONS
[place] to call system/Call bell, personal items and telephone within reach/Instruct patient to call for assistance/Non-slip footwear when patient is off stretcher/Physically safe environment: no spills, clutter or unnecessary equipment/Stretcher in lowest position, wheels locked, appropriate side rails in place/Monitor gait and stability/Monitor for mental status changes and reorient to person, place, and time

## 2021-10-31 NOTE — ED PROVIDER NOTE - WR ORDER DATE AND TIME 1
-blood cultures obtained while pt experiencing chills/rigors  -changed abx to ceftriaxone 1g qd   31-Oct-2021 16:26

## 2021-10-31 NOTE — ED PROVIDER NOTE - CLINICAL SUMMARY MEDICAL DECISION MAKING FREE TEXT BOX
Asha: Asha: Concern for ACS. Not likely PNA: no infectious Sx (eg, purulent cough). Not likely PE or other VTE: PERC or Wells low score, EKG no e/o R-heart strain. Check EKG, trop, CXR. If unremarkable, consider CDU.

## 2021-10-31 NOTE — ED PROVIDER NOTE - PROGRESS NOTE DETAILS
Sky, DO PGY-3: spoke with Dr. Bee, discussed case and labs, initial trop 11 and pending repeat. EKG non-ischemic, will admit given age, hx of HTN, AS

## 2021-11-01 DIAGNOSIS — Z51.5 ENCOUNTER FOR PALLIATIVE CARE: ICD-10-CM

## 2021-11-01 DIAGNOSIS — F03.90 UNSPECIFIED DEMENTIA WITHOUT BEHAVIORAL DISTURBANCE: ICD-10-CM

## 2021-11-01 LAB
COVID-19 SPIKE DOMAIN AB INTERP: NEGATIVE — SIGNIFICANT CHANGE UP
COVID-19 SPIKE DOMAIN ANTIBODY RESULT: 0.4 U/ML — SIGNIFICANT CHANGE UP
SARS-COV-2 IGG+IGM SERPL QL IA: 0.4 U/ML — SIGNIFICANT CHANGE UP
SARS-COV-2 IGG+IGM SERPL QL IA: NEGATIVE — SIGNIFICANT CHANGE UP

## 2021-11-01 PROCEDURE — 99497 ADVNCD CARE PLAN 30 MIN: CPT | Mod: 25

## 2021-11-01 PROCEDURE — 99498 ADVNCD CARE PLAN ADDL 30 MIN: CPT | Mod: 25

## 2021-11-01 PROCEDURE — 99223 1ST HOSP IP/OBS HIGH 75: CPT | Mod: 25

## 2021-11-01 RX ORDER — HALOPERIDOL DECANOATE 100 MG/ML
1 INJECTION INTRAMUSCULAR ONCE
Refills: 0 | Status: DISCONTINUED | OUTPATIENT
Start: 2021-11-01 | End: 2021-11-09

## 2021-11-01 RX ORDER — HALOPERIDOL DECANOATE 100 MG/ML
1 INJECTION INTRAMUSCULAR ONCE
Refills: 0 | Status: COMPLETED | OUTPATIENT
Start: 2021-11-01 | End: 2021-11-01

## 2021-11-01 RX ADMIN — Medication 650 MILLIGRAM(S): at 17:54

## 2021-11-01 RX ADMIN — Medication 25 MILLIGRAM(S): at 18:08

## 2021-11-01 RX ADMIN — Medication 81 MILLIGRAM(S): at 11:43

## 2021-11-01 RX ADMIN — AMLODIPINE BESYLATE 5 MILLIGRAM(S): 2.5 TABLET ORAL at 11:44

## 2021-11-01 RX ADMIN — ENOXAPARIN SODIUM 40 MILLIGRAM(S): 100 INJECTION SUBCUTANEOUS at 11:44

## 2021-11-01 RX ADMIN — HALOPERIDOL DECANOATE 1 MILLIGRAM(S): 100 INJECTION INTRAMUSCULAR at 20:15

## 2021-11-01 RX ADMIN — PANTOPRAZOLE SODIUM 40 MILLIGRAM(S): 20 TABLET, DELAYED RELEASE ORAL at 05:38

## 2021-11-01 RX ADMIN — ATORVASTATIN CALCIUM 40 MILLIGRAM(S): 80 TABLET, FILM COATED ORAL at 22:21

## 2021-11-01 NOTE — CONSULT NOTE ADULT - CONVERSATION DETAILS
Palliative provider left voicemail void of PHI to Nkechi Ruano [phone]. Awaiting callback.     I then reached out to patient's granddaughter, Reanna Bonner ([phone]), who did  my phone call. Palliative provider introduced role to patient's granddaughter, Reanna Bonner, who was receptive and understanding of my call. She explained that patient has 2 children (son and daughter) who are both estranged from the family. She explained that she and her brother, Dean, have been caring for their grandpa at home. They hired a HHA for their grandpa, to help remind him when to take medications and when to eat meals while they are at work. Reanna shared that patient was diagnosed with dementia 10 years ago and his dementia has been progressive since 2019, after the loss of his wife. She explained that he has always been resistant to going to see doctors and even seeking medical care since his wife . Prior to hospitalization, patient c/o of chest pain x4 days and family decided to bring him to the hospital for evaluation. She states that she and Dean are aware that patient was suppose to f/u with cardio for TAVR in April. They are awaiting updates from cardio team at [facility] regarding further plan of care. They are agreeable to continuing with cardio workup for now and hearing treatment options.     Additionally, I did explain that patient's daughter, Nkechi, had previously had GOC discussion in 2021 at [facility]. At that time, patient was made a DNR/DNI and MOLST was completed. Reanna expressed understanding of CPR and intubation as she reports that she is BLS certified. She would like to speak to her brother Dean prior to making final decision on code status. Palliative team will f/u with grandchildren regarding goc.

## 2021-11-01 NOTE — CONSULT NOTE ADULT - PROBLEM SELECTOR RECOMMENDATION 4
Please see [facility] discussion attached. Patient's daughter (Nkechi) and son are estranged from family. He has been cared for by his grandchildren (Reanna and Dean) for the past few months.   I left voicemail void of PHI for Nkechi Ruano ([phone]), awaiting callback.   I spoke to patient's granddaughter, Reanna (see discussion above). She and her brother are aware that pt had previous MOLST form designating patient as DNR/DNI in the past (4/2021). Palliative team will f/u with them regarding re-instating this code status after they discuss amongst themselves.    Due to visitation restrictions in the hospital in light of COVID pandemic, all discussions with the patient/ patient's healthcare proxy or surrogate have been done via telehealth. This is to prevent spread of infection within the hospital and out in the community. Total time discussing advance care planning, goals of care discussions, and discussions about code status and hospice = 46 mins

## 2021-11-01 NOTE — CONSULT NOTE ADULT - ASSESSMENT
80M with PMHx Alzheimers dementia, sinus node dysfunction s/p PPM, HTN, pre-DM, severe AS, presenting with chest pain x4 days.    EKG: NSR lateral TWI    1. Chest pain  -trops 11-13  -[facility] in 4/2021 with non obstructive CAD   -echo pending    2. Aortic Stenosis  -hx of severe AS, planned for TAVR but patient did not follow up    3. HTN  -controlled  -c/w norvasc and metoprolol  -continue to monitor BP     4. DVT Prophylaxis   -lovenox subq 80M with PMHx Alzheimers dementia, sinus node dysfunction s/p PPM, HTN, pre-DM, severe AS, presenting with chest pain x4 days.    EKG: NSR lateral TWI    1. Chest pain  -trops 11-13  -[facility] in 4/2021 with non obstructive CAD   -echo pending    2. Aortic Stenosis  -hx of severe AS, planned for TAVR but patient did not follow up, has dementia not a candidate for TAVR    3. HTN  -controlled  -c/w norvasc and metoprolol  -continue to monitor BP     4. DVT Prophylaxis   -lovenox subq

## 2021-11-01 NOTE — PATIENT PROFILE ADULT - NSPROIMPLANTSMEDDEV_GEN_A_NUR
unable to assess d/t pt neurological status (A&Ox1) unable to assess d/t pt neurological status (A&Ox1)/Pacemaker

## 2021-11-01 NOTE — CONSULT NOTE ADULT - ASSESSMENT
80M with PMHx Alzheimers dementia, sinus node dysfunction s/p PPM, HTN, pre-DM, severe AS, presenting with chest pain x4 days.    Problem/Plan - 1:  ·  Problem: Chest pain.   ·  Plan:Possibly secondary toAS  -monitor on tele  check echo  Follow with Dr. Henry    Problem/Plan - 2:  ·  Problem: Severe aortic stenosis.   Possible , TAVR    Problem/Plan - 3:  ·  Problem: Prediabetes.   Monitor fingersticks    Problem/Plan - 4:  ·  Problem: HTN (hypertension).   Continue current medications    Suggest palliative input

## 2021-11-01 NOTE — CONSULT NOTE ADULT - ASSESSMENT
80M with PMHx Alzheimers dementia, sinus node dysfunction s/p PPM, HTN, pre-DM, severe AS, presenting with chest pain x4 days. Palliative team consulted for complex decision making regarding goc in setting of severe AS, admitted for further cardiac workup.

## 2021-11-01 NOTE — CONSULT NOTE ADULT - PROBLEM SELECTOR RECOMMENDATION 2
severe AS (TTE 4/15/21). At the time of my visit, patient denied pain but he is not a reliable historian due to dementia.   > f/u TTE  > This is patient's 2nd hospitalization at [facility] for chest pain (4/2021 at [facility], transferred to [facility] for cardiac cath). Patient was suppose to f/u as outpatient for TAVR but was non-compliant

## 2021-11-01 NOTE — CONSULT NOTE ADULT - PROBLEM SELECTOR RECOMMENDATION 9
CP likely related to severe AS (TTE 4/15/21). At the time of my visit, patient denied pain but he is not a reliable historian due to dementia.   > f/u TTE  > Family is awaiting to hear from cardio team regarding treatment options

## 2021-11-01 NOTE — CONSULT NOTE ADULT - PROBLEM SELECTOR RECOMMENDATION 3
Patient with worsening dementia (not on medications). Per granddaughter, his memory has been declining since 2019 (after the loss of his wife).   He has hired HHA to assist with ADLs  Patient lives with granddaughter and grandson who care for him when they are not working   Per chart review, he does have history of sundowning- delirium. Can consider starting haldol 0.5mg IVP x1 for agitation

## 2021-11-01 NOTE — CONSULT NOTE ADULT - PROBLEM SELECTOR RECOMMENDATION 5
Thank you for allowing us to participate in your patient's care. We will continue to follow with you. Please page [phone] for any q's or c's.     Kami Small D.O.   Palliative Medicine

## 2021-11-02 PROBLEM — R73.03 PREDIABETES: Chronic | Status: ACTIVE | Noted: 2021-10-31

## 2021-11-02 PROBLEM — I35.0 NONRHEUMATIC AORTIC (VALVE) STENOSIS: Chronic | Status: ACTIVE | Noted: 2021-10-31

## 2021-11-02 LAB
AMYLASE P1 CFR SERPL: 34 U/L — SIGNIFICANT CHANGE UP (ref 25–125)
ANION GAP SERPL CALC-SCNC: 13 MMOL/L — SIGNIFICANT CHANGE UP (ref 7–14)
APPEARANCE UR: CLEAR — SIGNIFICANT CHANGE UP
BILIRUB UR-MCNC: ABNORMAL
BUN SERPL-MCNC: 19 MG/DL — SIGNIFICANT CHANGE UP (ref 7–23)
CALCIUM SERPL-MCNC: 9.6 MG/DL — SIGNIFICANT CHANGE UP (ref 8.4–10.5)
CHLORIDE SERPL-SCNC: 102 MMOL/L — SIGNIFICANT CHANGE UP (ref 98–107)
CK MB BLD-MCNC: 1.1 % — SIGNIFICANT CHANGE UP (ref 0–2.5)
CK MB CFR SERPL CALC: 3 NG/ML — SIGNIFICANT CHANGE UP
CK SERPL-CCNC: 262 U/L — HIGH (ref 30–200)
CO2 SERPL-SCNC: 24 MMOL/L — SIGNIFICANT CHANGE UP (ref 22–31)
COLOR SPEC: ABNORMAL
CREAT SERPL-MCNC: 0.7 MG/DL — SIGNIFICANT CHANGE UP (ref 0.5–1.3)
DIFF PNL FLD: NEGATIVE — SIGNIFICANT CHANGE UP
GLUCOSE SERPL-MCNC: 182 MG/DL — HIGH (ref 70–99)
GLUCOSE UR QL: NEGATIVE — SIGNIFICANT CHANGE UP
HCT VFR BLD CALC: 40.6 % — SIGNIFICANT CHANGE UP (ref 39–50)
HGB BLD-MCNC: 13.8 G/DL — SIGNIFICANT CHANGE UP (ref 13–17)
KETONES UR-MCNC: NEGATIVE — SIGNIFICANT CHANGE UP
LEUKOCYTE ESTERASE UR-ACNC: ABNORMAL
LIDOCAIN IGE QN: 26 U/L — SIGNIFICANT CHANGE UP (ref 7–60)
MAGNESIUM SERPL-MCNC: 2.2 MG/DL — SIGNIFICANT CHANGE UP (ref 1.6–2.6)
MCHC RBC-ENTMCNC: 32.2 PG — SIGNIFICANT CHANGE UP (ref 27–34)
MCHC RBC-ENTMCNC: 34 GM/DL — SIGNIFICANT CHANGE UP (ref 32–36)
MCV RBC AUTO: 94.6 FL — SIGNIFICANT CHANGE UP (ref 80–100)
NITRITE UR-MCNC: NEGATIVE — SIGNIFICANT CHANGE UP
NRBC # BLD: 0 /100 WBCS — SIGNIFICANT CHANGE UP
NRBC # FLD: 0 K/UL — SIGNIFICANT CHANGE UP
PH UR: 7 — SIGNIFICANT CHANGE UP (ref 5–8)
PHOSPHATE SERPL-MCNC: 1.4 MG/DL — LOW (ref 2.5–4.5)
PLATELET # BLD AUTO: 171 K/UL — SIGNIFICANT CHANGE UP (ref 150–400)
POTASSIUM SERPL-MCNC: 3.6 MMOL/L — SIGNIFICANT CHANGE UP (ref 3.5–5.3)
POTASSIUM SERPL-SCNC: 3.6 MMOL/L — SIGNIFICANT CHANGE UP (ref 3.5–5.3)
PROT UR-MCNC: ABNORMAL
RBC # BLD: 4.29 M/UL — SIGNIFICANT CHANGE UP (ref 4.2–5.8)
RBC # FLD: 13.3 % — SIGNIFICANT CHANGE UP (ref 10.3–14.5)
SODIUM SERPL-SCNC: 139 MMOL/L — SIGNIFICANT CHANGE UP (ref 135–145)
SP GR SPEC: 1.05 — SIGNIFICANT CHANGE UP (ref 1–1.05)
TROPONIN T, HIGH SENSITIVITY RESULT: 13 NG/L — SIGNIFICANT CHANGE UP
UROBILINOGEN FLD QL: ABNORMAL
WBC # BLD: 10.43 K/UL — SIGNIFICANT CHANGE UP (ref 3.8–10.5)
WBC # FLD AUTO: 10.43 K/UL — SIGNIFICANT CHANGE UP (ref 3.8–10.5)

## 2021-11-02 PROCEDURE — 99358 PROLONG SERVICE W/O CONTACT: CPT

## 2021-11-02 PROCEDURE — 71045 X-RAY EXAM CHEST 1 VIEW: CPT | Mod: 26

## 2021-11-02 PROCEDURE — 76705 ECHO EXAM OF ABDOMEN: CPT | Mod: 26

## 2021-11-02 PROCEDURE — 93010 ELECTROCARDIOGRAM REPORT: CPT

## 2021-11-02 PROCEDURE — 74018 RADEX ABDOMEN 1 VIEW: CPT | Mod: 26

## 2021-11-02 PROCEDURE — 74177 CT ABD & PELVIS W/CONTRAST: CPT | Mod: 26

## 2021-11-02 RX ORDER — SENNA PLUS 8.6 MG/1
2 TABLET ORAL AT BEDTIME
Refills: 0 | Status: DISCONTINUED | OUTPATIENT
Start: 2021-11-02 | End: 2021-11-09

## 2021-11-02 RX ORDER — PIPERACILLIN AND TAZOBACTAM 4; .5 G/20ML; G/20ML
3.38 INJECTION, POWDER, LYOPHILIZED, FOR SOLUTION INTRAVENOUS ONCE
Refills: 0 | Status: COMPLETED | OUTPATIENT
Start: 2021-11-02 | End: 2021-11-02

## 2021-11-02 RX ORDER — POLYETHYLENE GLYCOL 3350 17 G/17G
17 POWDER, FOR SOLUTION ORAL DAILY
Refills: 0 | Status: DISCONTINUED | OUTPATIENT
Start: 2021-11-02 | End: 2021-11-09

## 2021-11-02 RX ORDER — PIPERACILLIN AND TAZOBACTAM 4; .5 G/20ML; G/20ML
3.38 INJECTION, POWDER, LYOPHILIZED, FOR SOLUTION INTRAVENOUS EVERY 8 HOURS
Refills: 0 | Status: DISCONTINUED | OUTPATIENT
Start: 2021-11-02 | End: 2021-11-09

## 2021-11-02 RX ORDER — SODIUM,POTASSIUM PHOSPHATES 278-250MG
1 POWDER IN PACKET (EA) ORAL ONCE
Refills: 0 | Status: COMPLETED | OUTPATIENT
Start: 2021-11-02 | End: 2021-11-02

## 2021-11-02 RX ORDER — ONDANSETRON 8 MG/1
4 TABLET, FILM COATED ORAL THREE TIMES A DAY
Refills: 0 | Status: DISCONTINUED | OUTPATIENT
Start: 2021-11-02 | End: 2021-11-09

## 2021-11-02 RX ADMIN — Medication 25 MILLIGRAM(S): at 14:23

## 2021-11-02 RX ADMIN — Medication 650 MILLIGRAM(S): at 17:24

## 2021-11-02 RX ADMIN — Medication 30 MILLILITER(S): at 06:57

## 2021-11-02 RX ADMIN — ONDANSETRON 4 MILLIGRAM(S): 8 TABLET, FILM COATED ORAL at 18:40

## 2021-11-02 RX ADMIN — ATORVASTATIN CALCIUM 40 MILLIGRAM(S): 80 TABLET, FILM COATED ORAL at 21:32

## 2021-11-02 RX ADMIN — AMLODIPINE BESYLATE 5 MILLIGRAM(S): 2.5 TABLET ORAL at 14:23

## 2021-11-02 RX ADMIN — Medication 1 PACKET(S): at 14:23

## 2021-11-02 RX ADMIN — PANTOPRAZOLE SODIUM 40 MILLIGRAM(S): 20 TABLET, DELAYED RELEASE ORAL at 05:42

## 2021-11-02 RX ADMIN — SENNA PLUS 2 TABLET(S): 8.6 TABLET ORAL at 21:32

## 2021-11-02 RX ADMIN — PIPERACILLIN AND TAZOBACTAM 200 GRAM(S): 4; .5 INJECTION, POWDER, LYOPHILIZED, FOR SOLUTION INTRAVENOUS at 18:39

## 2021-11-02 RX ADMIN — POLYETHYLENE GLYCOL 3350 17 GRAM(S): 17 POWDER, FOR SOLUTION ORAL at 21:33

## 2021-11-02 RX ADMIN — ENOXAPARIN SODIUM 40 MILLIGRAM(S): 100 INJECTION SUBCUTANEOUS at 14:23

## 2021-11-02 RX ADMIN — Medication 81 MILLIGRAM(S): at 14:23

## 2021-11-02 NOTE — CHART NOTE - NSCHARTNOTEFT_GEN_A_CORE
[facility] NIGHT MEDICINE COVERAGE    Notified by RN pt is c/o chest pain, STAT EKG ordered w/ cardiac enzymes.  Pt assessed at bedside, he is alert to name only but able to answer questions about his pain.  He is pointing to his epigastric region and endorses significant pain, unable to describe, denies any N/V/D, headache, dizziness, or back pain.    ICU Vital Signs Last 24 Hrs  T(C): 36.7 (02 Nov 2021 05:37), Max: 36.8 (01 Nov 2021 11:40)  T(F): 98.1 (02 Nov 2021 05:37), Max: 98.2 (01 Nov 2021 11:40)  HR: 72 (02 Nov 2021 05:37) (61 - 81)  BP: 106/52 (02 Nov 2021 05:37) (106/52 - 130/68)  RR: 18 (02 Nov 2021 05:37) (17 - 18)  SpO2: 99% (02 Nov 2021 05:37) (99% - 100%)    PE:  GS: NAD, A&Ox1  Lungs: CTA b/l, w/o wheezes, rales, or rhonchi  Heart: RRR, +S1S2, w/o murmur, rub, or gallop  Abd: Soft, ND, +BS over all 4 quadrants, tender in epigastrum and RUQ to palpation, w/o rebound or guarding  Neuro: A&Ox1, moving all 4 extremities spontaneously    Assessment:  Epigastric and RUQ pain, r/o ACS vs. GI issue    Plan:  -EKG is Sinus Rhythm w/ PACs.  Previous EKG has TWIs in V5 and V6 that were chronic, these TWIs are not present on this new EKG - no other TWIs or ST segment changes are noted.  -Repeat cardiac enzymes pending  -Check Amylase and Lipase  -Abd U/S ordered  -Trial of Maalox  -C/w telemetry  -Pt stable at this time    D/w day team provider.  Will continue to monitor.    Shyam Culp PA-C  Department of Medicine - [facility]  In-House Pager: #[phone]

## 2021-11-02 NOTE — PROGRESS NOTE ADULT - ASSESSMENT
80M with PMHx Alzheimers dementia, sinus node dysfunction s/p PPM, HTN, pre-DM, severe AS, presenting with chest pain x4 days.    EKG: NSR lateral TWI    1. Chest pain  -trops 11-13  -[facility] in 4/2021 with non obstructive CAD   -echo pending  -patient with pain in RUQ, ABD US shows Mildly distended gallbladder with gallbladder sludge    2. Aortic Stenosis  -hx of severe AS, planned for TAVR but patient did not follow up, has dementia not a candidate for TAVR    3. HTN  -controlled  -c/w norvasc and metoprolol  -continue to monitor BP     4. DVT Prophylaxis   -lovenox subq   80M with PMHx Alzheimers dementia, sinus node dysfunction s/p PPM, HTN, pre-DM, severe AS, presenting with chest pain x4 days.    EKG: NSR lateral TWI    1. Chest pain  -trops 11-13  -[facility] [place] in 4/2021 with non obstructive CAD   -echo pending  -patient with pain in RUQ, ABD US shows Mildly distended gallbladder with gallbladder sludge. GI consulted    2. Aortic Stenosis  -hx of severe AS, planned for TAVR but patient did not follow up, has dementia not a candidate for TAVR    3. HTN  -controlled  -c/w norvasc and metoprolol  -continue to monitor BP     4. DVT Prophylaxis   -lovenox subq

## 2021-11-02 NOTE — CONSULT NOTE ADULT - ASSESSMENT
80M with PMHx Alzheimers dementia, sinus node dysfunction s/p PPM, HTN, pre-DM, severe AS, presenting with chest pain and noted with increased liver enzymes     Increased Liver Enzymes   r/o CBD Stone vs Acute Stacia vs Hepatitis   trend LFTs daily  US reviewed; MRCP ordered   hepatitis ordered/pending  Protonix daily   pain control prn   further recs pending MRCP     Chest Pain   cardiology work up appreciated    I reviewed the overnight course of events on the unit, re-confirming the patient history. I discussed the care with the patient and their family. The plan of care was discussed with the physician assistant and modifications were made to the notation where appropriate. Differential diagnosis and plan of care discussed with patient after the evaluation. Advanced care planning was discussed with patient and family.  Advanced care planning forms were reviewed and discussed.  Risks, benefits and alternatives of gastroenterologic procedures were discussed in detail and all questions were answered. 35 minutes spent on total encounter of which more than fifty percent of the encounter was spent counseling and/or coordinating care by the attending physician.

## 2021-11-02 NOTE — GOALS OF CARE CONVERSATION - ADVANCED CARE PLANNING - CONVERSATION DETAILS
MOLST filled out with Reanna Bonner [phone], primary decision maker and Maged Bonner.     Pt made DNR/DNI    Discussed with attending Dr. Bee and palliative.     Akilah Degroot PA-C  Department of Medicine  Pager [phone]

## 2021-11-02 NOTE — PROGRESS NOTE ADULT - ASSESSMENT
80M with PMHx Alzheimers dementia, sinus node dysfunction s/p PPM, HTN, pre-DM, severe AS, presenting with chest pain x4 days.    Problem/Plan - 1:  ·  Problem: Chest pain.   ·  Plan:Possibly secondary toAS  -monitor on tele  check echo  Follow with Dr. Henry    Problem/Plan - 2:  ·  Problem: Severe aortic stenosis.   Possible , TAVR    Problem/Plan - 3:  ·  Problem: Prediabetes.   Monitor fingersticks    Problem/Plan - 4:  ·  Problem: HTN (hypertension).   Continue current medications    Elevated LFTs: Appreciate GI input  Workup underway  Avoid hepatotoxic medication  sWould Discontinue Lipitor      Fever: Continue empiric antibiotics with Zosyn.Check urinalysis and culture.  Check blood cultures.  Ultrasound of abdomen noted cholecystitis.  If no definitive source for fever, would check CT chest abdomen and pelvis.  Will check RVP  Suggest palliative input

## 2021-11-02 NOTE — CONSULT NOTE ADULT - ATTENDING COMMENTS
Pt seen and examined.  Agree with evaluation and plan. No clinical evidence of cholecystitis.  Abdomen nontender and denies pain.  No surgical issues.  Reconsult PRN.
I reviewed the overnight course of events on the unit, re-confirming the patient history. I discussed the care with the patient and their family. The plan of care was discussed with the ACP team and modifications were made to the notation where appropriate. Differential diagnosis and plan of care discussed with patient after the evaluation. Advanced care planning was discussed with patient and family.  Advanced care planning forms were reviewed and discussed.  Risks, benefits and alternatives of cardiac procedures were discussed in detail and all questions were answered. 35 minutes spent on total encounter of which more than fifty percent of the encounter was spent counseling and/or coordinating care by the attending physician.

## 2021-11-02 NOTE — CONSULT NOTE ADULT - ASSESSMENT
81yo M w/ hx Alzheimer's dementia (a&ox1), sinus node dysfunction s/p PPM, HTN, pre-DM, severe AS (was pending TAVR in April), now presenting with chest pain x4 days. Pt limited historian, however he has a benign abdominal exam, tolerating PO. Imaging with no stones, mild gb distension and sludge.     - Pt without evidence of acute cholecystitis. No acute surgical intervention indicated.  - F/u GI recs, MRCP   - Trend LFT's   - F/u infectious workup  - Pt poor surgical candidate given comorbidities  If continued suspicion for acute cholecystitis, rec HIDA and IR consult for possible percutaneous cholecystostomy tube    - Discussed with Dr. Tank Friedman PGY3  B Team Surgery   [id number] Patient

## 2021-11-02 NOTE — CHART NOTE - NSCHARTNOTEFT_GEN_A_CORE
************************************************************************  PALLIATIVE MEDICINE COORDINATION OF CARE NOTE FOR LEOPOLDO RAMOS  [x] Inpatient Consult  [ ] Other:  ************************************************************************  SUMMARY OF RECOMMENDATIONS:    > Chart reviewed. Discussed with IDT.   > SYMPTOM MANAGEMENT: primary team, patient does not endorse pain when I visit him   > GOALS OF CARE: Discussed with ACP Akilah who spoke with patient's grandchildren (Reanna and Dean) who agreed to DNR/DNI. Left voicemail for daughterNkechi, yesterday. Have not received a callback.   > CODE STATUS: DNR/DNI    > HCP/ SURROGATE: Reached out to patient's daughterNkechi, yesterday. No callback. Patient's grandchildren, Reanna and Dean, have been patient's caregivers for the past 7 months as Nkechi has become estranged from the family since patient's last admission. Reanna and Dean are patient's only reachable family members who are currently involved in patient's life. Would defer decision making to Reanna and Dean as patient is pleasantly demented and does not have capacity to make decisions.   > He is currently being worked up for transaminitis by GI. TTE pending to evaluate AS. If he is no longer a candidate for TAVR, he may be appropriate for hospice services. We will continue to follow with you.     ************************************************************************  CHART REVIEW:  > Overnight events noted. Patient seen this morning, resting, appeared to be in NAD.     HPI:  80M with PMHx Alzheimers dementia, sinus node dysfunction s/p PPM, HTN, pre-DM, severe AS, presenting with chest pain x4 days. Patient is a limited historian 2/2 dementia and cannot recall why he was brought to the hospital and does not know he is in the hospital. Oriented only to name. He often answers questions tangentially as well. When asked pointed questions about chest pain he endorses mild, left sided chest pressure with radiation to left arm that self-resolved after 30 minutes earlier this morning. Collateral obtained by grandson Magedchris Bonner. Patient lives with grandson who primarily takes care of him. Patient has had 4 days of intermittent chest pain ranging from left to right side. Today he saw him clenching his chest and it was more persistent. He is unsure if related to exertion but believes it might be. He also had an episode of constipation followed by diarrhea two days ago but now resolved. No SOB, fevers, cough. Of note patient has been difficult to get to medical appts. He was supposed to get a TAVR as outpatient after his admission in April with PPM for sinus node dysfunction. However, patient has not been able to get done. 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 The grandson was told of open heart surgery needed but is unsure specifically what kind. Pt follows with Dr. Jimenez (cardiologist)    [facility] discussion:  On chart review, patient had a MOLST completed in April 2021 (on alpha in [facility] visit history) by Kirsten Ruano (daughter) who was designated as surrogate due to patient's dementia and made patient DNR/DNI. Attempts to call daughter at number provided on MOLST were unsuccessful and unable to confirm if this are her wishes. According to the grandson, the daughter (who is his mother) is now estranged to the family and lives in [place]. She is currently hospitalized with COVID and unreachable. The granddaughter Reanna Bonner is not able to reach her at the same number provided in the chart as well and has been out of contact. The patient has other children but the grandchildren do not have their phone numbers and report that they are not involved in his life. They also haven't seen them for ~20 years. The patient currently lives with the grandson who has been primarily taking care of him since July. Patient has dementia and lacks capacity to make his own medical decisions. He does not explain back and is disoriented. He is tangential in his thought process. Both the grandson Maged and granddaughter Reanna would like to discuss GOC/code status between each other and will further inform providers of their wishes tomorrow. They believe his wishes were likely to be DNR/DNI    The daughter Kirsten who was noted to be a surrogate on a prior admission is now estranged to the family. The son believes the daughter (who is his mother) is in [place] hospitalized from COVID. Attempts to reach her with prior contact info were unsuccessful.      Home meds confirmed with granddaughter Reanna Bonner  Metoprolol 25mg BID  Pantoprazole 40mg qd  Atorvastatin 40mg qhs  Amlodipine 5mg qd  ASA 81mg qd (31 Oct 2021 21:46)      MEDICATIONS  (STANDING):  amLODIPine   Tablet 5 milliGRAM(s) Oral daily  aspirin  chewable 81 milliGRAM(s) Oral daily  atorvastatin 40 milliGRAM(s) Oral at bedtime  enoxaparin Injectable 40 milliGRAM(s) SubCutaneous daily  haloperidol    Injectable 1 milliGRAM(s) IV Push once  metoprolol tartrate 25 milliGRAM(s) Oral two times a day  pantoprazole    Tablet 40 milliGRAM(s) Oral before breakfast  piperacillin/tazobactam IVPB. 3.375 Gram(s) IV Intermittent once  piperacillin/tazobactam IVPB.. 3.375 Gram(s) IV Intermittent every 8 hours  polyethylene glycol 3350 17 Gram(s) Oral daily  senna 2 Tablet(s) Oral at bedtime    MEDICATIONS  (PRN):  acetaminophen     Tablet .. 650 milliGRAM(s) Oral every 6 hours PRN Temp greater or equal to 38C (100.4F), Mild Pain (1 - 3)  ondansetron Injectable 4 milliGRAM(s) IV Push three times a day PRN Nausea and/or Vomiting          ************************************************************************  MEDICATION REVIEW:  --- Pls refer to current medicatons in the body of this note  --- PRN usage: n/a   ------------------------------------------------------------------------  COORDINATION OF CARE:  --- Palliative Care consulted for: [facility]   --- Patient assessed:  yes   --- Patient previously seen by Palliative Care service: NO  ADVANCE CARE PLANNING  --- Code status: DNR/DNI    --- MOLST reviewed in chart: Completed by Primary medical team   --- HCP/ Surrogate: Grandhaider, Shauna [phone], Reanna [phone]  --- [facility] document found in Alpha: NONE  --- HCP/ Living will/ Other advanced directives in Alpha: NONE  CARE PROVIDER DOCUMENTATION:  --- SW/CM notes: n/a   --- PT recs: n/a   --- Sp/Sw recs: DASH/TLC   --- Nutrition recs: n/a   PLAN OF CARE  --- Known admissions in past year: 1 (4/13/21)    --- Current admit date: 10/31/2021   --- LOS: 2  --- LACE score: 10  --- Current dispo plan: TO BE DETERMINED. Patient being worked up by GI for elevated LFTs   ------------------------------------------------------------------------  --- Chart reviewed: 30 Minutes [including time used to gather, review and transfer data to this note]  --- Start: 6pm  --- End: 6:30pm   Prolonged services rendered, as part of this patient's care provided by Palliative Medicine, include: i.chart review for provider and ancillary service documentation, ii.pertinent diagnostics including laboratory and imaging studies,iii. medication review including PRN use, iv. admission history including previous palliative care encounters and GOC notes, v.advance care planning documents including HCP and MOLST forms in Alpha. Part of Palliative Medicine extended evaluation and management also involves coordination of care with our IDT, the primary and consulting [place], and unit CM/SW and Hospice if eligible. Recommendations based on the information gathered and discussed are outline in the AP of our notes.    ************************************************************************  Care coordination of LEOPOLDO RAMOS was communicated with the interdisciplinary team during IDT rounds and with the primary team.

## 2021-11-02 NOTE — CHART NOTE - NSCHARTNOTEFT_GEN_A_CORE
Called by RN, pt with rectal temp 102. Tylenol to be given.   Pt without active complaints at present.     Ordered ua, u cx, cxr, axr, blood cx  x2.   Discussed with GI, recommended to start zosyn and CT You need to take your medications as prescribed every day to prevent the stents in your coronary arteries from becoming blocked (aspirin and plavix). Please follow up with your cardiologist and PMD regarding these medications. with IV contrast and surgery eval  Discussed with attending, Dr. Bee    Will continue to monitor     Akilah Degroot PA-C  Department of Medicine  Pager [phone] Called by RN, pt with rectal temp 102. Tylenol to be given.   Pt without active complaints at present.     Ordered ua, u cx, cxr, axr, blood cx  x2.   Discussed with GI, recommended to start zosyn and CT Ab/pelvis with IV contrast and surgery eval  Discussed with attending, Dr. Bee    Will continue to monitor     Akilah Degroot PA-C  Department of Medicine  Pager [phone] Called by RN, pt with rectal temp 102. Tylenol to be given.   Pt without active complaints at present.     Ordered ua, u cx, cxr, axr, blood cx  x2.   Discussed with GI, recommended to start zosyn and CT Ab/pelvis with IV contrast and surgery eval  Discussed with attending, Dr. Rohit Bonner called to update, IV contrast consent and MRI safety sheet completed    Will continue to monitor     Akilah Degroot PA-C  Department of Medicine  Pager [phone]

## 2021-11-03 LAB
ALBUMIN SERPL ELPH-MCNC: 3.6 G/DL — SIGNIFICANT CHANGE UP (ref 3.3–5)
ALP SERPL-CCNC: 231 U/L — HIGH (ref 40–120)
ALT FLD-CCNC: 519 U/L — HIGH (ref 4–41)
ANION GAP SERPL CALC-SCNC: 12 MMOL/L — SIGNIFICANT CHANGE UP (ref 7–14)
AST SERPL-CCNC: 493 U/L — HIGH (ref 4–40)
BILIRUB SERPL-MCNC: 5.6 MG/DL — HIGH (ref 0.2–1.2)
BUN SERPL-MCNC: 16 MG/DL — SIGNIFICANT CHANGE UP (ref 7–23)
CALCIUM SERPL-MCNC: 9 MG/DL — SIGNIFICANT CHANGE UP (ref 8.4–10.5)
CHLORIDE SERPL-SCNC: 103 MMOL/L — SIGNIFICANT CHANGE UP (ref 98–107)
CO2 SERPL-SCNC: 23 MMOL/L — SIGNIFICANT CHANGE UP (ref 22–31)
CREAT SERPL-MCNC: 0.78 MG/DL — SIGNIFICANT CHANGE UP (ref 0.5–1.3)
GLUCOSE SERPL-MCNC: 113 MG/DL — HIGH (ref 70–99)
HAV IGM SER-ACNC: SIGNIFICANT CHANGE UP
HBV CORE IGM SER-ACNC: SIGNIFICANT CHANGE UP
HBV SURFACE AG SER-ACNC: SIGNIFICANT CHANGE UP
HCT VFR BLD CALC: 38.3 % — LOW (ref 39–50)
HCV AB S/CO SERPL IA: 0.39 S/CO — SIGNIFICANT CHANGE UP (ref 0–0.99)
HCV AB SERPL-IMP: SIGNIFICANT CHANGE UP
HGB BLD-MCNC: 13 G/DL — SIGNIFICANT CHANGE UP (ref 13–17)
MAGNESIUM SERPL-MCNC: 2.3 MG/DL — SIGNIFICANT CHANGE UP (ref 1.6–2.6)
MCHC RBC-ENTMCNC: 31.9 PG — SIGNIFICANT CHANGE UP (ref 27–34)
MCHC RBC-ENTMCNC: 33.9 GM/DL — SIGNIFICANT CHANGE UP (ref 32–36)
MCV RBC AUTO: 93.9 FL — SIGNIFICANT CHANGE UP (ref 80–100)
NRBC # BLD: 0 /100 WBCS — SIGNIFICANT CHANGE UP
NRBC # FLD: 0 K/UL — SIGNIFICANT CHANGE UP
PHOSPHATE SERPL-MCNC: 1.6 MG/DL — LOW (ref 2.5–4.5)
PLATELET # BLD AUTO: 159 K/UL — SIGNIFICANT CHANGE UP (ref 150–400)
POTASSIUM SERPL-MCNC: 3.7 MMOL/L — SIGNIFICANT CHANGE UP (ref 3.5–5.3)
POTASSIUM SERPL-SCNC: 3.7 MMOL/L — SIGNIFICANT CHANGE UP (ref 3.5–5.3)
PROT SERPL-MCNC: 7.1 G/DL — SIGNIFICANT CHANGE UP (ref 6–8.3)
RBC # BLD: 4.08 M/UL — LOW (ref 4.2–5.8)
RBC # FLD: 13.4 % — SIGNIFICANT CHANGE UP (ref 10.3–14.5)
SARS-COV-2 RNA SPEC QL NAA+PROBE: SIGNIFICANT CHANGE UP
SODIUM SERPL-SCNC: 138 MMOL/L — SIGNIFICANT CHANGE UP (ref 135–145)
WBC # BLD: 10 K/UL — SIGNIFICANT CHANGE UP (ref 3.8–10.5)
WBC # FLD AUTO: 10 K/UL — SIGNIFICANT CHANGE UP (ref 3.8–10.5)

## 2021-11-03 PROCEDURE — 99223 1ST HOSP IP/OBS HIGH 75: CPT | Mod: GC

## 2021-11-03 PROCEDURE — 93306 TTE W/DOPPLER COMPLETE: CPT | Mod: 26

## 2021-11-03 RX ORDER — SODIUM,POTASSIUM PHOSPHATES 278-250MG
1 POWDER IN PACKET (EA) ORAL ONCE
Refills: 0 | Status: COMPLETED | OUTPATIENT
Start: 2021-11-03 | End: 2021-11-03

## 2021-11-03 RX ADMIN — PANTOPRAZOLE SODIUM 40 MILLIGRAM(S): 20 TABLET, DELAYED RELEASE ORAL at 05:32

## 2021-11-03 RX ADMIN — SENNA PLUS 2 TABLET(S): 8.6 TABLET ORAL at 21:02

## 2021-11-03 RX ADMIN — AMLODIPINE BESYLATE 5 MILLIGRAM(S): 2.5 TABLET ORAL at 05:33

## 2021-11-03 RX ADMIN — PIPERACILLIN AND TAZOBACTAM 25 GRAM(S): 4; .5 INJECTION, POWDER, LYOPHILIZED, FOR SOLUTION INTRAVENOUS at 17:30

## 2021-11-03 RX ADMIN — Medication 1 PACKET(S): at 11:27

## 2021-11-03 RX ADMIN — Medication 81 MILLIGRAM(S): at 11:02

## 2021-11-03 RX ADMIN — PIPERACILLIN AND TAZOBACTAM 25 GRAM(S): 4; .5 INJECTION, POWDER, LYOPHILIZED, FOR SOLUTION INTRAVENOUS at 00:59

## 2021-11-03 RX ADMIN — POLYETHYLENE GLYCOL 3350 17 GRAM(S): 17 POWDER, FOR SOLUTION ORAL at 11:02

## 2021-11-03 RX ADMIN — ENOXAPARIN SODIUM 40 MILLIGRAM(S): 100 INJECTION SUBCUTANEOUS at 11:02

## 2021-11-03 RX ADMIN — Medication 25 MILLIGRAM(S): at 17:27

## 2021-11-03 RX ADMIN — PIPERACILLIN AND TAZOBACTAM 25 GRAM(S): 4; .5 INJECTION, POWDER, LYOPHILIZED, FOR SOLUTION INTRAVENOUS at 09:37

## 2021-11-03 RX ADMIN — Medication 25 MILLIGRAM(S): at 05:32

## 2021-11-03 RX ADMIN — ATORVASTATIN CALCIUM 40 MILLIGRAM(S): 80 TABLET, FILM COATED ORAL at 21:02

## 2021-11-03 NOTE — PROGRESS NOTE ADULT - ASSESSMENT
80M with PMHx Alzheimers dementia, sinus node dysfunction s/p PPM, HTN, pre-DM, severe AS, presenting with chest pain and noted with increased liver enzymes     Increased Liver Enzymes   ddx: Choledocholithiasis +/- Acute Cholecystitis  vs Hepatitis   trend LFTs daily  CT results pending; MRCP pending   pain control prn   tolerating diet   possible ERCP tomorrow pending imaging, Cardio clearance and family decision  Send Covid swab today     Chest Pain   cardiology work up appreciated    I reviewed the overnight course of events on the unit, re-confirming the patient history. I discussed the care with the patient and their family. The plan of care was discussed with the physician assistant and modifications were made to the notation where appropriate. Differential diagnosis and plan of care discussed with patient after the evaluation. Advanced care planning was discussed with patient and family.  Advanced care planning forms were reviewed and discussed.  Risks, benefits and alternatives of gastroenterologic procedures were discussed in detail and all questions were answered. 35 minutes spent on total encounter of which more than fifty percent of the encounter was spent counseling and/or coordinating care by the attending physician.  80M with PMHx Alzheimers dementia, sinus node dysfunction s/p PPM, HTN, pre-DM, severe AS, presenting with chest pain and noted with increased liver enzymes     Increased Liver Enzymes   ddx: Choledocholithiasis +/- Acute Cholecystitis  vs Hepatitis   Cont IV Abx   c/u CT results; MRCP pending   f/u surgery recs; ?Perc Stacia   trend LFTs daily  pain control prn   tolerating diet   possible ERCP tomorrow pending imaging, Cardio clearance and family decision  Send Covid swab today     Chest Pain   cardiology work up appreciated    I reviewed the overnight course of events on the unit, re-confirming the patient history. I discussed the care with the patient and their family. The plan of care was discussed with the physician assistant and modifications were made to the notation where appropriate. Differential diagnosis and plan of care discussed with patient after the evaluation. Advanced care planning was discussed with patient and family.  Advanced care planning forms were reviewed and discussed.  Risks, benefits and alternatives of gastroenterologic procedures were discussed in detail and all questions were answered. 35 minutes spent on total encounter of which more than fifty percent of the encounter was spent counseling and/or coordinating care by the attending physician.

## 2021-11-03 NOTE — PROGRESS NOTE ADULT - ASSESSMENT
80M with PMHx Alzheimers dementia, sinus node dysfunction s/p PPM, HTN, pre-DM, severe AS, presenting with chest pain x4 days.    EKG: NSR lateral TWI    1. Chest pain  -trops 11-13  -[facility] in 4/2021 with non obstructive CAD   -echo pending      2. Aortic Stenosis  -hx of severe AS, planned for TAVR but patient did not follow up, has dementia not a candidate for TAVR    3. HTN  -controlled  -c/w norvasc and metoprolol  -continue to monitor BP     4. ABD Pain  -patient with pain in RUQ, ABD US shows Mildly distended gallbladder with gallbladder sludge.  -overnight with fever and now elevated LFTs  -appreciate GI recs, plan for MRCP  -plan for possible ERCP. optimized from cardiac standpoint. given history of severe AS patient is high risk for ERCP under general anesthesia     5. DVT Prophylaxis   -lovenox subq

## 2021-11-03 NOTE — PROGRESS NOTE ADULT - ASSESSMENT
80M with PMHx Alzheimers dementia, sinus node dysfunction s/p PPM, HTN, pre-DM, severe AS, presenting with chest pain x4 days.    Problem/Plan - 1:  ·  Problem: Chest pain.   ·  Plan:Possibly secondary to AS  -monitor on tele  Follow-up echo      Problem/Plan - 2:  ·  Problem: Severe aortic stenosis.   Not a candidate for divers secondary to dementia    Problem/Plan - 3:  ·  Problem: Prediabetes.   Monitor fingersticks    Problem/Plan - 4:  ·  Problem: HTN (hypertension).   Continue current medications    Elevated LFTs: Appreciate GI input  Workup underway  Avoid hepatotoxic medication  Hold Lipitor  Plan ERCP    Fever: Continue antibiotics  Cultures negative

## 2021-11-03 NOTE — PROGRESS NOTE ADULT - ASSESSMENT
81yo M w/ hx Alzheimer's dementia (a&ox1), sinus node dysfunction s/p PPM, HTN, pre-DM, severe AS (was pending TAVR in April), now presenting with chest pain x4 days. Benign abdominal exam. US with sludge without evidence of acute cholecystitis   -No acute surgical intervention indicated; patient with benign abdominal exam and no reported pain. No leukocytosis and afebrile.  Additionally, patient a very poor operative candidate given severe AS and functional status. If patient's status changes or exam worsens, could consider HIDA with subsequent perc cassandra, but would not recommend this at the time given lack of tenderness on exam and nontoxic status. This would commit the patient to a drain that could be pulled during episodes of confusion.   -Agree with MRCP given elevated T bili and sludge on US.   -Will sign off at this time; please feel free to call with any further questions or concerns     B Team Surgery   [phone]

## 2021-11-04 LAB
ALBUMIN SERPL ELPH-MCNC: 3.7 G/DL — SIGNIFICANT CHANGE UP (ref 3.3–5)
ALP SERPL-CCNC: 230 U/L — HIGH (ref 40–120)
ALT FLD-CCNC: 406 U/L — HIGH (ref 4–41)
ANION GAP SERPL CALC-SCNC: 12 MMOL/L — SIGNIFICANT CHANGE UP (ref 7–14)
AST SERPL-CCNC: 237 U/L — HIGH (ref 4–40)
BILIRUB SERPL-MCNC: 5.8 MG/DL — HIGH (ref 0.2–1.2)
BUN SERPL-MCNC: 13 MG/DL — SIGNIFICANT CHANGE UP (ref 7–23)
CALCIUM SERPL-MCNC: 9.3 MG/DL — SIGNIFICANT CHANGE UP (ref 8.4–10.5)
CHLORIDE SERPL-SCNC: 100 MMOL/L — SIGNIFICANT CHANGE UP (ref 98–107)
CO2 SERPL-SCNC: 24 MMOL/L — SIGNIFICANT CHANGE UP (ref 22–31)
CREAT SERPL-MCNC: 0.74 MG/DL — SIGNIFICANT CHANGE UP (ref 0.5–1.3)
GLUCOSE SERPL-MCNC: 162 MG/DL — HIGH (ref 70–99)
HCT VFR BLD CALC: 41.9 % — SIGNIFICANT CHANGE UP (ref 39–50)
HGB BLD-MCNC: 14 G/DL — SIGNIFICANT CHANGE UP (ref 13–17)
MCHC RBC-ENTMCNC: 31.7 PG — SIGNIFICANT CHANGE UP (ref 27–34)
MCHC RBC-ENTMCNC: 33.4 GM/DL — SIGNIFICANT CHANGE UP (ref 32–36)
MCV RBC AUTO: 94.8 FL — SIGNIFICANT CHANGE UP (ref 80–100)
NRBC # BLD: 0 /100 WBCS — SIGNIFICANT CHANGE UP
NRBC # FLD: 0 K/UL — SIGNIFICANT CHANGE UP
PLATELET # BLD AUTO: 195 K/UL — SIGNIFICANT CHANGE UP (ref 150–400)
POTASSIUM SERPL-MCNC: 3.6 MMOL/L — SIGNIFICANT CHANGE UP (ref 3.5–5.3)
POTASSIUM SERPL-SCNC: 3.6 MMOL/L — SIGNIFICANT CHANGE UP (ref 3.5–5.3)
PROT SERPL-MCNC: 7.6 G/DL — SIGNIFICANT CHANGE UP (ref 6–8.3)
RBC # BLD: 4.42 M/UL — SIGNIFICANT CHANGE UP (ref 4.2–5.8)
RBC # FLD: 13.6 % — SIGNIFICANT CHANGE UP (ref 10.3–14.5)
SODIUM SERPL-SCNC: 136 MMOL/L — SIGNIFICANT CHANGE UP (ref 135–145)
WBC # BLD: 12.42 K/UL — HIGH (ref 3.8–10.5)
WBC # FLD AUTO: 12.42 K/UL — HIGH (ref 3.8–10.5)

## 2021-11-04 RX ADMIN — PIPERACILLIN AND TAZOBACTAM 25 GRAM(S): 4; .5 INJECTION, POWDER, LYOPHILIZED, FOR SOLUTION INTRAVENOUS at 01:26

## 2021-11-04 RX ADMIN — POLYETHYLENE GLYCOL 3350 17 GRAM(S): 17 POWDER, FOR SOLUTION ORAL at 18:10

## 2021-11-04 RX ADMIN — AMLODIPINE BESYLATE 5 MILLIGRAM(S): 2.5 TABLET ORAL at 06:13

## 2021-11-04 RX ADMIN — Medication 25 MILLIGRAM(S): at 06:13

## 2021-11-04 RX ADMIN — Medication 81 MILLIGRAM(S): at 18:10

## 2021-11-04 RX ADMIN — PANTOPRAZOLE SODIUM 40 MILLIGRAM(S): 20 TABLET, DELAYED RELEASE ORAL at 06:14

## 2021-11-04 RX ADMIN — PIPERACILLIN AND TAZOBACTAM 25 GRAM(S): 4; .5 INJECTION, POWDER, LYOPHILIZED, FOR SOLUTION INTRAVENOUS at 18:11

## 2021-11-04 RX ADMIN — SENNA PLUS 2 TABLET(S): 8.6 TABLET ORAL at 21:24

## 2021-11-04 RX ADMIN — PIPERACILLIN AND TAZOBACTAM 25 GRAM(S): 4; .5 INJECTION, POWDER, LYOPHILIZED, FOR SOLUTION INTRAVENOUS at 08:40

## 2021-11-04 RX ADMIN — Medication 25 MILLIGRAM(S): at 18:10

## 2021-11-04 NOTE — PROGRESS NOTE ADULT - ASSESSMENT
80M with PMHx Alzheimers dementia, sinus node dysfunction s/p PPM, HTN, pre-DM, severe AS, presenting with chest pain x4 days.    Problem/Plan - 1:  ·  Problem: Chest pain.   ·  Plan:Possibly secondary to AS  -monitor on tele  Follow-up echo      Problem/Plan - 2:  ·  Problem: Severe aortic stenosis.   Not a candidate for divers secondary to dementia    Problem/Plan - 3:  ·  Problem: Prediabetes.   Monitor fingersticks    Problem/Plan - 4:  ·  Problem: HTN (hypertension).   Continue current medications    Elevated LFTs: Appreciate GI input  ERCP: Status post  sphincterectomy sphincter, And gallstone removalercp    Fever: Continue antibiotics  Cultures negativeNo

## 2021-11-04 NOTE — PROGRESS NOTE ADULT - ASSESSMENT
80M with PMHx Alzheimers dementia, sinus node dysfunction s/p PPM, HTN, pre-DM, severe AS, presenting with chest pain x4 days.    EKG: NSR lateral TWI    1. Chest pain  -trops 11-13  -[facility] in 4/2021 with non obstructive CAD   -echo with severe AS, normal LV function, no WMA. mild diastolic dysfunction      2. Aortic Stenosis  -hx of severe AS, planned for TAVR but patient did not follow up, has dementia not a candidate for TAVR    3. HTN  -controlled  -c/w norvasc and metoprolol  -continue to monitor BP     4. ABD Pain  -patient with pain in RUQ, ABD US shows Mildly distended gallbladder with gallbladder sludge.  -overnight with fever and now elevated LFTs  -appreciate GI recs,   -s/p ERCP doing well     5. DVT Prophylaxis   -lovenox subq

## 2021-11-05 LAB
ANION GAP SERPL CALC-SCNC: 12 MMOL/L — SIGNIFICANT CHANGE UP (ref 7–14)
BUN SERPL-MCNC: 14 MG/DL — SIGNIFICANT CHANGE UP (ref 7–23)
CALCIUM SERPL-MCNC: 9.1 MG/DL — SIGNIFICANT CHANGE UP (ref 8.4–10.5)
CHLORIDE SERPL-SCNC: 102 MMOL/L — SIGNIFICANT CHANGE UP (ref 98–107)
CO2 SERPL-SCNC: 24 MMOL/L — SIGNIFICANT CHANGE UP (ref 22–31)
CREAT SERPL-MCNC: 0.84 MG/DL — SIGNIFICANT CHANGE UP (ref 0.5–1.3)
GLUCOSE SERPL-MCNC: 114 MG/DL — HIGH (ref 70–99)
HCT VFR BLD CALC: 38.7 % — LOW (ref 39–50)
HGB BLD-MCNC: 13.8 G/DL — SIGNIFICANT CHANGE UP (ref 13–17)
MAGNESIUM SERPL-MCNC: 2.3 MG/DL — SIGNIFICANT CHANGE UP (ref 1.6–2.6)
MCHC RBC-ENTMCNC: 32.4 PG — SIGNIFICANT CHANGE UP (ref 27–34)
MCHC RBC-ENTMCNC: 35.7 GM/DL — SIGNIFICANT CHANGE UP (ref 32–36)
MCV RBC AUTO: 90.8 FL — SIGNIFICANT CHANGE UP (ref 80–100)
NRBC # BLD: 0 /100 WBCS — SIGNIFICANT CHANGE UP
NRBC # FLD: 0 K/UL — SIGNIFICANT CHANGE UP
PHOSPHATE SERPL-MCNC: 1.4 MG/DL — LOW (ref 2.5–4.5)
PLATELET # BLD AUTO: 185 K/UL — SIGNIFICANT CHANGE UP (ref 150–400)
POTASSIUM SERPL-MCNC: 3.4 MMOL/L — LOW (ref 3.5–5.3)
POTASSIUM SERPL-SCNC: 3.4 MMOL/L — LOW (ref 3.5–5.3)
RBC # BLD: 4.26 M/UL — SIGNIFICANT CHANGE UP (ref 4.2–5.8)
RBC # FLD: 13.3 % — SIGNIFICANT CHANGE UP (ref 10.3–14.5)
SODIUM SERPL-SCNC: 138 MMOL/L — SIGNIFICANT CHANGE UP (ref 135–145)
WBC # BLD: 9.06 K/UL — SIGNIFICANT CHANGE UP (ref 3.8–10.5)
WBC # FLD AUTO: 9.06 K/UL — SIGNIFICANT CHANGE UP (ref 3.8–10.5)

## 2021-11-05 PROCEDURE — 99497 ADVNCD CARE PLAN 30 MIN: CPT

## 2021-11-05 RX ORDER — SODIUM,POTASSIUM PHOSPHATES 278-250MG
1 POWDER IN PACKET (EA) ORAL THREE TIMES A DAY
Refills: 0 | Status: COMPLETED | OUTPATIENT
Start: 2021-11-05 | End: 2021-11-07

## 2021-11-05 RX ADMIN — AMLODIPINE BESYLATE 5 MILLIGRAM(S): 2.5 TABLET ORAL at 06:07

## 2021-11-05 RX ADMIN — PIPERACILLIN AND TAZOBACTAM 25 GRAM(S): 4; .5 INJECTION, POWDER, LYOPHILIZED, FOR SOLUTION INTRAVENOUS at 09:19

## 2021-11-05 RX ADMIN — POLYETHYLENE GLYCOL 3350 17 GRAM(S): 17 POWDER, FOR SOLUTION ORAL at 17:05

## 2021-11-05 RX ADMIN — PIPERACILLIN AND TAZOBACTAM 25 GRAM(S): 4; .5 INJECTION, POWDER, LYOPHILIZED, FOR SOLUTION INTRAVENOUS at 01:06

## 2021-11-05 RX ADMIN — SENNA PLUS 2 TABLET(S): 8.6 TABLET ORAL at 21:45

## 2021-11-05 RX ADMIN — Medication 25 MILLIGRAM(S): at 17:11

## 2021-11-05 RX ADMIN — Medication 25 MILLIGRAM(S): at 06:06

## 2021-11-05 RX ADMIN — Medication 81 MILLIGRAM(S): at 17:11

## 2021-11-05 RX ADMIN — Medication 1 PACKET(S): at 18:53

## 2021-11-05 RX ADMIN — PANTOPRAZOLE SODIUM 40 MILLIGRAM(S): 20 TABLET, DELAYED RELEASE ORAL at 06:07

## 2021-11-05 RX ADMIN — PIPERACILLIN AND TAZOBACTAM 25 GRAM(S): 4; .5 INJECTION, POWDER, LYOPHILIZED, FOR SOLUTION INTRAVENOUS at 17:05

## 2021-11-05 NOTE — PROGRESS NOTE ADULT - ASSESSMENT
80M with PMHx Alzheimers dementia, sinus node dysfunction s/p PPM, HTN, pre-DM, severe AS, presenting with chest pain and noted with increased liver enzymes     Increased Liver Enzymes   2/2 CBD stone s/p ERCP with biliary sphincterotomy   Trend LFTS  pain control prn   keep stools soft   diet as tolerated   likely hospice planning going forward; appreciate Palliative input     Chest Pain   cardiology work up appreciated; not TAVR candidate     I reviewed the overnight course of events on the unit, re-confirming the patient history. I discussed the care with the patient and their family. The plan of care was discussed with the physician assistant and modifications were made to the notation where appropriate. Differential diagnosis and plan of care discussed with patient after the evaluation. Advanced care planning was discussed with patient and family.  Advanced care planning forms were reviewed and discussed.  Risks, benefits and alternatives of gastroenterologic procedures were discussed in detail and all questions were answered. 35 minutes spent on total encounter of which more than fifty percent of the encounter was spent counseling and/or coordinating care by the attending physician.

## 2021-11-05 NOTE — PROGRESS NOTE ADULT - ASSESSMENT
80M with PMHx Alzheimers dementia, sinus node dysfunction s/p PPM, HTN, pre-DM, severe AS, presenting with chest pain x4 days.    Problem/Plan - 1:  ·  Problem: Chest pain.   ·  Plan:Possibly secondary to AS  -monitor on tele  Follow-up echo      Problem/Plan - 2:  ·  Problem: Severe aortic stenosis.   Not a candidate for divers secondary to dementia    Problem/Plan - 3:  ·  Problem: Prediabetes.   Monitor fingersticks    Problem/Plan - 4:  ·  Problem: HTN (hypertension).   Continue current medications    Elevated LFTs: Appreciate GI input  ERCP: Status post  sphincterectomy sphincter, And gallstone removal   Continue antibiotics: would cont for 7 days total : defer to GI   Cultures negative

## 2021-11-05 NOTE — GOALS OF CARE CONVERSATION - ADVANCED CARE PLANNING - CONVERSATION DETAILS
Full note to follow     In summary, family agreeable to hospice referral. Discussed with Unit CM Palliative provider following up with patient's granddaughter, Reanna Bonner, regarding goc. Reanna explained that she understood that her grandfather went for procedure yesterday and "tolerated it well". She said she went to visit him last evening and patient was in good spirits. She also shared that she understood that patient was not being offered TAVR anymore. I introduced hospice services to her, which she was familiar with as her grandmother received hospice services in the past. She was familiar with the services of hospice including DMT, HHA, sx management and nurse visits. She was agreeable to hospice referral. Discussed with Unit CM for hospice referral. Palliative provider following up with patient's granddaughter, Reanna Bonner, regarding goc. Reanna explained that she understood that her grandfather went for procedure yesterday and "tolerated it well". She said she went to visit him last evening and patient was in good spirits. She also shared that she understood that patient was not being offered TAVR anymore. I introduced hospice services to her, which she was familiar with as her grandmother received hospice services in the past. She was familiar with the services of hospice including DMT, HHA, sx management and nurse visits. She was agreeable to hospice referral. Discussed with Unit CM for hospice referral.    Goals are clear. Patient is DNR/DNI, hospice appropriate with hospice referral completed. Palliative team signing off.     Thank you for allowing us to participate in your patient's care. Please page [phone] for any q's or c's.     Due to visitation restrictions in the hospital in light of COVID pandemic, all discussions with the patient/ patient's healthcare proxy or surrogate have been done via telehealth. This is to prevent spread of infection within the hospital and out in the community. Total time discussing advance care planning, goals of care discussions, and discussions about code status and hospice = 16 mins

## 2021-11-05 NOTE — PROGRESS NOTE ADULT - ASSESSMENT
80M with PMHx Alzheimers dementia, sinus node dysfunction s/p PPM, HTN, pre-DM, severe AS, presenting with chest pain x4 days.    EKG: NSR lateral TWI    1. Chest pain  -trops 11-13  -[facility] in 4/2021 with non obstructive CAD   -echo with severe AS, normal LV function, no WMA. mild diastolic dysfunction      2. Aortic Stenosis  -hx of severe AS, planned for TAVR but patient did not follow up, has dementia not a candidate for TAVR    3. HTN  -controlled  -c/w norvasc and metoprolol  -continue to monitor BP     4. ABD Pain  -patient with pain in RUQ, ABD US shows Mildly distended gallbladder with gallbladder sludge.  -overnight with fever and now elevated LFTs  -appreciate GI recs,   -s/p ERCP doing well   -cont abx    5. DVT Prophylaxis   -lovenox subq

## 2021-11-06 LAB
ALBUMIN SERPL ELPH-MCNC: 3.5 G/DL — SIGNIFICANT CHANGE UP (ref 3.3–5)
ALP SERPL-CCNC: 260 U/L — HIGH (ref 40–120)
ALT FLD-CCNC: 291 U/L — HIGH (ref 4–41)
ANION GAP SERPL CALC-SCNC: 12 MMOL/L — SIGNIFICANT CHANGE UP (ref 7–14)
AST SERPL-CCNC: 163 U/L — HIGH (ref 4–40)
BILIRUB SERPL-MCNC: 2.5 MG/DL — HIGH (ref 0.2–1.2)
BUN SERPL-MCNC: 9 MG/DL — SIGNIFICANT CHANGE UP (ref 7–23)
CALCIUM SERPL-MCNC: 9.3 MG/DL — SIGNIFICANT CHANGE UP (ref 8.4–10.5)
CHLORIDE SERPL-SCNC: 99 MMOL/L — SIGNIFICANT CHANGE UP (ref 98–107)
CO2 SERPL-SCNC: 23 MMOL/L — SIGNIFICANT CHANGE UP (ref 22–31)
CREAT SERPL-MCNC: 0.76 MG/DL — SIGNIFICANT CHANGE UP (ref 0.5–1.3)
GLUCOSE SERPL-MCNC: 91 MG/DL — SIGNIFICANT CHANGE UP (ref 70–99)
HCT VFR BLD CALC: 41.1 % — SIGNIFICANT CHANGE UP (ref 39–50)
HGB BLD-MCNC: 13.9 G/DL — SIGNIFICANT CHANGE UP (ref 13–17)
MCHC RBC-ENTMCNC: 31.5 PG — SIGNIFICANT CHANGE UP (ref 27–34)
MCHC RBC-ENTMCNC: 33.8 GM/DL — SIGNIFICANT CHANGE UP (ref 32–36)
MCV RBC AUTO: 93.2 FL — SIGNIFICANT CHANGE UP (ref 80–100)
NRBC # BLD: 0 /100 WBCS — SIGNIFICANT CHANGE UP
NRBC # FLD: 0 K/UL — SIGNIFICANT CHANGE UP
PLATELET # BLD AUTO: 196 K/UL — SIGNIFICANT CHANGE UP (ref 150–400)
POTASSIUM SERPL-MCNC: 3.1 MMOL/L — LOW (ref 3.5–5.3)
POTASSIUM SERPL-SCNC: 3.1 MMOL/L — LOW (ref 3.5–5.3)
PROT SERPL-MCNC: 7.4 G/DL — SIGNIFICANT CHANGE UP (ref 6–8.3)
RBC # BLD: 4.41 M/UL — SIGNIFICANT CHANGE UP (ref 4.2–5.8)
RBC # FLD: 13.4 % — SIGNIFICANT CHANGE UP (ref 10.3–14.5)
SODIUM SERPL-SCNC: 134 MMOL/L — LOW (ref 135–145)
WBC # BLD: 9.96 K/UL — SIGNIFICANT CHANGE UP (ref 3.8–10.5)
WBC # FLD AUTO: 9.96 K/UL — SIGNIFICANT CHANGE UP (ref 3.8–10.5)

## 2021-11-06 RX ORDER — LANOLIN ALCOHOL/MO/W.PET/CERES
3 CREAM (GRAM) TOPICAL ONCE
Refills: 0 | Status: COMPLETED | OUTPATIENT
Start: 2021-11-06 | End: 2021-11-06

## 2021-11-06 RX ORDER — POTASSIUM CHLORIDE 20 MEQ
40 PACKET (EA) ORAL EVERY 4 HOURS
Refills: 0 | Status: COMPLETED | OUTPATIENT
Start: 2021-11-06 | End: 2021-11-06

## 2021-11-06 RX ADMIN — AMLODIPINE BESYLATE 5 MILLIGRAM(S): 2.5 TABLET ORAL at 05:31

## 2021-11-06 RX ADMIN — PIPERACILLIN AND TAZOBACTAM 25 GRAM(S): 4; .5 INJECTION, POWDER, LYOPHILIZED, FOR SOLUTION INTRAVENOUS at 18:36

## 2021-11-06 RX ADMIN — Medication 1 PACKET(S): at 13:01

## 2021-11-06 RX ADMIN — Medication 40 MILLIEQUIVALENT(S): at 18:39

## 2021-11-06 RX ADMIN — Medication 3 MILLIGRAM(S): at 22:37

## 2021-11-06 RX ADMIN — Medication 1 PACKET(S): at 08:13

## 2021-11-06 RX ADMIN — PANTOPRAZOLE SODIUM 40 MILLIGRAM(S): 20 TABLET, DELAYED RELEASE ORAL at 05:31

## 2021-11-06 RX ADMIN — POLYETHYLENE GLYCOL 3350 17 GRAM(S): 17 POWDER, FOR SOLUTION ORAL at 18:39

## 2021-11-06 RX ADMIN — Medication 1 PACKET(S): at 22:37

## 2021-11-06 RX ADMIN — Medication 25 MILLIGRAM(S): at 05:31

## 2021-11-06 RX ADMIN — Medication 40 MILLIEQUIVALENT(S): at 22:38

## 2021-11-06 RX ADMIN — SENNA PLUS 2 TABLET(S): 8.6 TABLET ORAL at 22:37

## 2021-11-06 RX ADMIN — Medication 1 PACKET(S): at 01:11

## 2021-11-06 RX ADMIN — PIPERACILLIN AND TAZOBACTAM 25 GRAM(S): 4; .5 INJECTION, POWDER, LYOPHILIZED, FOR SOLUTION INTRAVENOUS at 01:11

## 2021-11-06 RX ADMIN — Medication 40 MILLIEQUIVALENT(S): at 13:01

## 2021-11-06 RX ADMIN — Medication 25 MILLIGRAM(S): at 18:40

## 2021-11-06 RX ADMIN — Medication 81 MILLIGRAM(S): at 18:39

## 2021-11-06 RX ADMIN — PIPERACILLIN AND TAZOBACTAM 25 GRAM(S): 4; .5 INJECTION, POWDER, LYOPHILIZED, FOR SOLUTION INTRAVENOUS at 08:13

## 2021-11-06 NOTE — CONSULT NOTE ADULT - ASSESSMENT
80M with PMHx Alzheimers dementia, sinus node dysfunction s/p PPM, HTN, pre-DM, severe AS, presenting with chest pain x4 days     abd pain/ choledocholithiasis/ leukocytosis  found to have increasing liver enzymes and bilirubin   s/p hepatic US w/ Mildly distended gallbladder with gallbladder sludge  s/p CT abd/pelvis w/ Minimal gallbladder wall edema with question area of wall irregularity  He was started on zosyn on 11/2  s/p ERCP found to have filling defect consistent with a stone on the cholangiogram s/p removal s/p biliary sphincterotomy 11/4  liver enzymes now improving  f/u blood cx 11/2- NGTD  c/w zosyn to complete 5 day course from date of ERCP (last dose 11/9)  If discharged prior to end date can transition to cefpodoxime 200mg bid and flagyl 500mg every 8 hours    chest pain   s/p TTE w/ severe AS  cardiology following    Joselo Baumann M.D.  [phone]  [facility], Division of Infectious Diseases 80M with PMHx Alzheimers dementia, sinus node dysfunction s/p PPM, HTN, pre-DM, severe AS, presenting with chest pain x4 days     abd pain/ leukocytosis  found to have increasing liver enzymes and bilirubin   s/p hepatic US w/ Mildly distended gallbladder with gallbladder sludge  s/p CT abd/pelvis w/ Minimal gallbladder wall edema with question area of wall irregularity  He was started on zosyn on 11/2  s/p ERCP found to have filling defect consistent with a stone on the cholangiogram s/p removal s/p biliary sphincterotomy 11/4  liver enzymes now improving  f/u blood cx 11/2- NGTD  c/w zosyn to complete 5 day course from date of ERCP (last dose 11/9)  If discharged prior to end date can transition to cefpodoxime 200mg bid and flagyl 500mg every 8 hours    chest pain   s/p TTE w/ severe AS  cardiology following    Joselo Baumann M.D.  [phone]  [facility], Division of Infectious Diseases

## 2021-11-06 NOTE — PROGRESS NOTE ADULT - ASSESSMENT
80M with PMHx Alzheimers dementia, sinus node dysfunction s/p PPM, HTN, pre-DM, severe AS, presenting with chest pain and noted with increased liver enzymes     Increased Liver Enzymes   2/2 CBD stone s/p ERCP with biliary sphincterotomy   Trend LFTS; Bilirubin downtrending appropriately   pain control prn   keep stools soft   diet as tolerated   likely hospice planning going forward; appreciate Palliative input     Chest Pain   cardiology work up appreciated; not TAVR candidate     I reviewed the overnight course of events on the unit, re-confirming the patient history. I discussed the care with the patient and their family. The plan of care was discussed with the physician assistant and modifications were made to the notation where appropriate. Differential diagnosis and plan of care discussed with patient after the evaluation. Advanced care planning was discussed with patient and family.  Advanced care planning forms were reviewed and discussed.  Risks, benefits and alternatives of gastroenterologic procedures were discussed in detail and all questions were answered. 35 minutes spent on total encounter of which more than fifty percent of the encounter was spent counseling and/or coordinating care by the attending physician.

## 2021-11-06 NOTE — CONSULT NOTE ADULT - CONSULT REQUESTED DATE/TIME
01-Nov-2021 14:01
06-Nov-2021 13:18
02-Nov-2021 14:18
02-Nov-2021 21:15
01-Nov-2021
01-Nov-2021 15:35

## 2021-11-06 NOTE — CONSULT NOTE ADULT - CONSULT REASON
hyperbilirubinemia
Comanagementof medical problems
r/o acute cholecystits
abdominal pain, elevated liver enzymes
chest pain
goc

## 2021-11-06 NOTE — PROGRESS NOTE ADULT - ASSESSMENT
80M with PMHx Alzheimers dementia, sinus node dysfunction s/p PPM, HTN, pre-DM, severe AS, presenting with chest pain x4 days.    EKG: NSR lateral TWI    1. Chest pain  -trops 11-13  -[facility] in 4/2021 with non obstructive CAD   -echo with severe AS, normal LV function, no WMA. mild diastolic dysfunction      2. Aortic Stenosis  -hx of severe AS, planned for TAVR but patient did not follow up, has dementia not a candidate for TAVR    3. HTN  -controlled  -c/w norvasc and metoprolol  -continue to monitor BP     4. ABD Pain  -patient with pain in RUQ, ABD US shows Mildly distended gallbladder with gallbladder sludge.  -overnight with fever and now elevated LFTs  -appreciate GI recs,   -s/p ERCP doing well   -cont abx    5. DVT Prophylaxis   -lovenox subq

## 2021-11-06 NOTE — CONSULT NOTE ADULT - SUBJECTIVE AND OBJECTIVE BOX
HPI:  Patient with dementia Unable to provide Any history. To me denied any chest pain or shortness of breath.    80M with PMHx Alzheimers dementia, sinus node dysfunction s/p PPM, HTN, pre-DM, severe AS, presenting with chest pain x4 days. Patient is a limited historian 2/2 dementia and cannot recall why he was brought to the hospital and does not know he is in the hospital. Oriented only to name. He often answers questions tangentially as well. When asked pointed questions about chest pain he endorses mild, left sided chest pressure with radiation to left arm that self-resolved after 30 minutes earlier this morning. Collateral obtained by grandson Maged Bonner. Patient lives with grandson who primarily takes care of him. Patient has had 4 days of intermittent chest pain ranging from left to right side. Today he saw him clenching his chest and it was more persistent. He is unsure if related to exertion but believes it might be. He also had an episode of constipation followed by diarrhea two days ago but now resolved. No SOB, fevers, cough. Of note patient has been difficult to get to medical appts. He was supposed to get a TAVR as outpatient after his admission in April with PPM for sinus node dysfunction. However, patient has not been able to get done. 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 The grandson was told of open heart surgery needed but is unsure specifically what kind. Pt follows with Dr. Jimenez (cardiologist)    [facility] discussion:  On chart review, patient had a MOLST completed in April 2021 (on alpha in [place] visit history) by Kirstenjuli Ruano (daughter) who was designated as surrogate due to patient's dementia and made patient DNR/DNI. Attempts to call daughter at number provided on MOLST were unsuccessful and unable to confirm if this are her wishes. According to the grandson, the daughter (who is his mother) is now estranged to the family and lives in [place]. She is currently hospitalized with COVID and unreachable. The granddaughter Reanna Bonner is not able to reach her at the same number provided in the chart as well and has been out of contact. The patient has other children but the grandchildren do not have their phone numbers and report that they are not involved in his life. They also haven't seen them for ~20 years. The patient currently lives with the grandson who has been primarily taking care of him since July. Patient has dementia and lacks capacity to make his own medical decisions. He does not explain back and is disoriented. He is tangential in his thought process. Both the grandson Maegd and granddaughter Reanna would like to discuss GOC/code status between each other and will further inform providers of their wishes tomorrow. They believe his wishes were likely to be DNR/DNI    The daughter Kirsten who was noted to be a surrogate on a prior admission is now estranged to the family. The son believes the daughter (who is his mother) is in [place] hospitalized from COVID. Attempts to reach her with prior contact info were unsuccessful.      Home meds confirmed with granddaughter Reanna Bonner  Metoprolol 25mg BID  Pantoprazole 40mg qd  Atorvastatin 40mg qhs  Amlodipine 5mg qd  ASA 81mg qd (31 Oct 2021 21:46)      REVIEW OF SYSTEMS:    CONSTITUTIONAL: No weakness, fevers or chills  EYES/ENT: No visual changes;  No vertigo or throat pain   NECK: No pain or stiffness  RESPIRATORY: No cough, wheezing, hemoptysis; No shortness of breath  CARDIOVASCULAR: No chest pain or palpitations  GASTROINTESTINAL: No abdominal or epigastric pain. No nausea, vomiting, or hematemesis; No diarrhea or constipation. No melena or hematochezia.  GENITOURINARY: No dysuria, frequency or hematuria  NEUROLOGICAL: No numbness or weakness  SKIN: No itching, burning, rashes, or lesions   All other review of systems is negative unless indicated above.      Medications:   MEDICATIONS  (STANDING):  amLODIPine   Tablet 5 milliGRAM(s) Oral daily  aspirin  chewable 81 milliGRAM(s) Oral daily  atorvastatin 40 milliGRAM(s) Oral at bedtime  enoxaparin Injectable 40 milliGRAM(s) SubCutaneous daily  haloperidol    Injectable 1 milliGRAM(s) IV Push once  metoprolol tartrate 25 milliGRAM(s) Oral two times a day  pantoprazole    Tablet 40 milliGRAM(s) Oral before breakfast    MEDICATIONS  (PRN):  acetaminophen     Tablet .. 650 milliGRAM(s) Oral every 6 hours PRN Temp greater or equal to 38C (100.4F), Mild Pain (1 - 3)      Allergies    No Known Allergies    Intolerances        PAST MEDICAL & SURGICAL HISTORY:  Alzheimer&#x27;s disease    HTN (hypertension)    Prediabetes    Severe aortic stenosis    Pacemaker        Social :    No smoking       No ETOH use            Vital Signs Last 24 Hrs  T(C): 36.8 (01 Nov 2021 21:00), Max: 37.4 (31 Oct 2021 23:17)  T(F): 98.2 (01 Nov 2021 21:00), Max: 99.3 (31 Oct 2021 23:17)  HR: 79 (01 Nov 2021 21:00) (71 - 80)  BP: 130/68 (01 Nov 2021 21:49) (100/61 - 130/68)  BP(mean): --  RR: 18 (01 Nov 2021 21:49) (18 - 19)  SpO2: 100% (01 Nov 2021 21:49) (99% - 100%)  CAPILLARY BLOOD GLUCOSE            Physical Exam:    Daily     Daily   General:  Distress  HEENT:  Nonicteric, PERRLA  CV:  RRR, S1-S2, systolic murmur  Lungs:  CTA B/L, no wheezes, rales, rhonchi  Abdomen:  Soft, non-tender, no distended, positive BS, no hepatosplenomegaly  Extremities:  No edema  Neuro:  Alert not oriented otherwise nonfocal  No Restraints    LABS:                        14.0   9.60  )-----------( 179      ( 31 Oct 2021 17:38 )             40.7     10-31    139  |  101  |  16  ----------------------------<  118<H>  3.7   |  26  |  0.83    Ca    9.7      31 Oct 2021 17:38    TPro  7.9  /  Alb  4.5  /  TBili  1.8<H>  /  DBili  x   /  AST  19  /  ALT  20  /  AlkPhos  61  10-31                RADIOLOGY & ADDITIONAL TESTS:    ---------------------------------------------------------------------------  I personally reviewed: [  ]EKG   [  ]CXR    [  ] CT    [  ]Other  ---------------------------------------------------------------------------  
  Chief Complaint:  Patient is a 80y old  Male who presents with a chief complaint of chest pain x4 days (02 Nov 2021 10:40)    Alzheimer&#x27;s disease    HTN (hypertension)    Prediabetes    Severe aortic stenosis    Pacemaker       HPI:  80M with PMHx Alzheimers dementia, sinus node dysfunction s/p PPM, HTN, pre-DM, severe AS, presenting with chest pain x4 days. Patient is a limited historian 2/2 dementia and cannot recall why he was brought to the hospital and does not know he is in the hospital. Oriented only to name. He often answers questions tangentially as well. When asked pointed questions about chest pain he endorses mild, left sided chest pressure with radiation to left arm that self-resolved after 30 minutes earlier this morning. Collateral obtained by grandson Maged Bonner. Patient lives with grandson who primarily takes care of him. Patient has had 4 days of intermittent chest pain ranging from left to right side. Today he saw him clenching his chest and it was more persistent. He is unsure if related to exertion but believes it might be. He also had an episode of constipation followed by diarrhea two days ago but now resolved. No SOB, fevers, cough. Of note patient has been difficult to get to medical appts. He was supposed to get a TAVR as outpatient after his admission in April with PPM for sinus node dysfunction. However, patient has not been able to get done. 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 The grandson was told of open heart surgery needed but is unsure specifically what kind. Pt follows with Dr. Jimenez (cardiologist)  GI consulted for increased liver enzymes. Pt confused but able to convey he is with epigastric pain and some mild right abd pain.     GOC discussion:  On chart review, patient had a MOLST completed in April 2021 (on alpha in [place] visit history) by Kirsten Ruano (daughter) who was designated as surrogate due to patient's dementia and made patient DNR/DNI. Attempts to call daughter at number provided on MOLST were unsuccessful and unable to confirm if this are her wishes. According to the grandson, the daughter (who is his mother) is now estranged to the family and lives in [place]. She is currently hospitalized with COVID and unreachable. The granddaughter Reanna Bonner is not able to reach her at the same number provided in the chart as well and has been out of contact. The patient has other children but the grandchildren do not have their phone numbers and report that they are not involved in his life. They also haven't seen them for ~20 years. The patient currently lives with the grandson who has been primarily taking care of him since July. Patient has dementia and lacks capacity to make his own medical decisions. He does not explain back and is disoriented. He is tangential in his thought process. Both the grandson Maged and granddaughter Reanna would like to discuss GOC/code status between each other and will further inform providers of their wishes tomorrow. They believe his wishes were likely to be DNR/DNI    The daughter Kirsten who was noted to be a surrogate on a prior admission is now estranged to the family. The son believes the daughter (who is his mother) is in [place] hospitalized from COVID. Attempts to reach her with prior contact info were unsuccessful.      Home meds confirmed with granddaamanda Bonner  Metoprolol 25mg BID  Pantoprazole 40mg qd  Atorvastatin 40mg qhs  Amlodipine 5mg qd  ASA 81mg qd (31 Oct 2021 21:46)      No Known Allergies      acetaminophen     Tablet .. 650 milliGRAM(s) Oral every 6 hours PRN  amLODIPine   Tablet 5 milliGRAM(s) Oral daily  aspirin  chewable 81 milliGRAM(s) Oral daily  atorvastatin 40 milliGRAM(s) Oral at bedtime  enoxaparin Injectable 40 milliGRAM(s) SubCutaneous daily  haloperidol    Injectable 1 milliGRAM(s) IV Push once  metoprolol tartrate 25 milliGRAM(s) Oral two times a day  pantoprazole    Tablet 40 milliGRAM(s) Oral before breakfast  potassium phosphate / sodium phosphate Powder (PHOS-NaK) 1 Packet(s) Oral once        FAMILY HISTORY:  No pertinent family history in first degree relatives          Review of Systems:    General:  No wt loss, fevers, chills, night sweats, fatigue  Eyes:  Good vision, no reported pain  ENT:  No sore throat, pain, runny nose, dysphagia  CV:  No pain, palpitations, no lightheadedness  Resp:  No dyspnea, cough, tachypnea, wheezing  GI: .  :  No pain, bleeding, incontinence, nocturia  Muscle:  No pain, weakness  Neuro:  No weakness, tingling, memory problems  Psych:  No fatigue, insomnia, mood problems, depression  Endocrine:  No polyuria, polydypsia, cold/heat intolerance  Heme:  No petechiae, ecchymosis, easy bruisability  Skin:  No rash, tattoos, scars, edema    Relevant Family History:   n/c    Relevant Social History: n/c      Physical Exam:    Vital Signs:  Vital Signs Last 24 Hrs  T(C): 37.2 (02 Nov 2021 12:18), Max: 37.2 (02 Nov 2021 12:18)  T(F): 99 (02 Nov 2021 12:18), Max: 99 (02 Nov 2021 12:18)  HR: 72 (02 Nov 2021 05:37) (61 - 81)  BP: 123/64 (02 Nov 2021 12:18) (106/52 - 130/68)  BP(mean): --  RR: 18 (02 Nov 2021 12:18) (17 - 18)  SpO2: 99% (02 Nov 2021 12:18) (99% - 100%)  Daily     Daily     General:  Appears stated age, well-groomed, nad  HEENT:  NC/AT,  conjunctivae clear and pink, no thyromegaly, nodules, adenopathy, no JVD  Chest:  Full & symmetric excursion, no increased effort, breath sounds clear  Cardiovascular:  Regular rhythm, S1, S2, no murmur/rub/S3/S4, no abdominal bruit, no edema  Abdomen:  Soft, +ttp epigastric , non-distended, normoactive bowel sounds,  no masses ,no hepatosplenomeagaly, no signs of chronic liver disease  Extremities:  no cyanosis,clubbing or edema  Skin:  No rash/erythema/ecchymoses/petechiae/wounds/abscess/warm/dry  Neuro/Psych:  A&Ox1-2  , no asterixis, no tremor, no encephalopathy    Laboratory:                            13.8   10.43 )-----------( 171      ( 02 Nov 2021 09:21 )             40.6     11-02    139  |  102  |  19  ----------------------------<  182<H>  3.6   |  24  |  0.70    Ca    9.6      02 Nov 2021 09:21  Phos  1.4     11-02  Mg     2.20     11-02    TPro  8.1  /  Alb  4.2  /  TBili  2.7<H>  /  DBili  1.4<H>  /  AST  331<H>  /  ALT  179<H>  /  AlkPhos  206<H>  11-02    LIVER FUNCTIONS - ( 02 Nov 2021 09:24 )  Alb: 4.2 g/dL / Pro: 8.1 g/dL / ALK PHOS: 206 U/L / ALT: 179 U/L / AST: 331 U/L / GGT: x               Amylase Serum34      Lipase serum26       Ammonia--    Imaging:      < from: US Abdomen Upper Quadrant Right (11.02.21 @ 08:10) >    EXAM:  US ABDOMEN RT UPR QUADRANT        PROCEDURE DATE:  Nov 2 2021         INTERPRETATION:  CLINICAL INFORMATION: Epigastric and right upper quadrant pain with tenderness. Dementia.    COMPARISON: None available.    TECHNIQUE: Sonography of the right upper quadrant.    FINDINGS:    Liver: Within normal limits.  Bile ducts: Normal caliber. Common bile duct measures 6 mm.  Gallbladder: Mildly distended with gallbladder sludge. No shadowing gallstone, pericholecystic fluid or hyperemia.  Pancreas: Poorly visualized.  Right kidney: 10.7 cm. No hydronephrosis. Within normal limits  Ascites: None.  IVC: Visualized portions are within normal limits.    IMPRESSION:    Mildly distended gallbladder with gallbladder sludge. No secondary sonographicsigns to suggest acute cholecystitis.    Nonvisualization of the pancreas.    --- End of Report ---            HUY PEACOCK MD; Fellow Radiology  This document has been electronically signed.  KIRIT RUSSELL MD; Attending Radiologist  This document has been electronically signed. Nov 2 2021  9:52AM    < end of copied text >      
Nishant Bee MD  Interventional Cardiology / Advance Heart Failure and Cardiac Transplant Specialist  [place] Office : [phone] [place] [address] [place] [address]  Tel:   [place] Office : [address] [facility] [place] [address]  Tel: [phone]  Cell : [phone]    HISTORY OF PRESENTING ILLNESS:  80M with PMHx Alzheimers dementia, sinus node dysfunction s/p PPM, HTN, pre-DM, severe AS, presenting with chest pain x4 days. Patient is a limited historian 2/2 dementia and cannot recall why he was brought to the hospital and does not know he is in the hospital. Oriented only to name. He often answers questions tangentially as well. When asked pointed questions about chest pain he endorses mild, left sided chest pressure with radiation to left arm that self-resolved after 30 minutes earlier this morning. Collateral obtained by grandsammy Bonner. Patient lives with grandson who primarily takes care of him. Patient has had 4 days of intermittent chest pain ranging from left to right side. Today he saw him clenching his chest and it was more persistent. He is unsure if related to exertion but believes it might be. He also had an episode of constipation followed by diarrhea two days ago but now resolved. No SOB, fevers, cough. Of note patient has been difficult to get to medical appts. He was supposed to get a TAVR as outpatient after his admission in April with PPM for sinus node dysfunction. However, patient has not been able to get done. 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 The grandson was told of open heart surgery needed but is unsure specifically what kind.    MEDICATIONS:  amLODIPine   Tablet 5 milliGRAM(s) Oral daily  aspirin  chewable 81 milliGRAM(s) Oral daily  enoxaparin Injectable 40 milliGRAM(s) SubCutaneous daily  metoprolol tartrate 25 milliGRAM(s) Oral two times a day        acetaminophen     Tablet .. 650 milliGRAM(s) Oral every 6 hours PRN    pantoprazole    Tablet 40 milliGRAM(s) Oral before breakfast    atorvastatin 40 milliGRAM(s) Oral at bedtime        PAST MEDICAL/SURGICAL HISTORY  PAST MEDICAL & SURGICAL HISTORY:  Alzheimer&#x27;s disease    HTN (hypertension)    Prediabetes    Severe aortic stenosis    Pacemaker        SOCIAL HISTORY: Substance Use (street drugs): ( x ) never used  (  ) other:    FAMILY HISTORY:  No pertinent family history in first degree relatives        REVIEW OF SYSTEMS:  CONSTITUTIONAL: No fever, weight loss, or fatigue  EYES: No eye pain, visual disturbances, or discharge  ENMT:  No difficulty hearing, tinnitus, vertigo; No sinus or throat pain  BREASTS: No pain, masses, or nipple discharge  GASTROINTESTINAL: No abdominal or epigastric pain. No nausea, vomiting, or hematemesis; No diarrhea or constipation. No melena or hematochezia.  GENITOURINARY: No dysuria, frequency, hematuria, or incontinence  NEUROLOGICAL: No headaches, memory loss, loss of strength, numbness, or tremors  ENDOCRINE: No heat or cold intolerance; No hair loss  MUSCULOSKELETAL: No joint pain or swelling; No muscle, back, or extremity pain  PSYCHIATRIC: No depression, anxiety, mood swings, or difficulty sleeping  HEME/LYMPH: No easy bruising, or bleeding gums  All others negative    PHYSICAL EXAM:  T(C): 36.8 (11-01-21 @ 11:40), Max: 37.4 (10-31-21 @ 23:17)  HR: 80 (11-01-21 @ 11:40) (71 - 95)  BP: 114/75 (11-01-21 @ 11:40) (100/61 - 162/82)  RR: 18 (11-01-21 @ 11:40) (17 - 19)  SpO2: 100% (11-01-21 @ 11:40) (99% - 100%)  Wt(kg): --  I&O's Summary    Height (cm): 165.1 (10-31 @ 14:34)    GENERAL: NAD  EYES: EOMI, PERRLA, conjunctiva and sclera clear  ENMT: No tonsillar erythema, exudates, or enlargement  Cardiovascular: Normal S1 S2, No JVD, No murmurs, No edema  Respiratory: Lungs clear to auscultation	  Gastrointestinal:  Soft, Non-tender, + BS	  Extremities: No edema                                      14.0   9.60  )-----------( 179      ( 31 Oct 2021 17:38 )             40.7     10-31    139  |  101  |  16  ----------------------------<  118<H>  3.7   |  26  |  0.83    Ca    9.7      31 Oct 2021 17:38    TPro  7.9  /  Alb  4.5  /  TBili  1.8<H>  /  DBili  x   /  AST  19  /  ALT  20  /  AlkPhos  61  10-31    proBNP: Serum Pro-Brain Natriuretic Peptide: 139 pg/mL (10-31 @ 22:43)    Lipid Profile:   HgA1c:   TSH: Thyroid Stimulating Hormone, Serum: 1.78 uIU/mL (10-31 @ 22:43)      Consultant(s) Notes Reviewed:  [x ] YES  [ ] NO    Care Discussed with Consultants/Other Providers [ x] YES  [ ] NO    Imaging Personally Reviewed independently:  [x] YES  [ ] NO    All labs, radiologic studies, vitals, orders and medications list reviewed. Patient is seen and examined at bedside. Case discussed with medical team.        
[facility], Division of Infectious Diseases  SHAYY Kate, VERONICA Richardson  [phone]  ([phone] - weekdays after 5pm and weekends)    RAMOS, LEOPOLDO  80y, Male  [id number]    HPI--  HPI:  80M with PMHx Alzheimers dementia, sinus node dysfunction s/p PPM, HTN, pre-DM, severe AS, presenting with chest pain x4 days. Patient is a limited historian 2/2 dementia and cannot recall why he was brought to the hospital and does not know he is in the hospital. Oriented only to name. He often answers questions tangentially as well. When asked pointed questions about chest pain he endorses mild, left sided chest pressure with radiation to left arm that self-resolved after 30 minutes earlier this morning. Collateral obtained by grandson Maged Bonner. Patient lives with grandson who primarily takes care of him. Patient has had 4 days of intermittent chest pain ranging from left to right side. Today he saw him clenching his chest and it was more persistent. He is unsure if related to exertion but believes it might be. He also had an episode of constipation followed by diarrhea two days ago but now resolved. No SOB, fevers, cough. Of note patient has been difficult to get to medical appts. He was supposed to get a TAVR as outpatient after his admission in April with PPM for sinus node dysfunction. However, patient has not been able to get done. 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 The grandson was told of open heart surgery needed but is unsure specifically what kind. Pt follows with Dr. Jimenez (cardiologist) (31 Oct 2021 21:46)  Hospital course c/b increasing liver enzymes and bilirubin s/p ERCP found to have filling defect consistent with a stone on the cholangiogram s/p removal s/p biliary sphincterotomy 11/4. He was started on zosyn on 11/2. ID is consulted for antibiotic recommendations and duration.    ROS: 10 point review of systems completed, pertinent positives and negatives as per HPI.    Allergies: No Known Allergies    PMH -- Alzheimer&#x27;s disease    HTN (hypertension)    Prediabetes    Severe aortic stenosis      PSH -- Pacemaker      FH -- No pertinent family history in first degree relatives      Social History -- denies tobacco, alcohol or illicit drug use  Travel/Environmental/Occupational History:     Physical Exam--  Vital Signs Last 24 Hrs  T(F): 97.6 (06 Nov 2021 12:53), Max: 98.6 (06 Nov 2021 05:30)  HR: 66 (06 Nov 2021 12:53) (65 - 78)  BP: 102/57 (06 Nov 2021 12:53) (102/57 - 125/95)  RR: 18 (06 Nov 2021 12:08) (18 - 18)  SpO2: 95% (06 Nov 2021 12:08) (95% - 100%)  General: nontoxic-appearing, no acute distress  HEENT: NC/AT, anicteric, neck supple  Neck: Not rigid. No LAD  Lungs: Clear bilaterally   Heart: Regular rate and rhythm. No murmur  Abdomen: Soft. Nondistended. Nontender  Back: No costovertebral angle tenderness.  Extremities: No edema.   Skin: Warm. Dry. Good turgor. No rash.   Psychiatric: pleasant  Lines:    Laboratory & Imaging Data--  CBC:                       13.9   9.96  )-----------( 196      ( 06 Nov 2021 08:09 )             41.1     WBC Count: 9.96 K/uL (11-06-21 @ 08:09)  WBC Count: 9.06 K/uL (11-05-21 @ 06:41)  WBC Count: 12.42 K/uL (11-04-21 @ 07:07)  WBC Count: 10.00 K/uL (11-03-21 @ 07:32)  WBC Count: 10.43 K/uL (11-02-21 @ 09:21)  WBC Count: 9.60 K/uL (10-31-21 @ 17:38)    CMP: 11-06    134<L>  |  99  |  9   ----------------------------<  91  3.1<L>   |  23  |  0.76    Ca    9.3      06 Nov 2021 08:09  Phos  1.4     11-05  Mg     2.30     11-05    TPro  7.4  /  Alb  3.5  /  TBili  2.5<H>  /  DBili  x   /  AST  163<H>  /  ALT  291<H>  /  AlkPhos  260<H>  11-06    LIVER FUNCTIONS - ( 06 Nov 2021 08:09 )  Alb: 3.5 g/dL / Pro: 7.4 g/dL / ALK PHOS: 260 U/L / ALT: 291 U/L / AST: 163 U/L / GGT: x             Microbiology:     Culture - Blood (collected 11-02-21 @ 22:54)  Source: .Blood Blood-Venous  Preliminary Report (11-03-21 @ 23:01):    No growth to date.    Culture - Blood (collected 11-02-21 @ 22:54)  Source: .Blood Blood  Preliminary Report (11-03-21 @ 23:01):    No growth to date.      Radiology--    Active Medications--  acetaminophen     Tablet .. 650 milliGRAM(s) Oral every 6 hours PRN  amLODIPine   Tablet 5 milliGRAM(s) Oral daily  aspirin  chewable 81 milliGRAM(s) Oral daily  haloperidol    Injectable 1 milliGRAM(s) IV Push once  metoprolol tartrate 25 milliGRAM(s) Oral two times a day  ondansetron Injectable 4 milliGRAM(s) IV Push three times a day PRN  pantoprazole    Tablet 40 milliGRAM(s) Oral before breakfast  piperacillin/tazobactam IVPB.. 3.375 Gram(s) IV Intermittent every 8 hours  polyethylene glycol 3350 17 Gram(s) Oral daily  potassium chloride    Tablet ER 40 milliEquivalent(s) Oral every 4 hours  potassium phosphate / sodium phosphate Powder (PHOS-NaK) 1 Packet(s) Oral three times a day  senna 2 Tablet(s) Oral at bedtime    Antimicrobials:   piperacillin/tazobactam IVPB.. 3.375 Gram(s) IV Intermittent every 8 hours    Immunologic:   
HPI  81yo M w/ hx Alzheimers dementia (a&ox1), sinus node dysfunction s/p PPM, HTN, pre-DM, severe AS (was pending TAVR in April), now presenting with chest pain x4 days. Patient is a limited historian 2/2 dementia and is unable to answer questions regarding symptoms. When asked, he said he believes he had pain somewhere in chest/arms prior to admission. Pt pt's grandson (primary caretaker), he was brought in d/t severe chest pain. Other than that he states patient had been at baseline health, with no additional complaints. He was tolerating PO, no fever, vomiting or changes in bowel function. Pt admitted for cardiac workup, which is currently unremarkable. Today he had a temp of 102 and per primary team seemed to be having epigastric pain in setting of elevated T.bili and transaminitis. A RUQ US was obtained showing a mildly distended with sludge - no stones, wall thickening or  pericholecystic fluid. CBD wnl. No leucocytosis.     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 The grandson was told of open heart surgery needed but is unsure specifically what kind. Pt follows with Dr. Jimenez (cardiologist). Cardiology workup pending echo.       Home meds confirmed with granddaughter Reanna Bonner  Metoprolol 25mg BID  Pantoprazole 40mg qd  Atorvastatin 40mg qhs  Amlodipine 5mg qd  ASA 81mg qd (31 Oct 2021 21:46)      PAST MEDICAL & SURGICAL HISTORY:  Alzheimer&#x27;s disease    HTN (hypertension)    Prediabetes    Severe aortic stenosis    Pacemaker        ROS: Negative except for HPI    MEDICATIONS:  aspirin  chewable 81 milliGRAM(s) Oral daily  enoxaparin Injectable 40 milliGRAM(s) SubCutaneous daily      amLODIPine   Tablet 5 milliGRAM(s) Oral daily  metoprolol tartrate 25 milliGRAM(s) Oral two times a day      acetaminophen     Tablet .. 650 milliGRAM(s) Oral every 6 hours PRN  atorvastatin 40 milliGRAM(s) Oral at bedtime  haloperidol    Injectable 1 milliGRAM(s) IV Push once  ondansetron Injectable 4 milliGRAM(s) IV Push three times a day PRN  pantoprazole    Tablet 40 milliGRAM(s) Oral before breakfast  piperacillin/tazobactam IVPB.. 3.375 Gram(s) IV Intermittent every 8 hours  polyethylene glycol 3350 17 Gram(s) Oral daily  senna 2 Tablet(s) Oral at bedtime      Allergies    No Known Allergies    Intolerances        SOCIAL HISTORY: Denies tobacco, social ETOH, denies illicit drug use    FAMILY HISTORY:  No pertinent family history in first degree relatives        Vital Signs Last 24 Hrs  T(C): 36.9 (02 Nov 2021 21:05), Max: 38.9 (02 Nov 2021 17:25)  T(F): 98.5 (02 Nov 2021 21:05), Max: 102 (02 Nov 2021 17:25)  HR: 71 (02 Nov 2021 21:05) (61 - 88)  BP: 116/53 (02 Nov 2021 21:05) (106/52 - 130/68)  BP(mean): --  RR: 18 (02 Nov 2021 21:05) (17 - 18)  SpO2: 98% (02 Nov 2021 21:05) (98% - 100%)    PHYSICAL EXAM:    Constitutional: NAD  HEENT: PERRLA, EOMI  Respiratory: Unlabored respirations, equal chest rise.   Cardiovascular: RRR  Gastrointestinal: soft, non-tender, non-distended. No rebound or guarding.   Vascular: 2+ peripheral pulses  Neurological: A/O x 1    LABS:                        13.8   10.43 )-----------( 171      ( 02 Nov 2021 09:21 )             40.6     11-02    139  |  102  |  19  ----------------------------<  182<H>  3.6   |  24  |  0.70    Ca    9.6      02 Nov 2021 09:21  Phos  1.4     11-02  Mg     2.20     11-02    TPro  8.1  /  Alb  4.2  /  TBili  2.7<H>  /  DBili  1.4<H>  /  AST  331<H>  /  ALT  179<H>  /  AlkPhos  206<H>  11-02      RADIOLOGY & ADDITIONAL STUDIES:      EXAM:  US ABDOMEN RT UPR QUADRANT        PROCEDURE DATE:  Nov 2 2021         INTERPRETATION:  CLINICAL INFORMATION: Epigastric and right upper quadrant pain with tenderness. Dementia.    COMPARISON: None available.    TECHNIQUE: Sonography of the right upper quadrant.    FINDINGS:    Liver: Within normal limits.  Bile ducts: Normal caliber. Common bile duct measures 6 mm.  Gallbladder: Mildly distended with gallbladder sludge. No shadowing gallstone, pericholecystic fluid or hyperemia.  Pancreas: Poorly visualized.  Right kidney: 10.7 cm. No hydronephrosis. Within normal limits  Ascites: None.  IVC: Visualized portions are within normal limits.    IMPRESSION:    Mildly distended gallbladder with gallbladder sludge. No secondary sonographic signs to suggest acute cholecystitis.    Nonvisualization of the pancreas.    
[facility] Geriatric[facility] and Palliative [facility]  Kami Small, Palliative Care Attending  Contact Info: Page [phone] (including Nights/Weekends), message on Microsoft Teams (Kami Samll), or leave VM at Palliative Office [phone] (non-urgent)     HPI:  80M with PMHx Alzheimers dementia, sinus node dysfunction s/p PPM, HTN, pre-DM, severe AS, presenting with chest pain x4 days. Patient is a limited historian 2/2 dementia and cannot recall why he was brought to the hospital and does not know he is in the hospital. Oriented only to name. He often answers questions tangentially as well. When asked pointed questions about chest pain he endorses mild, left sided chest pressure with radiation to left arm that self-resolved after 30 minutes earlier this morning. Collateral obtained by grandson Maged Bonner. Patient lives with grandson who primarily takes care of him. Patient has had 4 days of intermittent chest pain ranging from left to right side. Today he saw him clenching his chest and it was more persistent. He is unsure if related to exertion but believes it might be. He also had an episode of constipation followed by diarrhea two days ago but now resolved. No SOB, fevers, cough. Of note patient has been difficult to get to medical appts. He was supposed to get a TAVR as outpatient after his admission in April with PPM for sinus node dysfunction. However, patient has not been able to get done. 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 The grandson was told of open heart surgery needed but is unsure specifically what kind. Pt follows with Dr. Jimenez (cardiologist)    [facility] discussion:  On chart review, patient had a MOLST completed in 2021 (on alpha in [place] visit history) by Kirsten Ruano (daughter) who was designated as surrogate due to patient's dementia and made patient DNR/DNI. Attempts to call daughter at number provided on MOLST were unsuccessful and unable to confirm if this are her wishes. According to the grandson, the daughter (who is his mother) is now estranged to the family and lives in [place]. She is currently hospitalized with COVID and unreachable. The granddaughter Reanna Bonner is not able to reach her at the same number provided in the chart as well and has been out of contact. The patient has other children but the grandchildren do not have their phone numbers and report that they are not involved in his life. They also haven't seen them for ~20 years. The patient currently lives with the grandson who has been primarily taking care of him since July. Patient has dementia and lacks capacity to make his own medical decisions. He does not explain back and is disoriented. He is tangential in his thought process. Both the grandson Maged and granddaughter Reanna would like to discuss GOC/code status between each other and will further inform providers of their wishes tomorrow. They believe his wishes were likely to be DNR/DNI    The daughter Kirsten who was noted to be a surrogate on a prior admission is now estranged to the family. The son believes the daughter (who is his mother) is in [place] hospitalized from COVID. Attempts to reach her with prior contact info were unsuccessful.      Home meds confirmed with granddaamanda Bonner  Metoprolol 25mg BID  Pantoprazole 40mg qd  Atorvastatin 40mg qhs  Amlodipine 5mg qd  ASA 81mg qd (31 Oct 2021 21:46)    PERTINENT PM/SXH:   Alzheimer&#x27;s disease    HTN (hypertension)    Prediabetes    Severe aortic stenosis      Pacemaker      FAMILY HISTORY:  No pertinent family history in first degree relatives      ITEMS NOT CHECKED ARE NOT PRESENT    SOCIAL HISTORY:   Significant other/partner[ ]  Children[ x- Nkechi (daughter), grandchildren]  Muslim/Spirituality: Unknown   Substance hx:  [ ]   Tobacco hx:  [ ]   Alcohol hx: [ ]   Home Opioid hx: None [x ] I-Stop Reference No: [id number]  Living Situation: [x ]Home  [ ]Long term care  [ ]Rehab [ ]Other    ADVANCE DIRECTIVES:  Patient had previous GOC discussion 2021 where daughter, Nkechi, decided to designate patient as DNR/DNI. I reviewed alpha tab, unable to find a copy of MOLST form.   MOLST  [ ]  Living Will  [ ]   DECISION MAKER(s):  [ ] Health Care Proxy(s)  [x ] Surrogate(s)  [ ] Guardian           Name(s): Nkechi Ruano (daughter), Grandchildren- Wilmer Phone Number(s): Nkechi [phone], Shauna [phone], Reanna [phone]    BASELINE (I)ADL(s) (prior to admission): Patient has home health aid to remind him to take meds, feed him   [place]ler: [ ]Total  [x ] Moderate [ ]Dependent    Allergies    No Known Allergies    Intolerances    MEDICATIONS  (STANDING):  amLODIPine   Tablet 5 milliGRAM(s) Oral daily  aspirin  chewable 81 milliGRAM(s) Oral daily  atorvastatin 40 milliGRAM(s) Oral at bedtime  enoxaparin Injectable 40 milliGRAM(s) SubCutaneous daily  metoprolol tartrate 25 milliGRAM(s) Oral two times a day  pantoprazole    Tablet 40 milliGRAM(s) Oral before breakfast    MEDICATIONS  (PRN):  acetaminophen     Tablet .. 650 milliGRAM(s) Oral every 6 hours PRN Temp greater or equal to 38C (100.4F), Mild Pain (1 - 3)    PRESENT SYMPTOMS: [ ]Unable to obtain due to poor mentation   Source if other than patient:  [ ]Family   [ ]Team     Pain: [ ]yes [ x]no  QOL impact -   Location -                    Aggravating factors -  Quality -  Radiation -  Timing-  Severity (0-10 scale):  Minimal acceptable level (0-10 scale):     PAIN AD Score:     http://geriatrictoolkit.[facility]/cog/painad.pdf (press ctrl +  left click to view)    Dyspnea:                           [ ]Mild [ ]Moderate [ ]Severe  Anxiety:                             [ ]Mild [ ]Moderate [ ]Severe  Fatigue:                             [ ]Mild [ ]Moderate [ ]Severe  Nausea:                             [ ]Mild [ ]Moderate [ ]Severe  Loss of appetite:              [ ]Mild [ ]Moderate [ ]Severe  Constipation:                    [ ]Mild [ ]Moderate [ ]Severe    Other Symptoms:  [x ]All other review of systems negative     Palliative Performance Status Version 2:      60   %    http://npcrc.org/files/news/palliative_performance_scale_ppsv2.pdf  PHYSICAL EXAM:  Vital Signs Last 24 Hrs  T(C): 36.8 (2021 11:40), Max: 37.4 (31 Oct 2021 23:17)  T(F): 98.2 (2021 11:40), Max: 99.3 (31 Oct 2021 23:17)  HR: 80 (2021 11:40) (71 - 94)  BP: 114/75 (2021 11:40) (100/61 - 151/80)  BP(mean): 67 (31 Oct 2021 20:38) (67 - 67)  RR: 18 (2021 11:40) (17 - 19)  SpO2: 100% (2021 11:40) (99% - 100%) I&O's Summary    GENERAL:   [x ]Alert  [x ]Oriented x 1 (knows his name, unable to tell me his  or location or current date)   [ ]Lethargic  [ ]Cachexia  [ ]Unarousable  [x ]Verbal  [ ]Non-Verbal  Behavioral:   [ ] Anxiety  [ ] Delirium [ ] Agitation [x ] Other- Pleasant but demented   HEENT: Missing teeth   [ ]Normal   [ ]Dry mouth   [ ]ET Tube/Trach  [ ]Oral lesions  PULMONARY:   [ x]Clear [ ]Tachypnea  [ ]Audible excessive secretions   [ ]Rhonchi        [ ]Right [ ]Left [ ]Bilateral  [ ]Crackles        [ ]Right [ ]Left [ ]Bilateral  [ ]Wheezing     [ ]Right [ ]Left [ ]Bilateral   [ ]Diminished breath sounds [ ]right [ ]left [ ]bilateral  CARDIOVASCULAR:    [ x]Regular [ ]Irregular [ ]Tachy  [ ]Dionicio [ ]Murmur [ ]Other  GASTROINTESTINAL:  [x ]Soft  [ ]Distended   [ ]+BS  [x ]Non tender [ ]Tender  [ ]PEG [ ]OGT/ NGT  Last BM: unknown   GENITOURINARY:  [x ]Normal [ ] Incontinent   [ ]Oliguria/Anuria   [ ]Aleman  MUSCULOSKELETAL:   [ x]Normal   [ ]Weakness  [ ]Bed/Wheelchair bound [ ]Edema  NEUROLOGIC:   [ ]No focal deficits  [x ]Cognitive impairment  [ ]Dysphagia [ ]Dysarthria [ ]Paresis [x ]Other - Pt pleasantly demented   SKIN: please see nursing assessment   [x ]Normal    [ ]Rash  [ ]Pressure ulcer(s)       Present on admission [ ]y [ ]n    CRITICAL CARE:  [ ] Shock Present  [ ]Septic [ ]Cardiogenic [ ]Neurologic [ ]Hypovolemic  [ ]  Vasopressors [ ]  Inotropes   [ ]Respiratory failure present [ ]Mechanical ventilation [ ]Non-invasive ventilatory support [ ]High flow  [ ]Acute  [ ]Chronic [ ]Hypoxic  [ ]Hypercarbic [ ]Other  [ ]Other organ failure     LABS:                        14.0   9.60  )-----------( 179      ( 31 Oct 2021 17:38 )             40.7   10-    139  |  101  |  16  ----------------------------<  118<H>  3.7   |  26  |  0.83    Ca    9.7      31 Oct 2021 17:38    TPro  7.9  /  Alb  4.5  /  TBili  1.8<H>  /  DBili  x   /  AST  19  /  ALT  20  /  AlkPhos  61  10-31        RADIOLOGY & ADDITIONAL STUDIES: < from: Xray Chest 2 Views PA/Lat (10.31.21 @ 17:41) >  IMPRESSION:  Left chest wall cardiac pacemaker generator obscures part of left lung on the PA image. The visualized lungs are clear.    --- End of Report ---    < end of copied text >      PROTEIN CALORIE MALNUTRITION PRESENT: [ ]mild [ ]moderate [ ]severe [ ]underweight [ ]morbid obesity  https://www.andeal.org/vault/2440/web/files/ONC/Table_Clinical%20Characteristics%20to%20Document%20Malnutrition-White%20JV%20et%20al%2020.pdf    Height (cm): 165.1 (10-31-21 @ 14:34), 165.1 (21 @ 19:36), 152.4 (21 @ 16:22)  Weight (kg): 59.8 (21 @ 19:36)  BMI (kg/m2): 21.9 (10-31-21 @ 14:34), 21.9 (21 @ 19:36)    [ ]PPSV2 < or = to 30% [ ]significant weight loss  [ ]poor nutritional intake  [ ]anasarca      [ ]Artificial Nutrition      REFERRALS:   [ ]Chaplaincy  [ ]Hospice  [ ]Child Life  [ ]Social Work  [ ]Case management [ ]Holistic Therapy

## 2021-11-06 NOTE — CONSULT NOTE ADULT - REASON FOR ADMISSION
chest pain x4 days

## 2021-11-07 LAB
ANION GAP SERPL CALC-SCNC: 13 MMOL/L — SIGNIFICANT CHANGE UP (ref 7–14)
BUN SERPL-MCNC: 12 MG/DL — SIGNIFICANT CHANGE UP (ref 7–23)
CALCIUM SERPL-MCNC: 9.4 MG/DL — SIGNIFICANT CHANGE UP (ref 8.4–10.5)
CHLORIDE SERPL-SCNC: 102 MMOL/L — SIGNIFICANT CHANGE UP (ref 98–107)
CO2 SERPL-SCNC: 23 MMOL/L — SIGNIFICANT CHANGE UP (ref 22–31)
CREAT SERPL-MCNC: 0.78 MG/DL — SIGNIFICANT CHANGE UP (ref 0.5–1.3)
CULTURE RESULTS: SIGNIFICANT CHANGE UP
CULTURE RESULTS: SIGNIFICANT CHANGE UP
GLUCOSE SERPL-MCNC: 92 MG/DL — SIGNIFICANT CHANGE UP (ref 70–99)
MAGNESIUM SERPL-MCNC: 2.3 MG/DL — SIGNIFICANT CHANGE UP (ref 1.6–2.6)
PHOSPHATE SERPL-MCNC: 3.1 MG/DL — SIGNIFICANT CHANGE UP (ref 2.5–4.5)
POTASSIUM SERPL-MCNC: 4.1 MMOL/L — SIGNIFICANT CHANGE UP (ref 3.5–5.3)
POTASSIUM SERPL-SCNC: 4.1 MMOL/L — SIGNIFICANT CHANGE UP (ref 3.5–5.3)
SODIUM SERPL-SCNC: 138 MMOL/L — SIGNIFICANT CHANGE UP (ref 135–145)
SPECIMEN SOURCE: SIGNIFICANT CHANGE UP
SPECIMEN SOURCE: SIGNIFICANT CHANGE UP

## 2021-11-07 RX ADMIN — Medication 81 MILLIGRAM(S): at 13:53

## 2021-11-07 RX ADMIN — Medication 25 MILLIGRAM(S): at 05:50

## 2021-11-07 RX ADMIN — PIPERACILLIN AND TAZOBACTAM 25 GRAM(S): 4; .5 INJECTION, POWDER, LYOPHILIZED, FOR SOLUTION INTRAVENOUS at 02:41

## 2021-11-07 RX ADMIN — AMLODIPINE BESYLATE 5 MILLIGRAM(S): 2.5 TABLET ORAL at 05:50

## 2021-11-07 RX ADMIN — Medication 1 PACKET(S): at 13:52

## 2021-11-07 RX ADMIN — PIPERACILLIN AND TAZOBACTAM 25 GRAM(S): 4; .5 INJECTION, POWDER, LYOPHILIZED, FOR SOLUTION INTRAVENOUS at 09:46

## 2021-11-07 RX ADMIN — POLYETHYLENE GLYCOL 3350 17 GRAM(S): 17 POWDER, FOR SOLUTION ORAL at 13:52

## 2021-11-07 RX ADMIN — PIPERACILLIN AND TAZOBACTAM 25 GRAM(S): 4; .5 INJECTION, POWDER, LYOPHILIZED, FOR SOLUTION INTRAVENOUS at 17:55

## 2021-11-07 RX ADMIN — PANTOPRAZOLE SODIUM 40 MILLIGRAM(S): 20 TABLET, DELAYED RELEASE ORAL at 05:50

## 2021-11-07 RX ADMIN — Medication 1 PACKET(S): at 05:49

## 2021-11-07 NOTE — PROGRESS NOTE ADULT - ASSESSMENT
80M with PMHx Alzheimers dementia, sinus node dysfunction s/p PPM, HTN, pre-DM, severe AS, presenting with chest pain x4 days     abd pain/ leukocytosis  found to have increasing liver enzymes and bilirubin   s/p hepatic US w/ Mildly distended gallbladder with gallbladder sludge  s/p CT abd/pelvis w/ Minimal gallbladder wall edema with question area of wall irregularity  He was started on zosyn on 11/2  s/p ERCP found to have filling defect consistent with a stone on the cholangiogram s/p removal s/p biliary sphincterotomy 11/4  liver enzymes improving  f/u blood cx 11/2- NGTD  c/w zosyn to complete 5 day course from date of ERCP (through 11/9)  If discharged prior to end date can transition to cefpodoxime 200mg bid and flagyl 500mg every 8 hours to complete above course    chest pain   s/p TTE w/ severe AS  cardiology following    Joselo Baumann M.D.  [phone]  [facility], Division of Infectious Diseases

## 2021-11-07 NOTE — PROGRESS NOTE ADULT - ASSESSMENT
80M with PMHx Alzheimers dementia, sinus node dysfunction s/p PPM, HTN, pre-DM, severe AS, presenting with chest pain and noted with increased liver enzymes     Increased Liver Enzymes   2/2 CBD stone s/p ERCP with biliary sphincterotomy   Trend LFTS; Bilirubin downtrending appropriately   pain control prn   keep stools soft   diet as tolerated   complete Abx through 11/9 per ID recs; input appreciated     Chest Pain   cardiology work up appreciated; not TAVR candidate     I reviewed the overnight course of events on the unit, re-confirming the patient history. I discussed the care with the patient and their family. The plan of care was discussed with the physician assistant and modifications were made to the notation where appropriate. Differential diagnosis and plan of care discussed with patient after the evaluation. Advanced care planning was discussed with patient and family.  Advanced care planning forms were reviewed and discussed.  Risks, benefits and alternatives of gastroenterologic procedures were discussed in detail and all questions were answered. 35 minutes spent on total encounter of which more than fifty percent of the encounter was spent counseling and/or coordinating care by the attending physician.

## 2021-11-07 NOTE — PROGRESS NOTE ADULT - ASSESSMENT
80M with PMHx Alzheimers dementia, sinus node dysfunction s/p PPM, HTN, pre-DM, severe AS, presenting with chest pain x4 days.    EKG: NSR lateral TWI    1. Chest pain  -trops 11-13  -[facility] in 4/2021 with non obstructive CAD   -echo with severe AS, normal LV function, no WMA. mild diastolic dysfunction      2. Aortic Stenosis  -hx of severe AS, planned for TAVR but patient did not follow up, has dementia not a candidate for TAVR    3. HTN  -controlled  -c/w norvasc and metoprolol  -continue to monitor BP     4. ABD Pain  -patient with pain in RUQ, ABD US shows Mildly distended gallbladder with gallbladder sludge.  -overnight with fever and now elevated LFTs  -appreciate GI recs,   -s/p ERCP doing well   -cont abx    5. DVT Prophylaxis   -lovenox subq

## 2021-11-08 ENCOUNTER — TRANSCRIPTION ENCOUNTER (OUTPATIENT)
Age: 81
End: 2021-11-08

## 2021-11-08 LAB
ANION GAP SERPL CALC-SCNC: 13 MMOL/L — SIGNIFICANT CHANGE UP (ref 7–14)
BUN SERPL-MCNC: 16 MG/DL — SIGNIFICANT CHANGE UP (ref 7–23)
CALCIUM SERPL-MCNC: 9.2 MG/DL — SIGNIFICANT CHANGE UP (ref 8.4–10.5)
CHLORIDE SERPL-SCNC: 101 MMOL/L — SIGNIFICANT CHANGE UP (ref 98–107)
CO2 SERPL-SCNC: 23 MMOL/L — SIGNIFICANT CHANGE UP (ref 22–31)
CREAT SERPL-MCNC: 0.88 MG/DL — SIGNIFICANT CHANGE UP (ref 0.5–1.3)
GLUCOSE SERPL-MCNC: 112 MG/DL — HIGH (ref 70–99)
HCT VFR BLD CALC: 40.3 % — SIGNIFICANT CHANGE UP (ref 39–50)
HGB BLD-MCNC: 13.5 G/DL — SIGNIFICANT CHANGE UP (ref 13–17)
MAGNESIUM SERPL-MCNC: 2.3 MG/DL — SIGNIFICANT CHANGE UP (ref 1.6–2.6)
MCHC RBC-ENTMCNC: 31.8 PG — SIGNIFICANT CHANGE UP (ref 27–34)
MCHC RBC-ENTMCNC: 33.5 GM/DL — SIGNIFICANT CHANGE UP (ref 32–36)
MCV RBC AUTO: 95 FL — SIGNIFICANT CHANGE UP (ref 80–100)
NRBC # BLD: 0 /100 WBCS — SIGNIFICANT CHANGE UP
NRBC # FLD: 0 K/UL — SIGNIFICANT CHANGE UP
PHOSPHATE SERPL-MCNC: 2.5 MG/DL — SIGNIFICANT CHANGE UP (ref 2.5–4.5)
PLATELET # BLD AUTO: 223 K/UL — SIGNIFICANT CHANGE UP (ref 150–400)
POTASSIUM SERPL-MCNC: 4.1 MMOL/L — SIGNIFICANT CHANGE UP (ref 3.5–5.3)
POTASSIUM SERPL-SCNC: 4.1 MMOL/L — SIGNIFICANT CHANGE UP (ref 3.5–5.3)
RBC # BLD: 4.24 M/UL — SIGNIFICANT CHANGE UP (ref 4.2–5.8)
RBC # FLD: 13.9 % — SIGNIFICANT CHANGE UP (ref 10.3–14.5)
SODIUM SERPL-SCNC: 137 MMOL/L — SIGNIFICANT CHANGE UP (ref 135–145)
WBC # BLD: 12.94 K/UL — HIGH (ref 3.8–10.5)
WBC # FLD AUTO: 12.94 K/UL — HIGH (ref 3.8–10.5)

## 2021-11-08 RX ADMIN — PIPERACILLIN AND TAZOBACTAM 25 GRAM(S): 4; .5 INJECTION, POWDER, LYOPHILIZED, FOR SOLUTION INTRAVENOUS at 16:55

## 2021-11-08 RX ADMIN — PIPERACILLIN AND TAZOBACTAM 25 GRAM(S): 4; .5 INJECTION, POWDER, LYOPHILIZED, FOR SOLUTION INTRAVENOUS at 08:31

## 2021-11-08 RX ADMIN — PIPERACILLIN AND TAZOBACTAM 25 GRAM(S): 4; .5 INJECTION, POWDER, LYOPHILIZED, FOR SOLUTION INTRAVENOUS at 02:22

## 2021-11-08 RX ADMIN — PANTOPRAZOLE SODIUM 40 MILLIGRAM(S): 20 TABLET, DELAYED RELEASE ORAL at 05:30

## 2021-11-08 RX ADMIN — Medication 25 MILLIGRAM(S): at 17:00

## 2021-11-08 RX ADMIN — Medication 81 MILLIGRAM(S): at 11:03

## 2021-11-08 RX ADMIN — POLYETHYLENE GLYCOL 3350 17 GRAM(S): 17 POWDER, FOR SOLUTION ORAL at 11:03

## 2021-11-08 NOTE — PROGRESS NOTE ADULT - ASSESSMENT
80M with PMHx Alzheimers dementia, sinus node dysfunction s/p PPM, HTN, pre-DM, severe AS, presenting with chest pain x4 days.    Problem/Plan - 1:  ·  Problem: Chest pain.   ·  Plan:Possibly secondary to AS  -monitor on tele  Follow-up echo      Problem/Plan - 2:  ·  Problem: Severe aortic stenosis.   Not a candidate for divers secondary to dementia    Problem/Plan - 3:  ·  Problem: Prediabetes.   Monitor fingersticks    Problem/Plan - 4:  ·  Problem: HTN (hypertension).   Continue current medications    Elevated LFTs: Appreciate GI input  ERCP: Status post  sphincterectomy  And gallstone removal   Continue antibiotics per ID    Cultures negative      ACP: Patient is DNR

## 2021-11-08 NOTE — DISCHARGE NOTE NURSING/CASE MANAGEMENT/SOCIAL WORK - PATIENT PORTAL LINK FT
You can access the FollowMyHealth Patient Portal offered by [facility] by registering at the following website: [email or web address]. By joining [facility]’s FollowMyHealth portal, you will also be able to view your health information using other applications (apps) compatible with our system.

## 2021-11-08 NOTE — PROGRESS NOTE ADULT - ASSESSMENT
80M with PMHx Alzheimers dementia, sinus node dysfunction s/p PPM, HTN, pre-DM, severe AS, presenting with chest pain x4 days.    EKG: NSR lateral TWI    1. Chest pain  -trops 11-13  -[facility] in 4/2021 with non obstructive CAD   -echo with severe AS, normal LV function, no WMA. mild diastolic dysfunction      2. Aortic Stenosis  -hx of severe AS, planned for TAVR but patient did not follow up, has dementia not a candidate for TAVR    3. HTN  -controlled  -c/w norvasc and metoprolol  -continue to monitor BP     4. ABD Pain  -patient with pain in RUQ, ABD US shows Mildly distended gallbladder with gallbladder sludge.  -appreciate GI recs,   -s/p ERCP doing well   -on IV abx  -cont abx    5. DVT Prophylaxis   -lovenox subq

## 2021-11-08 NOTE — PROGRESS NOTE ADULT - ASSESSMENT
80M with PMHx Alzheimers dementia, sinus node dysfunction s/p PPM, HTN, pre-DM, severe AS, presenting with chest pain x4 days     Abd pain/ leukocytosis:    found to have increasing liver enzymes and bilirubin   s/p hepatic US w/ Mildly distended gallbladder with gallbladder sludge  s/p CT abd/pelvis w/ Minimal gallbladder wall edema with question area of wall irregularity  He was started on zosyn on 11/2  s/p ERCP found to have filling defect consistent with a stone on the cholangiogram s/p removal s/p biliary sphincterotomy 11/4  liver enzymes improving  f/u blood cx 11/2- NGTD  c/w zosyn to complete 5 day course from date of ERCP (through 11/9)      chest pain:    s/p TTE w/ severe AS  cardiology following    Nguyen Pina

## 2021-11-09 ENCOUNTER — TRANSCRIPTION ENCOUNTER (OUTPATIENT)
Age: 81
End: 2021-11-09

## 2021-11-09 VITALS
SYSTOLIC BLOOD PRESSURE: 108 MMHG | HEART RATE: 61 BPM | OXYGEN SATURATION: 100 % | TEMPERATURE: 98 F | DIASTOLIC BLOOD PRESSURE: 65 MMHG | RESPIRATION RATE: 17 BRPM

## 2021-11-09 LAB
ANION GAP SERPL CALC-SCNC: 10 MMOL/L — SIGNIFICANT CHANGE UP (ref 7–14)
BUN SERPL-MCNC: 15 MG/DL — SIGNIFICANT CHANGE UP (ref 7–23)
CALCIUM SERPL-MCNC: 9.2 MG/DL — SIGNIFICANT CHANGE UP (ref 8.4–10.5)
CHLORIDE SERPL-SCNC: 102 MMOL/L — SIGNIFICANT CHANGE UP (ref 98–107)
CO2 SERPL-SCNC: 24 MMOL/L — SIGNIFICANT CHANGE UP (ref 22–31)
CREAT SERPL-MCNC: 0.69 MG/DL — SIGNIFICANT CHANGE UP (ref 0.5–1.3)
GLUCOSE SERPL-MCNC: 100 MG/DL — HIGH (ref 70–99)
HCT VFR BLD CALC: 38.7 % — LOW (ref 39–50)
HGB BLD-MCNC: 13 G/DL — SIGNIFICANT CHANGE UP (ref 13–17)
MAGNESIUM SERPL-MCNC: 2.4 MG/DL — SIGNIFICANT CHANGE UP (ref 1.6–2.6)
MCHC RBC-ENTMCNC: 31.3 PG — SIGNIFICANT CHANGE UP (ref 27–34)
MCHC RBC-ENTMCNC: 33.6 GM/DL — SIGNIFICANT CHANGE UP (ref 32–36)
MCV RBC AUTO: 93 FL — SIGNIFICANT CHANGE UP (ref 80–100)
NRBC # BLD: 0 /100 WBCS — SIGNIFICANT CHANGE UP
NRBC # FLD: 0 K/UL — SIGNIFICANT CHANGE UP
PHOSPHATE SERPL-MCNC: 3 MG/DL — SIGNIFICANT CHANGE UP (ref 2.5–4.5)
PLATELET # BLD AUTO: 236 K/UL — SIGNIFICANT CHANGE UP (ref 150–400)
POTASSIUM SERPL-MCNC: 3.9 MMOL/L — SIGNIFICANT CHANGE UP (ref 3.5–5.3)
POTASSIUM SERPL-SCNC: 3.9 MMOL/L — SIGNIFICANT CHANGE UP (ref 3.5–5.3)
RBC # BLD: 4.16 M/UL — LOW (ref 4.2–5.8)
RBC # FLD: 13.6 % — SIGNIFICANT CHANGE UP (ref 10.3–14.5)
SODIUM SERPL-SCNC: 136 MMOL/L — SIGNIFICANT CHANGE UP (ref 135–145)
WBC # BLD: 9.5 K/UL — SIGNIFICANT CHANGE UP (ref 3.8–10.5)
WBC # FLD AUTO: 9.5 K/UL — SIGNIFICANT CHANGE UP (ref 3.8–10.5)

## 2021-11-09 RX ORDER — SENNA PLUS 8.6 MG/1
2 TABLET ORAL
Qty: 60 | Refills: 0
Start: 2021-11-09 | End: 2021-12-08

## 2021-11-09 RX ADMIN — Medication 81 MILLIGRAM(S): at 11:33

## 2021-11-09 RX ADMIN — Medication 25 MILLIGRAM(S): at 08:30

## 2021-11-09 RX ADMIN — POLYETHYLENE GLYCOL 3350 17 GRAM(S): 17 POWDER, FOR SOLUTION ORAL at 11:33

## 2021-11-09 RX ADMIN — PANTOPRAZOLE SODIUM 40 MILLIGRAM(S): 20 TABLET, DELAYED RELEASE ORAL at 06:05

## 2021-11-09 RX ADMIN — PIPERACILLIN AND TAZOBACTAM 25 GRAM(S): 4; .5 INJECTION, POWDER, LYOPHILIZED, FOR SOLUTION INTRAVENOUS at 01:44

## 2021-11-09 RX ADMIN — PIPERACILLIN AND TAZOBACTAM 25 GRAM(S): 4; .5 INJECTION, POWDER, LYOPHILIZED, FOR SOLUTION INTRAVENOUS at 08:30

## 2021-11-09 NOTE — PROGRESS NOTE ADULT - ASSESSMENT
80M with PMHx Alzheimers dementia, sinus node dysfunction s/p PPM, HTN, pre-DM, severe AS, presenting with chest pain x4 days     Abd pain/ leukocytosis:    found to have increasing liver enzymes and bilirubin   s/p hepatic US w/ Mildly distended gallbladder with gallbladder sludge  s/p CT abd/pelvis w/ Minimal gallbladder wall edema with question area of wall irregularity  He was started on zosyn on 11/2  s/p ERCP found to have filling defect consistent with a stone on the cholangiogram s/p removal s/p biliary sphincterotomy 11/4  liver enzymes improving  f/u blood cx 11/2- NGTD  c/w zosyn to complete 5 day course from date of ERCP (through 11/9)      chest pain:    s/p TTE w/ severe AS  cardiology following    Pt remains stable off abx.  No acute ID issues at this time.      Nguyen Pina

## 2021-11-09 NOTE — PROGRESS NOTE ADULT - REASON FOR ADMISSION
chest pain x4 days

## 2021-11-09 NOTE — DISCHARGE NOTE PROVIDER - NSDCMRMEDTOKEN_GEN_ALL_CORE_FT
amLODIPine 5 mg oral tablet: 1 tab(s) orally once a day  aspirin 81 mg oral tablet, chewable: 1 tab(s) orally once a day  metoprolol tartrate 25 mg oral tablet: 1 tab(s) orally 2 times a day  pantoprazole 40 mg oral delayed release tablet: 1 tab(s) orally once a day (before a meal)  senna oral tablet: 2 tab(s) orally once a day (at bedtime)

## 2021-11-09 NOTE — DIETITIAN INITIAL EVALUATION ADULT. - ORAL INTAKE PTA/DIET HISTORY
Contacted pharmacy, spoke to CANELO Osborne.  
Contacted, spoke with Lexi, manager.  Appointment scheduled 11-13-19 at 1300.  
I don't remember seeing a prior request for this.    If she has been taking it all along, then she can continue taking it.  I refilled it for her today.   If she has not been receiving it lately, then she should d/c it.     
[facility] called.  Pt takes Seroquel 50 mg BID and every 6 hours as needed.  Refill sent in for just BID, is PRN portion to be discontinued?  Is potassium discontinued?  
Outpatient Medications Marked as Taking for the 11/1/19 encounter (Refill) with Jay Jim MD   Medication Sig Dispense Refill   • POTASSIUM CHLORIDE PO Take 10 mEq by mouth daily.     • QUEtiapine (SEROQUEL) 50 MG tablet Take 50 mg by mouth 2 times daily. And every 6 hours as needed.          Ok to leave detailed Message: Yes  Informed caller of refill policy- 24-48 hours: Yes  No call back needed unless nurse has questions.     Pharmacy: [facility]     Caller states patient would need this Nov 3 would need a response from dr soon    Caller states has been faxing over request since the 10/22/19  
Seen 05-21-19, establish care, return in 3 months.  NO APPOINTMENT SCHEDULED.  Ordered by outside provider.  Please review and advise.  
She is overdue for recheck with me and labs.     D/c the PRN seroquel.    I refilled the potassium today.   
Limited information derived from pt during assessment as pt is a limited historian in the setting of dementia. Majority of information derived from grandson on call  (Maged [phone]); grandson primary care taker of pt. Per grandson, pt has a fair appetite @home and consumes 2 meals a day. Grandsammy states he hired a private home aide that cooks lunch for pt. Grandsammy reports he cooks for pt on the weekends. Diet recall includes coffee, chicken, vegetables, rice, turkey ham sandwich, pastry

## 2021-11-09 NOTE — PROGRESS NOTE ADULT - NUTRITIONAL ASSESSMENT
This patient has been assessed with a concern for Malnutrition and has been determined to have a diagnosis/diagnoses of Severe protein-calorie malnutrition.    This patient is being managed with:   Diet DASH/TLC-  Sodium & Cholesterol Restricted  Entered: Nov 5 2021  2:02PM

## 2021-11-09 NOTE — DISCHARGE NOTE PROVIDER - DETAILS OF MALNUTRITION DIAGNOSIS/DIAGNOSES
This patient has been assessed with a concern for Malnutrition and was treated during this hospitalization for the following Nutrition diagnosis/diagnoses:     -  11/09/2021: Severe protein-calorie malnutrition

## 2021-11-09 NOTE — DIETITIAN INITIAL EVALUATION ADULT. - PERSON TAUGHT/METHOD
grandson/verbal instruction/written material/son instructed grandson/verbal instruction/written material

## 2021-11-09 NOTE — DISCHARGE NOTE PROVIDER - HOSPITAL COURSE
80M with PMHx Alzheimer dementia, sinus node dysfunction s/p PPM, HTN, pre-DM, severe AS, presenting with chest pain x4 days.    Chest pain:   Possibly secondary to AS    Severe aortic stenosis:   Not a candidate for divers secondary to dementia    Prediabetes:    Monitor fingersticks    HTN (hypertension):   Low sodium and fat diet, continue anti-hypertensive medications, and follow up with primary care physician.    Elevated LFTs:  ERCP: Status post sphincterectomy and gallstone removal  Cultures negative    Code Status: Patient is DNR    11/9: Case discussed with Dr. Bee and Dr. Thorne. Patient is stable for discharge to Home [facility]ospice.

## 2021-11-09 NOTE — PROGRESS NOTE ADULT - ATTENDING COMMENTS

## 2021-11-09 NOTE — PROGRESS NOTE ADULT - SUBJECTIVE AND OBJECTIVE BOX
Date of service: 11-05-21 @ 14:59      Patient is a 80y old  Male who presents with a chief complaint of chest pain x4 days (05 Nov 2021 14:02)                                                               INTERVAL HPI/OVERNIGHT EVENTS:    REVIEW OF SYSTEMS:     CONSTITUTIONAL: No weakness, fevers or chills  EYES/ENT: No visual changes , no ear ache   NECK: No pain or stiffness  RESPIRATORY: No cough, wheezing,  No shortness of breath  CARDIOVASCULAR: No chest pain or palpitations  GASTROINTESTINAL: No abdominal pain  . No nausea, vomiting, or hematemesis; No diarrhea or constipation. No melena or hematochezia.  GENITOURINARY: No dysuria, frequency or hematuria  NEUROLOGICAL: No numbness or weakness  SKIN: No itching, burning, rashes, or lesions                                                                                                                                                                                                                                                                                 Medications:  MEDICATIONS  (STANDING):  amLODIPine   Tablet 5 milliGRAM(s) Oral daily  aspirin  chewable 81 milliGRAM(s) Oral daily  haloperidol    Injectable 1 milliGRAM(s) IV Push once  metoprolol tartrate 25 milliGRAM(s) Oral two times a day  pantoprazole    Tablet 40 milliGRAM(s) Oral before breakfast  piperacillin/tazobactam IVPB.. 3.375 Gram(s) IV Intermittent every 8 hours  polyethylene glycol 3350 17 Gram(s) Oral daily  senna 2 Tablet(s) Oral at bedtime    MEDICATIONS  (PRN):  acetaminophen     Tablet .. 650 milliGRAM(s) Oral every 6 hours PRN Temp greater or equal to 38C (100.4F), Mild Pain (1 - 3)  ondansetron Injectable 4 milliGRAM(s) IV Push three times a day PRN Nausea and/or Vomiting       Allergies    No Known Allergies    Intolerances      Vital Signs Last 24 Hrs  T(C): 36.8 (05 Nov 2021 11:20), Max: 37 (05 Nov 2021 06:02)  T(F): 98.3 (05 Nov 2021 11:20), Max: 98.6 (05 Nov 2021 06:02)  HR: 62 (05 Nov 2021 11:20) (61 - 67)  BP: 104/69 (05 Nov 2021 11:20) (104/62 - 128/60)  BP(mean): --  RR: 18 (05 Nov 2021 11:20) (17 - 18)  SpO2: 100% (05 Nov 2021 11:20) (98% - 110%)  CAPILLARY BLOOD GLUCOSE          Physical Exam:    Daily     Daily   General:  Well appearing, NAD, not cachetic  HEENT:  Nonicteric, PERRLA  CV:  RRR, S1S2   Lungs:  CTA B/L, no wheezes, rales, rhonchi  Abdomen:  Soft, non-tender, no distended, positive BS  Extremities:  2+ pulses, no c/c, no edema  Skin:  Warm and dry, no rashes  :  No duffy  Neuro:  AAOx3, non-focal, grossly intact                                                                                                                                                                                                                                                                                                LABS:                               13.8   9.06  )-----------( 185      ( 05 Nov 2021 06:41 )             38.7                      11-05    138  |  102  |  14  ----------------------------<  114<H>  3.4<L>   |  24  |  0.84    Ca    9.1      05 Nov 2021 06:41  Phos  1.4     11-05  Mg     2.30     11-05    TPro  6.8  /  Alb  3.1<L>  /  TBili  4.1<H>  /  DBili  2.6<H>  /  AST  153<H>  /  ALT  299<H>  /  AlkPhos  218<H>  11-05                       RADIOLOGY & ADDITIONAL TESTS         I personally reviewed: [  ]EKG   [  ]CXR    [  ] CT      A/P:         Discussed with :     [facility] consultants' Notes   Time spent :  
Date of service: 11-07-21 @ 21:25      Patient is a 80y old  Male who presents with a chief complaint of chest pain x4 days (07 Nov 2021 12:38)                                                               INTERVAL HPI/OVERNIGHT EVENTS:    REVIEW OF SYSTEMS:     CONSTITUTIONAL: No weakness, fevers or chills  EYES/ENT: No visual changes , no ear ache   NECK: No pain or stiffness  RESPIRATORY: No cough, wheezing,  No shortness of breath  CARDIOVASCULAR: No chest pain or palpitations  GASTROINTESTINAL: No abdominal pain  . No nausea, vomiting, or hematemesis; No diarrhea or constipation. No melena or hematochezia.  GENITOURINARY: No dysuria, frequency or hematuria  NEUROLOGICAL: No numbness or weakness  SKIN: No itching, burning, rashes, or lesions                                                                                                                                                                                                                                                                                 Medications:  MEDICATIONS  (STANDING):  amLODIPine   Tablet 5 milliGRAM(s) Oral daily  aspirin  chewable 81 milliGRAM(s) Oral daily  haloperidol    Injectable 1 milliGRAM(s) IV Push once  metoprolol tartrate 25 milliGRAM(s) Oral two times a day  pantoprazole    Tablet 40 milliGRAM(s) Oral before breakfast  piperacillin/tazobactam IVPB.. 3.375 Gram(s) IV Intermittent every 8 hours  polyethylene glycol 3350 17 Gram(s) Oral daily  senna 2 Tablet(s) Oral at bedtime    MEDICATIONS  (PRN):  acetaminophen     Tablet .. 650 milliGRAM(s) Oral every 6 hours PRN Temp greater or equal to 38C (100.4F), Mild Pain (1 - 3)  ondansetron Injectable 4 milliGRAM(s) IV Push three times a day PRN Nausea and/or Vomiting       Allergies    No Known Allergies    Intolerances      Vital Signs Last 24 Hrs  T(C): 36.6 (07 Nov 2021 21:02), Max: 37.8 (07 Nov 2021 05:48)  T(F): 97.9 (07 Nov 2021 21:02), Max: 100 (07 Nov 2021 05:48)  HR: 70 (07 Nov 2021 21:02) (52 - 75)  BP: 96/71 (07 Nov 2021 21:02) (96/71 - 138/62)  BP(mean): --  RR: 18 (07 Nov 2021 21:02) (18 - 18)  SpO2: 99% (07 Nov 2021 21:02) (99% - 100%)  CAPILLARY BLOOD GLUCOSE          Physical Exam:    Daily     Daily   General:  Well appearing, NAD, not cachetic  HEENT:  Nonicteric, PERRLA  CV:  RRR, S1S2   Lungs:  CTA B/L, no wheezes, rales, rhonchi  Abdomen:  Soft, non-tender, no distended, positive BS  Extremities:  2+ pulses, no c/c, no edema  Skin:  Warm and dry, no rashes  :  No duffy  Neuro:  AAOx3, non-focal, grossly intact                                                                                                                                                                                                                                                                                                LABS:                               13.9   9.96  )-----------( 196      ( 06 Nov 2021 08:09 )             41.1                      11-07    138  |  102  |  12  ----------------------------<  92  4.1   |  23  |  0.78    Ca    9.4      07 Nov 2021 07:14  Phos  3.1     11-07  Mg     2.30     11-07    TPro  7.3  /  Alb  3.4  /  TBili  2.0<H>  /  DBili  0.9<H>  /  AST  88<H>  /  ALT  207<H>  /  AlkPhos  272<H>  11-07                       RADIOLOGY & ADDITIONAL TESTS         I personally reviewed: [  ]EKG   [  ]CXR    [  ] CT      A/P:         Discussed with :     [facility] consultants' Notes   Time spent :  
Date of service: 11-09-21 @ 20:40      Patient is a 80y old  Male who presents with a chief complaint of chest pain x4 days (09 Nov 2021 16:13)                                                               INTERVAL HPI/OVERNIGHT EVENTS:    REVIEW OF SYSTEMS:     CONSTITUTIONAL: No weakness, fevers or chills  EYES/ENT: No visual changes , no ear ache   NECK: No pain or stiffness  RESPIRATORY: No cough, wheezing,  No shortness of breath  CARDIOVASCULAR: No chest pain or palpitations  GASTROINTESTINAL: No abdominal pain  . No nausea, vomiting, or hematemesis; No diarrhea or constipation. No melena or hematochezia.  GENITOURINARY: No dysuria, frequency or hematuria  NEUROLOGICAL: No numbness or weakness  SKIN: No itching, burning, rashes, or lesions                                                                                                                                                                                                                                                                                 Medications:  MEDICATIONS  (STANDING):  amLODIPine   Tablet 5 milliGRAM(s) Oral daily  aspirin  chewable 81 milliGRAM(s) Oral daily  haloperidol    Injectable 1 milliGRAM(s) IV Push once  metoprolol tartrate 25 milliGRAM(s) Oral two times a day  pantoprazole    Tablet 40 milliGRAM(s) Oral before breakfast  piperacillin/tazobactam IVPB.. 3.375 Gram(s) IV Intermittent every 8 hours  polyethylene glycol 3350 17 Gram(s) Oral daily  senna 2 Tablet(s) Oral at bedtime    MEDICATIONS  (PRN):  acetaminophen     Tablet .. 650 milliGRAM(s) Oral every 6 hours PRN Temp greater or equal to 38C (100.4F), Mild Pain (1 - 3)  ondansetron Injectable 4 milliGRAM(s) IV Push three times a day PRN Nausea and/or Vomiting       Allergies    No Known Allergies    Intolerances      Vital Signs Last 24 Hrs  T(C): 36.4 (09 Nov 2021 11:30), Max: 37 (08 Nov 2021 21:22)  T(F): 97.6 (09 Nov 2021 11:30), Max: 98.6 (08 Nov 2021 21:22)  HR: 61 (09 Nov 2021 11:30) (61 - 80)  BP: 108/65 (09 Nov 2021 11:30) (103/56 - 125/70)  BP(mean): --  RR: 17 (09 Nov 2021 11:30) (17 - 18)  SpO2: 100% (09 Nov 2021 11:30) (99% - 100%)  CAPILLARY BLOOD GLUCOSE          11-08 @ 07:01  -  11-09 @ 07:00  --------------------------------------------------------  IN: 0 mL / OUT: 900 mL / NET: -900 mL    11-09 @ 07:01  -  11-09 @ 20:40  --------------------------------------------------------  IN: 0 mL / OUT: 400 mL / NET: -400 mL      Physical Exam:    Daily     Daily   General:  Well appearing, NAD, not cachetic  HEENT:  Nonicteric, PERRLA  CV:  RRR, S1S2   Lungs:  CTA B/L, no wheezes, rales, rhonchi  Abdomen:  Soft, non-tender, no distended, positive BS  Extremities:  2+ pulses, no c/c, no edema  Skin:  Warm and dry, no rashes  :  No duffy  Neuro:  AAOx3, non-focal, grossly intact                                                                                                                                                                                                                                                                                                LABS:                               13.0   9.50  )-----------( 236      ( 09 Nov 2021 07:33 )             38.7                      11-09    136  |  102  |  15  ----------------------------<  100<H>  3.9   |  24  |  0.69    Ca    9.2      09 Nov 2021 07:33  Phos  3.0     11-09  Mg     2.40     11-09                         RADIOLOGY & ADDITIONAL TESTS         I personally reviewed: [  ]EKG   [  ]CXR    [  ] CT      A/P:         Discussed with :     [facility] consultants' Notes   Time spent :  
INTERVAL HPI/OVERNIGHT EVENTS:    afebrile, resting comfortably; pleasantly confused  bilirubin improving     MEDICATIONS  (STANDING):  amLODIPine   Tablet 5 milliGRAM(s) Oral daily  aspirin  chewable 81 milliGRAM(s) Oral daily  haloperidol    Injectable 1 milliGRAM(s) IV Push once  metoprolol tartrate 25 milliGRAM(s) Oral two times a day  pantoprazole    Tablet 40 milliGRAM(s) Oral before breakfast  piperacillin/tazobactam IVPB.. 3.375 Gram(s) IV Intermittent every 8 hours  polyethylene glycol 3350 17 Gram(s) Oral daily  potassium chloride    Tablet ER 40 milliEquivalent(s) Oral every 4 hours  potassium phosphate / sodium phosphate Powder (PHOS-NaK) 1 Packet(s) Oral three times a day  senna 2 Tablet(s) Oral at bedtime    MEDICATIONS  (PRN):  acetaminophen     Tablet .. 650 milliGRAM(s) Oral every 6 hours PRN Temp greater or equal to 38C (100.4F), Mild Pain (1 - 3)  ondansetron Injectable 4 milliGRAM(s) IV Push three times a day PRN Nausea and/or Vomiting      Allergies    No Known Allergies    Intolerances        Review of Systems:    General:  No wt loss, fevers, chills, night sweats, fatigue   Eyes:  Good vision, no reported pain  ENT:  No sore throat, pain, runny nose, dysphagia  CV:  No pain, palpitations, hypo/hypertension  Resp:  No dyspnea, cough, tachypnea, wheezing  GI:  No pain, No nausea, No vomiting, No diarrhea, No constipation, No weight loss, No fever, No pruritis, No rectal bleeding, No melena, No dysphagia  :  No pain, bleeding, incontinence, nocturia  Muscle:  No pain, weakness  Neuro:  No weakness, tingling, memory problems  Psych:  No fatigue, insomnia, mood problems, depression  Endocrine:  No polyuria, polydypsia, cold/heat intolerance  Heme:  No petechiae, ecchymosis, easy bruisability  Skin:  No rash, tattoos, scars, edema      Vital Signs Last 24 Hrs  T(C): 37 (06 Nov 2021 05:30), Max: 37 (06 Nov 2021 05:30)  T(F): 98.6 (06 Nov 2021 05:30), Max: 98.6 (06 Nov 2021 05:30)  HR: 71 (06 Nov 2021 05:30) (65 - 71)  BP: 121/67 (06 Nov 2021 05:30) (117/65 - 125/95)  BP(mean): --  RR: 18 (06 Nov 2021 05:30) (18 - 18)  SpO2: 100% (06 Nov 2021 05:30) (100% - 100%)    PHYSICAL EXAM:    Constitutional: NAD  HEENT: EOMI, throat clear  Neck: No LAD, supple  Respiratory: CTA and P  Cardiovascular: S1 and S2, RRR, no M  Gastrointestinal: BS+, soft, NT/ND, neg HSM,  Extremities: No peripheral edema, neg clubbing, cyanosis  Vascular: 2+ peripheral pulses  Neurological: A/O x 1, no focal deficits  Psychiatric: Normal mood, normal affect  Skin: No rashes      LABS:                        13.9   9.96  )-----------( 196      ( 06 Nov 2021 08:09 )             41.1     11-06    134<L>  |  99  |  9   ----------------------------<  91  3.1<L>   |  23  |  0.76    Ca    9.3      06 Nov 2021 08:09  Phos  1.4     11-05  Mg     2.30     11-05    TPro  7.4  /  Alb  3.5  /  TBili  2.5<H>  /  DBili  x   /  AST  163<H>  /  ALT  291<H>  /  AlkPhos  260<H>  11-06          RADIOLOGY & ADDITIONAL TESTS:  
INTERVAL HPI/OVERNIGHT EVENTS:    comfortable  tolerating diet; no c/o abd pain or n/v    MEDICATIONS  (STANDING):  amLODIPine   Tablet 5 milliGRAM(s) Oral daily  aspirin  chewable 81 milliGRAM(s) Oral daily  haloperidol    Injectable 1 milliGRAM(s) IV Push once  metoprolol tartrate 25 milliGRAM(s) Oral two times a day  pantoprazole    Tablet 40 milliGRAM(s) Oral before breakfast  piperacillin/tazobactam IVPB.. 3.375 Gram(s) IV Intermittent every 8 hours  polyethylene glycol 3350 17 Gram(s) Oral daily  senna 2 Tablet(s) Oral at bedtime    MEDICATIONS  (PRN):  acetaminophen     Tablet .. 650 milliGRAM(s) Oral every 6 hours PRN Temp greater or equal to 38C (100.4F), Mild Pain (1 - 3)  ondansetron Injectable 4 milliGRAM(s) IV Push three times a day PRN Nausea and/or Vomiting      Allergies    No Known Allergies    Intolerances        Review of Systems:    General:  No wt loss, fevers, chills, night sweats, fatigue   Eyes:  Good vision, no reported pain  ENT:  No sore throat, pain, runny nose, dysphagia  CV:  No pain, palpitations, hypo/hypertension  Resp:  No dyspnea, cough, tachypnea, wheezing  GI:  No pain, No nausea, No vomiting, No diarrhea, No constipation, No weight loss, No fever, No pruritis, No rectal bleeding, No melena, No dysphagia  :  No pain, bleeding, incontinence, nocturia  Muscle:  No pain, weakness  Neuro:  No weakness, tingling, memory problems  Psych:  No fatigue, insomnia, mood problems, depression  Endocrine:  No polyuria, polydypsia, cold/heat intolerance  Heme:  No petechiae, ecchymosis, easy bruisability  Skin:  No rash, tattoos, scars, edema      Vital Signs Last 24 Hrs  T(C): 36.4 (09 Nov 2021 11:30), Max: 37 (08 Nov 2021 21:22)  T(F): 97.6 (09 Nov 2021 11:30), Max: 98.6 (08 Nov 2021 21:22)  HR: 61 (09 Nov 2021 11:30) (61 - 80)  BP: 108/65 (09 Nov 2021 11:30) (103/56 - 126/60)  BP(mean): --  RR: 17 (09 Nov 2021 11:30) (17 - 18)  SpO2: 100% (09 Nov 2021 11:30) (99% - 100%)    PHYSICAL EXAM:    Constitutional: NAD  HEENT: EOMI, throat clear  Neck: No LAD, supple  Respiratory: CTA and P  Cardiovascular: S1 and S2, RRR, no M  Gastrointestinal: BS+, soft, NT/ND, neg HSM,  Extremities: No peripheral edema, neg clubbing, cyanosis  Vascular: 2+ peripheral pulses  Neurological: A/O x 1-2, no focal deficits  Psychiatric: Normal mood, normal affect  Skin: No rashes      LABS:                        13.0   9.50  )-----------( 236      ( 09 Nov 2021 07:33 )             38.7     11-09    136  |  102  |  15  ----------------------------<  100<H>  3.9   |  24  |  0.69    Ca    9.2      09 Nov 2021 07:33  Phos  3.0     11-09  Mg     2.40     11-09            RADIOLOGY & ADDITIONAL TESTS:  
Infectious Diseases progress note:    Subjective:  NAD, afebrile.  Denies abd pain    ROS:  CONSTITUTIONAL:  No fever, chills, rigors  CARDIOVASCULAR:  No chest pain or palpitations  RESPIRATORY:   No SOB, cough, dyspnea on exertion.  No wheezing  GASTROINTESTINAL:  No abd pain, N/V, diarrhea/constipation  EXTREMITIES:  No swelling or joint pain  GENITOURINARY:  No burning on urination, increased frequency or urgency.  No flank pain  NEUROLOGIC:  No HA, visual disturbances  SKIN: No rashes    Allergies    No Known Allergies    Intolerances        ANTIBIOTICS/RELEVANT:  antimicrobials  piperacillin/tazobactam IVPB.. 3.375 Gram(s) IV Intermittent every 8 hours    immunologic:    OTHER:  acetaminophen     Tablet .. 650 milliGRAM(s) Oral every 6 hours PRN  amLODIPine   Tablet 5 milliGRAM(s) Oral daily  aspirin  chewable 81 milliGRAM(s) Oral daily  haloperidol    Injectable 1 milliGRAM(s) IV Push once  metoprolol tartrate 25 milliGRAM(s) Oral two times a day  ondansetron Injectable 4 milliGRAM(s) IV Push three times a day PRN  pantoprazole    Tablet 40 milliGRAM(s) Oral before breakfast  polyethylene glycol 3350 17 Gram(s) Oral daily  senna 2 Tablet(s) Oral at bedtime      Objective:  Vital Signs Last 24 Hrs  T(C): 36.4 (09 Nov 2021 11:30), Max: 37 (08 Nov 2021 21:22)  T(F): 97.6 (09 Nov 2021 11:30), Max: 98.6 (08 Nov 2021 21:22)  HR: 61 (09 Nov 2021 11:30) (61 - 80)  BP: 108/65 (09 Nov 2021 11:30) (103/56 - 125/70)  BP(mean): --  RR: 17 (09 Nov 2021 11:30) (17 - 18)  SpO2: 100% (09 Nov 2021 11:30) (99% - 100%)    PHYSICAL EXAM:  Constitutional:NAD  Eyes:MARCOS, EOMI  Ear/Nose/Throat: no thrush, mucositis.  Moist mucous membranes	  Neck:no JVD, no lymphadenopathy, supple  Respiratory: CTA bud  Cardiovascular: S1S2 RRR, no murmurs  Gastrointestinal:soft, nontender,  nondistended (+) BS  Extremities:no e/e/c  Skin:  no rashes, open wounds or ulcerations        LABS:                        13.0   9.50  )-----------( 236      ( 09 Nov 2021 07:33 )             38.7     11-09    136  |  102  |  15  ----------------------------<  100<H>  3.9   |  24  |  0.69    Ca    9.2      09 Nov 2021 07:33  Phos  3.0     11-09  Mg     2.40     11-09                              MICROBIOLOGY:          RADIOLOGY & ADDITIONAL STUDIES:    
Nishant Bee MD  Interventional Cardiology / Advance Heart Failure and Cardiac Transplant Specialist  [place] Office : [address] [place] [address] [place] [address]  Tel:   [place] Office : [address] [facility] [place] [address]  Tel: [phone]  Cell : [phone]    Pt is lying in bed comfortable not in distress, no chest pains no SOB no palpitations  	  MEDICATIONS:  amLODIPine   Tablet 5 milliGRAM(s) Oral daily  aspirin  chewable 81 milliGRAM(s) Oral daily  metoprolol tartrate 25 milliGRAM(s) Oral two times a day    piperacillin/tazobactam IVPB.. 3.375 Gram(s) IV Intermittent every 8 hours      acetaminophen     Tablet .. 650 milliGRAM(s) Oral every 6 hours PRN  haloperidol    Injectable 1 milliGRAM(s) IV Push once  ondansetron Injectable 4 milliGRAM(s) IV Push three times a day PRN    pantoprazole    Tablet 40 milliGRAM(s) Oral before breakfast  polyethylene glycol 3350 17 Gram(s) Oral daily  senna 2 Tablet(s) Oral at bedtime      potassium phosphate / sodium phosphate Powder (PHOS-NaK) 1 Packet(s) Oral three times a day      PAST MEDICAL/SURGICAL HISTORY  PAST MEDICAL & SURGICAL HISTORY:  Alzheimer&#x27;s disease    HTN (hypertension)    Prediabetes    Severe aortic stenosis    Pacemaker        SOCIAL HISTORY: Substance Use (street drugs): ( x ) never used  (  ) other:    FAMILY HISTORY:  No pertinent family history in first degree relatives        REVIEW OF SYSTEMS:  CONSTITUTIONAL: No fever, weight loss, or fatigue  EYES: No eye pain, visual disturbances, or discharge  ENMT:  No difficulty hearing, tinnitus, vertigo; No sinus or throat pain  BREASTS: No pain, masses, or nipple discharge  GASTROINTESTINAL: No abdominal or epigastric pain. No nausea, vomiting, or hematemesis; No diarrhea or constipation. No melena or hematochezia.  GENITOURINARY: No dysuria, frequency, hematuria, or incontinence  NEUROLOGICAL: No headaches, memory loss, loss of strength, numbness, or tremors  ENDOCRINE: No heat or cold intolerance; No hair loss  MUSCULOSKELETAL: No joint pain or swelling; No muscle, back, or extremity pain  PSYCHIATRIC: No depression, anxiety, mood swings, or difficulty sleeping  HEME/LYMPH: No easy bruising, or bleeding gums       PHYSICAL EXAM:  T(C): 37.2 (11-07-21 @ 11:22), Max: 37.8 (11-07-21 @ 05:48)  HR: 52 (11-07-21 @ 11:59) (52 - 75)  BP: 106/56 (11-07-21 @ 11:59) (106/56 - 138/62)  RR: 18 (11-07-21 @ 11:59) (18 - 18)  SpO2: 100% (11-07-21 @ 11:59) (100% - 100%)  Wt(kg): --  I&O's Summary        GENERAL: NAD  EYES:   PERRLA   ENMT:   Moist mucous membranes, Good dentition, No lesions  Cardiovascular: Normal S1 S2, No JVD, No murmurs, No edema  Respiratory: Lungs clear to auscultation	  Gastrointestinal:  Soft, Non-tender, + BS	  Extremities: no edema                                    13.9   9.96  )-----------( 196      ( 06 Nov 2021 08:09 )             41.1     11-07    138  |  102  |  12  ----------------------------<  92  4.1   |  23  |  0.78    Ca    9.4      07 Nov 2021 07:14  Phos  3.1     11-07  Mg     2.30     11-07    TPro  7.3  /  Alb  3.4  /  TBili  2.0<H>  /  DBili  0.9<H>  /  AST  88<H>  /  ALT  207<H>  /  AlkPhos  272<H>  11-07    proBNP:   Lipid Profile:   HgA1c:   TSH:     Consultant(s) Notes Reviewed:  [x ] YES  [ ] NO    Care Discussed with Consultants/Other Providers [ x] YES  [ ] NO    Imaging Personally Reviewed independently:  [x] YES  [ ] NO    All labs, radiologic studies, vitals, orders and medications list reviewed. Patient is seen and examined at bedside. Case discussed with medical team.        
Nishant Bee MD  Interventional Cardiology / Advance Heart Failure and Cardiac Transplant Specialist  [place] Office : [address] [place] [address]  Tel:   [place] Office : [address] [facility] [place] [address]  Tel: [phone]  Cell : [phone]    Subjective/Overnight events: Pt is lying in bed s/p ERCP doing well   	  MEDICATIONS:  amLODIPine   Tablet 5 milliGRAM(s) Oral daily  aspirin  chewable 81 milliGRAM(s) Oral daily  metoprolol tartrate 25 milliGRAM(s) Oral two times a day    piperacillin/tazobactam IVPB.. 3.375 Gram(s) IV Intermittent every 8 hours      acetaminophen     Tablet .. 650 milliGRAM(s) Oral every 6 hours PRN  haloperidol    Injectable 1 milliGRAM(s) IV Push once  ondansetron Injectable 4 milliGRAM(s) IV Push three times a day PRN    pantoprazole    Tablet 40 milliGRAM(s) Oral before breakfast  polyethylene glycol 3350 17 Gram(s) Oral daily  senna 2 Tablet(s) Oral at bedtime          PAST MEDICAL/SURGICAL HISTORY  PAST MEDICAL & SURGICAL HISTORY:  Alzheimer&#x27;s disease    HTN (hypertension)    Prediabetes    Severe aortic stenosis    Pacemaker        SOCIAL HISTORY: Substance Use (street drugs): ( x ) never used  (  ) other:    FAMILY HISTORY:  No pertinent family history in first degree relatives          PHYSICAL EXAM:  T(C): 36.1 (11-04-21 @ 11:08), Max: 36.8 (11-04-21 @ 06:11)  HR: 65 (11-04-21 @ 12:15) (63 - 77)  BP: 133/53 (11-04-21 @ 12:15) (111/61 - 152/71)  RR: 19 (11-04-21 @ 12:15) (16 - 20)  SpO2: 98% (11-04-21 @ 12:15) (96% - 99%)  Wt(kg): --  I&O's Summary    Height (cm): 165.1 (11-04 @ 09:39)  Weight (kg): 53 (11-04 @ 09:39)  BMI (kg/m2): 19.4 (11-04 @ 09:39)  BSA (m2): 1.57 (11-04 @ 09:39)      GENERAL: NAD  EYES: EOMI, PERRLA, conjunctiva and sclera clear  ENMT: No tonsillar erythema, exudates, or enlargement  Cardiovascular: Normal S1 S2, No JVD, No murmurs, No edema  Respiratory: Lungs clear to auscultation	  Gastrointestinal:  Soft, Non-tender, + BS	  Extremities: No edema                                      14.0   12.42 )-----------( 195      ( 04 Nov 2021 07:07 )             41.9     11-04    136  |  100  |  13  ----------------------------<  162<H>  3.6   |  24  |  0.74    Ca    9.3      04 Nov 2021 07:07  Phos  1.6     11-03  Mg     2.30     11-03    TPro  7.6  /  Alb  3.7  /  TBili  5.8<H>  /  DBili  x   /  AST  237<H>  /  ALT  406<H>  /  AlkPhos  230<H>  11-04    proBNP:   Lipid Profile:   HgA1c:   TSH:     Consultant(s) Notes Reviewed:  [x ] YES  [ ] NO    Care Discussed with Consultants/Other Providers [ x] YES  [ ] NO    Imaging Personally Reviewed independently:  [x] YES  [ ] NO    All labs, radiologic studies, vitals, orders and medications list reviewed. Patient is seen and examined at bedside. Case discussed with medical team.        
ANESTHESIA POSTOP CHECK    80y Male POSTOP DAY 1     No COMPLAINTS    NO APPARENT ANESTHESIA COMPLICATIONS      
Date of service: 11-02-21 @ 22:27      Patient is a 80y old  Male who presents with a chief complaint of chest pain x4 days (02 Nov 2021 21:15)                                                               INTERVAL HPI/OVERNIGHT EVENTS:    REVIEW OF SYSTEMS:    Demented.  Unreliable ROS however denies anyComplaints at this time                                                                                                                                                                                                                                                                             Medications:  MEDICATIONS  (STANDING):  amLODIPine   Tablet 5 milliGRAM(s) Oral daily  aspirin  chewable 81 milliGRAM(s) Oral daily  atorvastatin 40 milliGRAM(s) Oral at bedtime  enoxaparin Injectable 40 milliGRAM(s) SubCutaneous daily  haloperidol    Injectable 1 milliGRAM(s) IV Push once  metoprolol tartrate 25 milliGRAM(s) Oral two times a day  pantoprazole    Tablet 40 milliGRAM(s) Oral before breakfast  piperacillin/tazobactam IVPB.. 3.375 Gram(s) IV Intermittent every 8 hours  polyethylene glycol 3350 17 Gram(s) Oral daily  senna 2 Tablet(s) Oral at bedtime    MEDICATIONS  (PRN):  acetaminophen     Tablet .. 650 milliGRAM(s) Oral every 6 hours PRN Temp greater or equal to 38C (100.4F), Mild Pain (1 - 3)  ondansetron Injectable 4 milliGRAM(s) IV Push three times a day PRN Nausea and/or Vomiting       Allergies    No Known Allergies    Intolerances      Vital Signs Last 24 Hrs  T(C): 36.9 (02 Nov 2021 21:05), Max: 38.9 (02 Nov 2021 17:25)  T(F): 98.5 (02 Nov 2021 21:05), Max: 102 (02 Nov 2021 17:25)  HR: 71 (02 Nov 2021 21:05) (61 - 88)  BP: 116/53 (02 Nov 2021 21:05) (106/52 - 123/64)  BP(mean): --  RR: 18 (02 Nov 2021 21:05) (17 - 18)  SpO2: 98% (02 Nov 2021 21:05) (98% - 100%)  CAPILLARY BLOOD GLUCOSE          Physical Exam:    Daily     Daily   General:  No acute distress  HEENT:  Nonicteric, PERRLA  CV:  RRR, S1S2 Systolic murmur  Lungs:  CTA B/L, no wheezes, rales, rhonchi  Abdomen:  Soft, non-tender, no distended, positive BS  Extremities:  No edema  Neuro:  Grossly nonfocal                                                                                                                                                                                                                                                                                          LABS:                               13.8   10.43 )-----------( 171      ( 02 Nov 2021 09:21 )             40.6                      11-02    139  |  102  |  19  ----------------------------<  182<H>  3.6   |  24  |  0.70    Ca    9.6      02 Nov 2021 09:21  Phos  1.4     11-02  Mg     2.20     11-02    TPro  8.1  /  Alb  4.2  /  TBili  2.7<H>  /  DBili  1.4<H>  /  AST  331<H>  /  ALT  179<H>  /  AlkPhos  206<H>  11-02                       RADIOLOGY & ADDITIONAL TESTS         I personally reviewed: [  ]EKG   [  ]CXR    [  ] CT      A/P:         Discussed with :     [facility] consultants' Notes   Time spent :  
Date of service: 11-04-21 @ 22:57      Patient is a 80y old  Male who presents with a chief complaint of chest pain x4 days (04 Nov 2021 13:42)                                                               INTERVAL HPI/OVERNIGHT EVENTS:    REVIEW OF SYSTEMS:    Still under anesthesia post ERCP of the time of my examination                                                                                                                                                                                                                                                                         Medications:  MEDICATIONS  (STANDING):  amLODIPine   Tablet 5 milliGRAM(s) Oral daily  aspirin  chewable 81 milliGRAM(s) Oral daily  haloperidol    Injectable 1 milliGRAM(s) IV Push once  metoprolol tartrate 25 milliGRAM(s) Oral two times a day  pantoprazole    Tablet 40 milliGRAM(s) Oral before breakfast  piperacillin/tazobactam IVPB.. 3.375 Gram(s) IV Intermittent every 8 hours  polyethylene glycol 3350 17 Gram(s) Oral daily  senna 2 Tablet(s) Oral at bedtime    MEDICATIONS  (PRN):  acetaminophen     Tablet .. 650 milliGRAM(s) Oral every 6 hours PRN Temp greater or equal to 38C (100.4F), Mild Pain (1 - 3)  ondansetron Injectable 4 milliGRAM(s) IV Push three times a day PRN Nausea and/or Vomiting       Allergies    No Known Allergies    Intolerances      Vital Signs Last 24 Hrs  T(C): 36.7 (04 Nov 2021 21:22), Max: 36.8 (04 Nov 2021 06:11)  T(F): 98 (04 Nov 2021 21:22), Max: 98.3 (04 Nov 2021 06:11)  HR: 61 (04 Nov 2021 21:22) (61 - 74)  BP: 104/62 (04 Nov 2021 21:22) (104/62 - 152/71)  BP(mean): --  RR: 17 (04 Nov 2021 21:22) (16 - 20)  SpO2: 100% (04 Nov 2021 21:22) (96% - 110%)  CAPILLARY BLOOD GLUCOSE          Physical Exam:    Daily Height in cm: 165.1 (04 Nov 2021 09:39)    Daily   General:  Well appearing, NAD, not cachetic  HEENT:  Nonicteric, PERRLA  CV:  RRR, S1S2   Lungs:  CTA B/L, no wheezes, rales, rhonchi  Abdomen:  Soft, non-tender, no distended, positive BS  Extremities: No edema                                                                                                                                                                                                                                                                                             LABS:                               14.0   12.42 )-----------( 195      ( 04 Nov 2021 07:07 )             41.9                      11-04    136  |  100  |  13  ----------------------------<  162<H>  3.6   |  24  |  0.74    Ca    9.3      04 Nov 2021 07:07  Phos  1.6     11-03  Mg     2.30     11-03    TPro  7.6  /  Alb  3.7  /  TBili  5.8<H>  /  DBili  x   /  AST  237<H>  /  ALT  406<H>  /  AlkPhos  230<H>  11-04                       RADIOLOGY & ADDITIONAL TESTS         I personally reviewed: [  ]EKG   [  ]CXR    [  ] CT      A/P:         Discussed with :     [facility] consultants' Notes   Time spent :  
  Nishant Bee MD  Interventional Cardiology / Endovascular Specialist  [place] Office : [address] [place] [address]. [phone]  Tel:   [place] Office : [address] [facility] [place] [address]  Tel: [phone]  Cell : [phone]    Subjective/Overnight events: Patient lying in bed   	  MEDICATIONS:  amLODIPine   Tablet 5 milliGRAM(s) Oral daily  aspirin  chewable 81 milliGRAM(s) Oral daily  metoprolol tartrate 25 milliGRAM(s) Oral two times a day    piperacillin/tazobactam IVPB.. 3.375 Gram(s) IV Intermittent every 8 hours      acetaminophen     Tablet .. 650 milliGRAM(s) Oral every 6 hours PRN  haloperidol    Injectable 1 milliGRAM(s) IV Push once  ondansetron Injectable 4 milliGRAM(s) IV Push three times a day PRN    pantoprazole    Tablet 40 milliGRAM(s) Oral before breakfast  polyethylene glycol 3350 17 Gram(s) Oral daily  senna 2 Tablet(s) Oral at bedtime          PAST MEDICAL/SURGICAL HISTORY  PAST MEDICAL & SURGICAL HISTORY:  Alzheimer&#x27;s disease    HTN (hypertension)    Prediabetes    Severe aortic stenosis    Pacemaker        SOCIAL HISTORY: Substance Use (street drugs): ( x ) never used  (  ) other:    FAMILY HISTORY:  No pertinent family history in first degree relatives            PHYSICAL EXAM:  T(C): 36.4 (11-09-21 @ 11:30), Max: 37 (11-08-21 @ 21:22)  HR: 61 (11-09-21 @ 11:30) (61 - 80)  BP: 108/65 (11-09-21 @ 11:30) (103/56 - 126/60)  RR: 17 (11-09-21 @ 11:30) (17 - 18)  SpO2: 100% (11-09-21 @ 11:30) (99% - 100%)  Wt(kg): --  I&O's Summary    08 Nov 2021 07:01  -  09 Nov 2021 07:00  --------------------------------------------------------  IN: 0 mL / OUT: 900 mL / NET: -900 mL    09 Nov 2021 07:01  -  09 Nov 2021 14:06  --------------------------------------------------------  IN: 0 mL / OUT: 400 mL / NET: -400 mL        GENERAL: NAD  EYES:   PERRLA   ENMT:   Moist mucous membranes, Good dentition, No lesions  Cardiovascular: Normal S1 S2, No JVD, No murmurs, No edema  Respiratory: Lungs clear to auscultation	  Gastrointestinal:  Soft, Non-tender, + BS	  Extremities: no edema                                    13.0   9.50  )-----------( 236      ( 09 Nov 2021 07:33 )             38.7     11-09    136  |  102  |  15  ----------------------------<  100<H>  3.9   |  24  |  0.69    Ca    9.2      09 Nov 2021 07:33  Phos  3.0     11-09  Mg     2.40     11-09      proBNP:   Lipid Profile:   HgA1c:   TSH:     Consultant(s) Notes Reviewed:  [x ] YES  [ ] NO    Care Discussed with Consultants/Other Providers [ x] YES  [ ] NO    Imaging Personally Reviewed independently:  [x] YES  [ ] NO    All labs, radiologic studies, vitals, orders and medications list reviewed. Patient is seen and examined at bedside. Case discussed with medical team.              
  Nishant Bee MD  Interventional Cardiology / Endovascular Specialist  [place] Office : [address] [place] [address]  Tel:   [place] Office : [address] [facility] [place] [address]  Tel: [phone]  Cell : [phone]    Subjective/Overnight events: Patient lying in bed comfortably. No acute distress. Denies chest pain, SOB or palpitations  	  MEDICATIONS:  amLODIPine   Tablet 5 milliGRAM(s) Oral daily  aspirin  chewable 81 milliGRAM(s) Oral daily  metoprolol tartrate 25 milliGRAM(s) Oral two times a day    piperacillin/tazobactam IVPB.. 3.375 Gram(s) IV Intermittent every 8 hours      acetaminophen     Tablet .. 650 milliGRAM(s) Oral every 6 hours PRN  haloperidol    Injectable 1 milliGRAM(s) IV Push once  ondansetron Injectable 4 milliGRAM(s) IV Push three times a day PRN    pantoprazole    Tablet 40 milliGRAM(s) Oral before breakfast  polyethylene glycol 3350 17 Gram(s) Oral daily  senna 2 Tablet(s) Oral at bedtime          PAST MEDICAL/SURGICAL HISTORY  PAST MEDICAL & SURGICAL HISTORY:  Alzheimer&#x27;s disease    HTN (hypertension)    Prediabetes    Severe aortic stenosis    Pacemaker        SOCIAL HISTORY: Substance Use (street drugs): ( x ) never used  (  ) other:    FAMILY HISTORY:  No pertinent family history in first degree relatives            PHYSICAL EXAM:  T(C): 37.4 (11-08-21 @ 05:27), Max: 37.4 (11-08-21 @ 05:27)  HR: 80 (11-08-21 @ 05:27) (52 - 80)  BP: 100/56 (11-08-21 @ 05:27) (96/71 - 106/63)  RR: 18 (11-08-21 @ 05:27) (18 - 18)  SpO2: 97% (11-08-21 @ 05:27) (97% - 100%)  Wt(kg): --  I&O's Summary          GENERAL: NAD  EYES:   PERRLA   ENMT:   Moist mucous membranes, Good dentition, No lesions  Cardiovascular: Normal S1 S2, No JVD, No murmurs, No edema  Respiratory: Lungs clear to auscultation	  Gastrointestinal:  Soft, Non-tender, + BS	  Extremities: no edema                                13.5   12.94 )-----------( 223      ( 08 Nov 2021 07:33 )             40.3     11-08    137  |  101  |  16  ----------------------------<  112<H>  4.1   |  23  |  0.88    Ca    9.2      08 Nov 2021 07:33  Phos  2.5     11-08  Mg     2.30     11-08    TPro  7.3  /  Alb  3.4  /  TBili  2.0<H>  /  DBili  0.9<H>  /  AST  88<H>  /  ALT  207<H>  /  AlkPhos  272<H>  11-07    proBNP:   Lipid Profile:   HgA1c:   TSH:     Consultant(s) Notes Reviewed:  [x ] YES  [ ] NO    Care Discussed with Consultants/Other Providers [ x] YES  [ ] NO    Imaging Personally Reviewed independently:  [x] YES  [ ] NO    All labs, radiologic studies, vitals, orders and medications list reviewed. Patient is seen and examined at bedside. Case discussed with medical team.              
Date of service: 11-06-21 @ 23:49      Patient is a 80y old  Male who presents with a chief complaint of chest pain x4 days (06 Nov 2021 13:18)                                                               INTERVAL HPI/OVERNIGHT EVENTS:    REVIEW OF SYSTEMS:     CONSTITUTIONAL: No weakness, fevers or chills  EYES/ENT: No visual changes , no ear ache   NECK: No pain or stiffness  RESPIRATORY: No cough, wheezing,  No shortness of breath  CARDIOVASCULAR: No chest pain or palpitations  GASTROINTESTINAL: No abdominal pain  . No nausea, vomiting, or hematemesis; No diarrhea or constipation. No melena or hematochezia.  GENITOURINARY: No dysuria, frequency or hematuria  NEUROLOGICAL: No numbness or weakness  SKIN: No itching, burning, rashes, or lesions                                                                                                                                                                                                                                                                                 Medications:  MEDICATIONS  (STANDING):  amLODIPine   Tablet 5 milliGRAM(s) Oral daily  aspirin  chewable 81 milliGRAM(s) Oral daily  haloperidol    Injectable 1 milliGRAM(s) IV Push once  metoprolol tartrate 25 milliGRAM(s) Oral two times a day  pantoprazole    Tablet 40 milliGRAM(s) Oral before breakfast  piperacillin/tazobactam IVPB.. 3.375 Gram(s) IV Intermittent every 8 hours  polyethylene glycol 3350 17 Gram(s) Oral daily  potassium phosphate / sodium phosphate Powder (PHOS-NaK) 1 Packet(s) Oral three times a day  senna 2 Tablet(s) Oral at bedtime    MEDICATIONS  (PRN):  acetaminophen     Tablet .. 650 milliGRAM(s) Oral every 6 hours PRN Temp greater or equal to 38C (100.4F), Mild Pain (1 - 3)  ondansetron Injectable 4 milliGRAM(s) IV Push three times a day PRN Nausea and/or Vomiting       Allergies    No Known Allergies    Intolerances      Vital Signs Last 24 Hrs  T(C): 37.2 (06 Nov 2021 21:45), Max: 37.2 (06 Nov 2021 21:45)  T(F): 98.9 (06 Nov 2021 21:45), Max: 98.9 (06 Nov 2021 21:45)  HR: 75 (06 Nov 2021 21:45) (66 - 78)  BP: 138/62 (06 Nov 2021 21:45) (102/57 - 138/62)  BP(mean): --  RR: 18 (06 Nov 2021 21:45) (18 - 18)  SpO2: 100% (06 Nov 2021 21:45) (95% - 100%)  CAPILLARY BLOOD GLUCOSE          Physical Exam:    Daily     Daily   General:  Well appearing, NAD, not cachetic  HEENT:  Nonicteric, PERRLA  CV:  RRR, S1S2   Lungs:  CTA B/L, no wheezes, rales, rhonchi  Abdomen:  Soft, non-tender, no distended, positive BS  Extremities:  2+ pulses, no c/c, no edema  Skin:  Warm and dry, no rashes  :  No duffy  Neuro:  AAOx3, non-focal, grossly intact                                                                                                                                                                                                                                                                                                LABS:                               13.9   9.96  )-----------( 196      ( 06 Nov 2021 08:09 )             41.1                      11-06    134<L>  |  99  |  9   ----------------------------<  91  3.1<L>   |  23  |  0.76    Ca    9.3      06 Nov 2021 08:09  Phos  1.4     11-05  Mg     2.30     11-05    TPro  7.4  /  Alb  3.5  /  TBili  2.5<H>  /  DBili  x   /  AST  163<H>  /  ALT  291<H>  /  AlkPhos  260<H>  11-06                       RADIOLOGY & ADDITIONAL TESTS         I personally reviewed: [  ]EKG   [  ]CXR    [  ] CT      A/P:         Discussed with :     [facility] consultants' Notes   Time spent :  
INTERVAL HPI/OVERNIGHT EVENTS:    doing well   no gi events overnight or this afternoon   resting comfortably without abdominal pain or n/v    MEDICATIONS  (STANDING):  amLODIPine   Tablet 5 milliGRAM(s) Oral daily  aspirin  chewable 81 milliGRAM(s) Oral daily  haloperidol    Injectable 1 milliGRAM(s) IV Push once  metoprolol tartrate 25 milliGRAM(s) Oral two times a day  pantoprazole    Tablet 40 milliGRAM(s) Oral before breakfast  piperacillin/tazobactam IVPB.. 3.375 Gram(s) IV Intermittent every 8 hours  polyethylene glycol 3350 17 Gram(s) Oral daily  senna 2 Tablet(s) Oral at bedtime    MEDICATIONS  (PRN):  acetaminophen     Tablet .. 650 milliGRAM(s) Oral every 6 hours PRN Temp greater or equal to 38C (100.4F), Mild Pain (1 - 3)  ondansetron Injectable 4 milliGRAM(s) IV Push three times a day PRN Nausea and/or Vomiting      Allergies    No Known Allergies    Intolerances        Review of Systems: *confused     General:  No wt loss, fevers, chills, night sweats, fatigue   Eyes:  Good vision, no reported pain  ENT:  No sore throat, pain, runny nose, dysphagia  CV:  No pain, palpitations, hypo/hypertension  Resp:  No dyspnea, cough, tachypnea, wheezing  GI:  No pain, No nausea, No vomiting, No diarrhea, No constipation, No weight loss, No fever, No pruritis, No rectal bleeding, No melena, No dysphagia  :  No pain, bleeding, incontinence, nocturia  Muscle:  No pain, weakness  Neuro:  No weakness, tingling, memory problems  Psych:  No fatigue, insomnia, mood problems, depression  Endocrine:  No polyuria, polydypsia, cold/heat intolerance  Heme:  No petechiae, ecchymosis, easy bruisability  Skin:  No rash, tattoos, scars, edema      Vital Signs Last 24 Hrs  T(C): 36.8 (05 Nov 2021 11:20), Max: 37 (05 Nov 2021 06:02)  T(F): 98.3 (05 Nov 2021 11:20), Max: 98.6 (05 Nov 2021 06:02)  HR: 62 (05 Nov 2021 11:20) (61 - 67)  BP: 104/69 (05 Nov 2021 11:20) (104/62 - 137/61)  BP(mean): --  RR: 18 (05 Nov 2021 11:20) (17 - 19)  SpO2: 100% (05 Nov 2021 11:20) (98% - 110%)    PHYSICAL EXAM:    Constitutional: NAD  HEENT: EOMI, throat clear  Neck: No LAD, supple  Respiratory: CTA and P  Cardiovascular: S1 and S2, RRR, no M  Gastrointestinal: BS+, soft, NT/ND, neg HSM,  Extremities: No peripheral edema, neg clubbing, cyanosis  Vascular: 2+ peripheral pulses  Neurological: A/O x 1, no focal deficits  Psychiatric: Normal mood, normal affect  Skin: No rashes      LABS:                        13.8   9.06  )-----------( 185      ( 05 Nov 2021 06:41 )             38.7     11-05    138  |  102  |  14  ----------------------------<  114<H>  3.4<L>   |  24  |  0.84    Ca    9.1      05 Nov 2021 06:41  Phos  1.4     11-05  Mg     2.30     11-05    TPro  7.6  /  Alb  3.7  /  TBili  5.8<H>  /  DBili  x   /  AST  237<H>  /  ALT  406<H>  /  AlkPhos  230<H>  11-04          RADIOLOGY & ADDITIONAL TESTS:      < from: ERCP (11.04.21 @ 09:14) >    [facility]  _______________________________________________________________________________  Patient Name: Leopoldo Ramos          Procedure Date: 11/4/2021 9:14 AM  MRN: [id number]                     Account Number: [id number]  YOB: 1940              Admit Type: Inpatient  Room: [place] [address]                         Gender: Male  Attending MD: Blayne Ramirez MD      _______________________________________________________________________________     Procedure:           ERCP  Indications:         Common bile duct stone(s), Suspected ascending                        cholangitis  Providers:           Blayne Ramirez MD  Medicines:           Monitored Anesthesia Care  Complications:       No immediate complications.  Procedure:           After obtaining informed consent, the scope was passed                        under direct vision. Throughout the procedure, the                        patient's blood pressure, pulse, and oxygen saturations              were monitored continuously. The ERCP was introduced                        through the mouth, and advanced to the duodenum and used                        to inject contrast into the bile duct. The ERCP was                        accomplished without difficulty. The patient tolerated                        the procedure well.                                                                                   Findings:       The  film was normal. The esophagus was successfully intubated        under direct vision. The scope was advanced to a normal major papilla in        the descending duodenum without detailed examination of the pharynx,        larynx and associated structures, and upper GI tract. The upper GI tract        wasgrossly normal. The bile duct was deeply cannulated with the        sphincterotome. Contrast was injected. I personally interpreted the bile        duct images. There was brisk flow of contrast through the ducts. Image        quality was excellent. Contrast extended to the entire biliary tree. The        common bile duct contained one filling defect(s) thought to be a stone.        Sphincterotomy was made with a sphincterotome. There was no        post-sphincterotomy bleeding. Mulitple ballon sweeps performed with the        removal of bile and one stone. Occlusion cholangiogram showed no further        filling defects..                                                                                   Impression:          - A filling defect consistent with a stone was seen on                        the cholangiogram.                       - A biliary sphincterotomy was performed.                       -Copious amounts of bile seen draining post procedure.  Recommendation:      - Return patient to [facility] for [facility] care.                       - Clear liquid diet.                       - Avoid aspirin and nonsteroidal anti-inflammatory                        medicines.                       Attending Participation:       I personally performed the entire procedure.                                                                                     Blayne Ramirez  ___________________  Blayne Ramirez MD  11/4/2021 11:00:44 AM  This report has been signed electronically.  Number of Addenda: 0    Note Initiated On: 11/4/2021 9:14 AM    < end of copied text >  
INTERVAL HPI/OVERNIGHT EVENTS:    doing welll  denies n/v/d/c, abdominal pain, melena or brbpr     MEDICATIONS  (STANDING):  amLODIPine   Tablet 5 milliGRAM(s) Oral daily  aspirin  chewable 81 milliGRAM(s) Oral daily  haloperidol    Injectable 1 milliGRAM(s) IV Push once  metoprolol tartrate 25 milliGRAM(s) Oral two times a day  pantoprazole    Tablet 40 milliGRAM(s) Oral before breakfast  piperacillin/tazobactam IVPB.. 3.375 Gram(s) IV Intermittent every 8 hours  polyethylene glycol 3350 17 Gram(s) Oral daily  potassium phosphate / sodium phosphate Powder (PHOS-NaK) 1 Packet(s) Oral three times a day  senna 2 Tablet(s) Oral at bedtime    MEDICATIONS  (PRN):  acetaminophen     Tablet .. 650 milliGRAM(s) Oral every 6 hours PRN Temp greater or equal to 38C (100.4F), Mild Pain (1 - 3)  ondansetron Injectable 4 milliGRAM(s) IV Push three times a day PRN Nausea and/or Vomiting      Allergies    No Known Allergies    Intolerances        Review of Systems:    General:  No wt loss, fevers, chills, night sweats, fatigue   Eyes:  Good vision, no reported pain  ENT:  No sore throat, pain, runny nose, dysphagia  CV:  No pain, palpitations, hypo/hypertension  Resp:  No dyspnea, cough, tachypnea, wheezing  GI:  No pain, No nausea, No vomiting, No diarrhea, No constipation, No weight loss, No fever, No pruritis, No rectal bleeding, No melena, No dysphagia  :  No pain, bleeding, incontinence, nocturia  Muscle:  No pain, weakness  Neuro:  No weakness, tingling, memory problems  Psych:  No fatigue, insomnia, mood problems, depression  Endocrine:  No polyuria, polydypsia, cold/heat intolerance  Heme:  No petechiae, ecchymosis, easy bruisability  Skin:  No rash, tattoos, scars, edema      Vital Signs Last 24 Hrs  T(C): 37.8 (07 Nov 2021 05:48), Max: 37.8 (07 Nov 2021 05:48)  T(F): 100 (07 Nov 2021 05:48), Max: 100 (07 Nov 2021 05:48)  HR: 70 (07 Nov 2021 05:48) (66 - 78)  BP: 119/52 (07 Nov 2021 05:48) (102/57 - 138/62)  BP(mean): --  RR: 18 (07 Nov 2021 05:48) (18 - 18)  SpO2: 100% (07 Nov 2021 05:48) (95% - 100%)    PHYSICAL EXAM:    Constitutional: NAD  HEENT: EOMI, throat clear  Neck: No LAD, supple  Respiratory: CTA and P  Cardiovascular: S1 and S2, RRR, no M  Gastrointestinal: BS+, soft, NT/ND, neg HSM,  Extremities: No peripheral edema, neg clubbing, cyanosis  Vascular: 2+ peripheral pulses  Neurological: A/O x 1, no focal deficits  Psychiatric: Normal mood, normal affect  Skin: No rashes      LABS:                        13.9   9.96  )-----------( 196      ( 06 Nov 2021 08:09 )             41.1     11-07    138  |  102  |  12  ----------------------------<  92  4.1   |  23  |  0.78    Ca    9.4      07 Nov 2021 07:14  Phos  3.1     11-07  Mg     2.30     11-07    TPro  7.4  /  Alb  3.5  /  TBili  2.5<H>  /  DBili  x   /  AST  163<H>  /  ALT  291<H>  /  AlkPhos  260<H>  11-06          RADIOLOGY & ADDITIONAL TESTS:  
Date of service: 11-03-21 @ 23:11      Patient is a 80y old  Male who presents with a chief complaint of chest pain x4 days (03 Nov 2021 15:14)                                                               INTERVAL HPI/OVERNIGHT EVENTS:    REVIEW OF SYSTEMS:     CONSTITUTIONAL: No weakness, fevers or chills  EYES/ENT: No visual changes , no ear ache   NECK: No pain or stiffness  RESPIRATORY: No cough, wheezing,  No shortness of breath  CARDIOVASCULAR: No chest pain or palpitations  GASTROINTESTINAL: No abdominal pain  . No nausea, vomiting, or hematemesis; No diarrhea or constipation. No melena or hematochezia.  GENITOURINARY: No dysuria, frequency or hematuria  NEUROLOGICAL: No numbness or weakness  SKIN: No itching, burning, rashes, or lesions                                                                                                                                                                                                                                                                                 Medications:  MEDICATIONS  (STANDING):  amLODIPine   Tablet 5 milliGRAM(s) Oral daily  aspirin  chewable 81 milliGRAM(s) Oral daily  atorvastatin 40 milliGRAM(s) Oral at bedtime  enoxaparin Injectable 40 milliGRAM(s) SubCutaneous daily  haloperidol    Injectable 1 milliGRAM(s) IV Push once  metoprolol tartrate 25 milliGRAM(s) Oral two times a day  pantoprazole    Tablet 40 milliGRAM(s) Oral before breakfast  piperacillin/tazobactam IVPB.. 3.375 Gram(s) IV Intermittent every 8 hours  polyethylene glycol 3350 17 Gram(s) Oral daily  senna 2 Tablet(s) Oral at bedtime    MEDICATIONS  (PRN):  acetaminophen     Tablet .. 650 milliGRAM(s) Oral every 6 hours PRN Temp greater or equal to 38C (100.4F), Mild Pain (1 - 3)  ondansetron Injectable 4 milliGRAM(s) IV Push three times a day PRN Nausea and/or Vomiting       Allergies    No Known Allergies    Intolerances      Vital Signs Last 24 Hrs  T(C): 36.4 (03 Nov 2021 20:14), Max: 37.8 (03 Nov 2021 05:05)  T(F): 97.5 (03 Nov 2021 20:14), Max: 100 (03 Nov 2021 05:05)  HR: 77 (03 Nov 2021 20:14) (76 - 81)  BP: 116/73 (03 Nov 2021 20:14) (111/61 - 120/65)  BP(mean): --  RR: 18 (03 Nov 2021 20:14) (18 - 18)  SpO2: 98% (03 Nov 2021 20:14) (97% - 99%)  CAPILLARY BLOOD GLUCOSE          Physical Exam:    Daily     Daily   General:  Well appearing, NAD, not cachetic  HEENT:  Nonicteric, PERRLA  CV:  RRR, S1S2 Systolic murmur  Lungs:  CTA B/L, no wheezes, rales, rhonchi  Abdomen:  Soft, non-tender, no distended, positive BS  Extremities:  2+ pulses, no c/c, no edema  Skin:  Warm and dry, no rashes  :  No duffy  Neuro:  Disoriented otherwise nonfocal                                                                                                                                                                                                                                                                                             LABS:                               13.0   10.00 )-----------( 159      ( 03 Nov 2021 07:32 )             38.3                      11-03    138  |  103  |  16  ----------------------------<  113<H>  3.7   |  23  |  0.78    Ca    9.0      03 Nov 2021 07:32  Phos  1.6     11-03  Mg     2.30     11-03    TPro  7.1  /  Alb  3.6  /  TBili  5.6<H>  /  DBili  2.9<H>  /  AST  493<H>  /  ALT  519<H>  /  AlkPhos  231<H>  11-03                       RADIOLOGY & ADDITIONAL TESTS         I personally reviewed: [  ]EKG   [  ]CXR    [  ] CT      A/P:         Discussed with :     [facility] consultants' Notes   Time spent :      
Date of service: 11-08-21 @ 21:55      Patient is a 80y old  Male who presents with a chief complaint of chest pain x4 days (08 Nov 2021 15:45)                                                               INTERVAL HPI/OVERNIGHT EVENTS:    REVIEW OF SYSTEMS:     CONSTITUTIONAL: No weakness, fevers or chills  EYES/ENT: No visual changes , no ear ache   NECK: No pain or stiffness  RESPIRATORY: No cough, wheezing,  No shortness of breath  CARDIOVASCULAR: No chest pain or palpitations  GASTROINTESTINAL: No abdominal pain  . No nausea, vomiting, or hematemesis; No diarrhea or constipation. No melena or hematochezia.  GENITOURINARY: No dysuria, frequency or hematuria  NEUROLOGICAL: No numbness or weakness  SKIN: No itching, burning, rashes, or lesions                                                                                                                                                                                                                                                                                 Medications:  MEDICATIONS  (STANDING):  amLODIPine   Tablet 5 milliGRAM(s) Oral daily  aspirin  chewable 81 milliGRAM(s) Oral daily  haloperidol    Injectable 1 milliGRAM(s) IV Push once  metoprolol tartrate 25 milliGRAM(s) Oral two times a day  pantoprazole    Tablet 40 milliGRAM(s) Oral before breakfast  piperacillin/tazobactam IVPB.. 3.375 Gram(s) IV Intermittent every 8 hours  polyethylene glycol 3350 17 Gram(s) Oral daily  senna 2 Tablet(s) Oral at bedtime    MEDICATIONS  (PRN):  acetaminophen     Tablet .. 650 milliGRAM(s) Oral every 6 hours PRN Temp greater or equal to 38C (100.4F), Mild Pain (1 - 3)  ondansetron Injectable 4 milliGRAM(s) IV Push three times a day PRN Nausea and/or Vomiting       Allergies    No Known Allergies    Intolerances      Vital Signs Last 24 Hrs  T(C): 37 (08 Nov 2021 21:22), Max: 37.4 (08 Nov 2021 05:27)  T(F): 98.6 (08 Nov 2021 21:22), Max: 99.4 (08 Nov 2021 05:27)  HR: 80 (08 Nov 2021 21:22) (68 - 80)  BP: 125/70 (08 Nov 2021 21:22) (100/56 - 133/76)  BP(mean): --  RR: 18 (08 Nov 2021 21:22) (17 - 18)  SpO2: 99% (08 Nov 2021 21:22) (97% - 100%)  CAPILLARY BLOOD GLUCOSE          Physical Exam:    Daily     Daily   General:  Well appearing, NAD, not cachetic  HEENT:  Nonicteric, PERRLA  CV:  RRR, S1S2   Lungs:  CTA B/L, no wheezes, rales, rhonchi  Abdomen:  Soft, non-tender, no distended, positive BS  Extremities:  2+ pulses, no c/c, no edema  Skin:  Warm and dry, no rashes  :  No duffy  Neuro:Nonfocal                                                                                                                                                                                                                                                                                    LABS:                               13.5   12.94 )-----------( 223      ( 08 Nov 2021 07:33 )             40.3                      11-08    137  |  101  |  16  ----------------------------<  112<H>  4.1   |  23  |  0.88    Ca    9.2      08 Nov 2021 07:33  Phos  2.5     11-08  Mg     2.30     11-08    TPro  7.3  /  Alb  3.4  /  TBili  2.0<H>  /  DBili  0.9<H>  /  AST  88<H>  /  ALT  207<H>  /  AlkPhos  272<H>  11-07                       RADIOLOGY & ADDITIONAL TESTS         I personally reviewed: [  ]EKG   [  ]CXR    [  ] CT      A/P:         Discussed with :     [facility] consultants' Notes   Time spent :  
[facility], Division of Infectious Diseases  SHAYY Kate Y. Patel, S. Shah  [phone]  ([phone] - weekdays after 5pm and weekends)    Name: RAMOS, LEOPOLDO  Age/Gender: 80y Male  MRN: [id number]    Interval History:  Patient seen this morning.   Notes reviewed.   No concerning overnight events.  Afebrile.   States he is feeling alright  Denies abdominal pain    Allergies: No Known Allergies      Objective:  Vitals:   T(F): 98.9 (11-07-21 @ 11:22), Max: 100 (11-07-21 @ 05:48)  HR: 52 (11-07-21 @ 11:59) (52 - 75)  BP: 106/56 (11-07-21 @ 11:59) (102/57 - 138/62)  RR: 18 (11-07-21 @ 11:59) (18 - 18)  SpO2: 100% (11-07-21 @ 11:59) (100% - 100%)  Physical Examination:  General: no acute distress, nontoxic appearing  HEENT: anicteric  Cardio: S1, S2 present, normal rate  Resp: breathing comfortably on RA  Abd: soft, NT, ND  Ext: no edema  Skin: warm, dry, no visible rash    Lines:    Laboratory Studies:  CBC:                       13.9   9.96  )-----------( 196      ( 06 Nov 2021 08:09 )             41.1     WBC Trend:  9.96 11-06-21 @ 08:09  9.06 11-05-21 @ 06:41  12.42 11-04-21 @ 07:07  10.00 11-03-21 @ 07:32  10.43 11-02-21 @ 09:21  9.60 10-31-21 @ 17:38    CMP: 11-07    138  |  102  |  12  ----------------------------<  92  4.1   |  23  |  0.78    Ca    9.4      07 Nov 2021 07:14  Phos  3.1     11-07  Mg     2.30     11-07    TPro  7.4  /  Alb  3.5  /  TBili  2.5<H>  /  DBili  x   /  AST  163<H>  /  ALT  291<H>  /  AlkPhos  260<H>  11-06      LIVER FUNCTIONS - ( 06 Nov 2021 08:09 )  Alb: 3.5 g/dL / Pro: 7.4 g/dL / ALK PHOS: 260 U/L / ALT: 291 U/L / AST: 163 U/L / GGT: x               Microbiology: reviewed     Culture - Blood (collected 11-02-21 @ 22:54)  Source: .Blood Blood-Venous  Preliminary Report (11-03-21 @ 23:01):    No growth to date.    Culture - Blood (collected 11-02-21 @ 22:54)  Source: .Blood Blood  Preliminary Report (11-03-21 @ 23:01):    No growth to date.      Radiology: reviewed     Medications:  acetaminophen     Tablet .. 650 milliGRAM(s) Oral every 6 hours PRN  amLODIPine   Tablet 5 milliGRAM(s) Oral daily  aspirin  chewable 81 milliGRAM(s) Oral daily  haloperidol    Injectable 1 milliGRAM(s) IV Push once  metoprolol tartrate 25 milliGRAM(s) Oral two times a day  ondansetron Injectable 4 milliGRAM(s) IV Push three times a day PRN  pantoprazole    Tablet 40 milliGRAM(s) Oral before breakfast  piperacillin/tazobactam IVPB.. 3.375 Gram(s) IV Intermittent every 8 hours  polyethylene glycol 3350 17 Gram(s) Oral daily  potassium phosphate / sodium phosphate Powder (PHOS-NaK) 1 Packet(s) Oral three times a day  senna 2 Tablet(s) Oral at bedtime    Antimicrobials:  piperacillin/tazobactam IVPB.. 3.375 Gram(s) IV Intermittent every 8 hours  
INTERVAL HPI/OVERNIGHT EVENTS:    doing welll  denies n/v/d/c, abdominal pain, melena or brbpr     MEDICATIONS  (STANDING):  amLODIPine   Tablet 5 milliGRAM(s) Oral daily  aspirin  chewable 81 milliGRAM(s) Oral daily  haloperidol    Injectable 1 milliGRAM(s) IV Push once  metoprolol tartrate 25 milliGRAM(s) Oral two times a day  pantoprazole    Tablet 40 milliGRAM(s) Oral before breakfast  piperacillin/tazobactam IVPB.. 3.375 Gram(s) IV Intermittent every 8 hours  polyethylene glycol 3350 17 Gram(s) Oral daily  potassium phosphate / sodium phosphate Powder (PHOS-NaK) 1 Packet(s) Oral three times a day  senna 2 Tablet(s) Oral at bedtime    MEDICATIONS  (PRN):  acetaminophen     Tablet .. 650 milliGRAM(s) Oral every 6 hours PRN Temp greater or equal to 38C (100.4F), Mild Pain (1 - 3)  ondansetron Injectable 4 milliGRAM(s) IV Push three times a day PRN Nausea and/or Vomiting      Allergies    No Known Allergies    Intolerances        Review of Systems:    General:  No wt loss, fevers, chills, night sweats, fatigue   Eyes:  Good vision, no reported pain  ENT:  No sore throat, pain, runny nose, dysphagia  CV:  No pain, palpitations, hypo/hypertension  Resp:  No dyspnea, cough, tachypnea, wheezing  GI:  No pain, No nausea, No vomiting, No diarrhea, No constipation, No weight loss, No fever, No pruritis, No rectal bleeding, No melena, No dysphagia  :  No pain, bleeding, incontinence, nocturia  Muscle:  No pain, weakness  Neuro:  No weakness, tingling, memory problems  Psych:  No fatigue, insomnia, mood problems, depression  Endocrine:  No polyuria, polydypsia, cold/heat intolerance  Heme:  No petechiae, ecchymosis, easy bruisability  Skin:  No rash, tattoos, scars, edema      Vital Signs Last 24 Hrs  T(C): 37.8 (07 Nov 2021 05:48), Max: 37.8 (07 Nov 2021 05:48)  T(F): 100 (07 Nov 2021 05:48), Max: 100 (07 Nov 2021 05:48)  HR: 70 (07 Nov 2021 05:48) (66 - 78)  BP: 119/52 (07 Nov 2021 05:48) (102/57 - 138/62)  BP(mean): --  RR: 18 (07 Nov 2021 05:48) (18 - 18)  SpO2: 100% (07 Nov 2021 05:48) (95% - 100%)    PHYSICAL EXAM:    Constitutional: NAD  HEENT: EOMI, throat clear  Neck: No LAD, supple  Respiratory: CTA and P  Cardiovascular: S1 and S2, RRR, no M  Gastrointestinal: BS+, soft, NT/ND, neg HSM,  Extremities: No peripheral edema, neg clubbing, cyanosis  Vascular: 2+ peripheral pulses  Neurological: A/O x 1, no focal deficits  Psychiatric: Normal mood, normal affect  Skin: No rashes      LABS:                        13.9   9.96  )-----------( 196      ( 06 Nov 2021 08:09 )             41.1     11-07    138  |  102  |  12  ----------------------------<  92  4.1   |  23  |  0.78    Ca    9.4      07 Nov 2021 07:14  Phos  3.1     11-07  Mg     2.30     11-07    TPro  7.4  /  Alb  3.5  /  TBili  2.5<H>  /  DBili  x   /  AST  163<H>  /  ALT  291<H>  /  AlkPhos  260<H>  11-06          RADIOLOGY & ADDITIONAL TESTS:  
INTERVAL HPI/OVERNIGHT EVENTS:    overnight w/fever, vomiting and pain   this morning greatly improved, tolerating PO   without c/o abd pain     MEDICATIONS  (STANDING):  amLODIPine   Tablet 5 milliGRAM(s) Oral daily  aspirin  chewable 81 milliGRAM(s) Oral daily  atorvastatin 40 milliGRAM(s) Oral at bedtime  enoxaparin Injectable 40 milliGRAM(s) SubCutaneous daily  haloperidol    Injectable 1 milliGRAM(s) IV Push once  metoprolol tartrate 25 milliGRAM(s) Oral two times a day  pantoprazole    Tablet 40 milliGRAM(s) Oral before breakfast  piperacillin/tazobactam IVPB.. 3.375 Gram(s) IV Intermittent every 8 hours  polyethylene glycol 3350 17 Gram(s) Oral daily  senna 2 Tablet(s) Oral at bedtime    MEDICATIONS  (PRN):  acetaminophen     Tablet .. 650 milliGRAM(s) Oral every 6 hours PRN Temp greater or equal to 38C (100.4F), Mild Pain (1 - 3)  ondansetron Injectable 4 milliGRAM(s) IV Push three times a day PRN Nausea and/or Vomiting      Allergies    No Known Allergies    Intolerances        Review of Systems:    General:  No wt loss, fevers, chills, night sweats, fatigue   Eyes:  Good vision, no reported pain  ENT:  No sore throat, pain, runny nose, dysphagia  CV:  No pain, palpitations, hypo/hypertension  Resp:  No dyspnea, cough, tachypnea, wheezing  GI:  No pain, No nausea, No vomiting, No diarrhea, No constipation, No weight loss, No fever, No pruritis, No rectal bleeding, No melena, No dysphagia  :  No pain, bleeding, incontinence, nocturia  Muscle:  No pain, weakness  Neuro:  No weakness, tingling, memory problems  Psych:  No fatigue, insomnia, mood problems, depression  Endocrine:  No polyuria, polydypsia, cold/heat intolerance  Heme:  No petechiae, ecchymosis, easy bruisability  Skin:  No rash, tattoos, scars, edema      Vital Signs Last 24 Hrs  T(C): 36.8 (2021 11:25), Max: 38.9 (2021 17:25)  T(F): 98.3 (2021 11:25), Max: 102 (2021 17:25)  HR: 76 (2021 11:25) (71 - 88)  BP: 120/65 (2021 11:25) (108/60 - 120/65)  BP(mean): --  RR: 18 (2021 11:25) (18 - 18)  SpO2: 97% (2021 11:25) (97% - 99%)    PHYSICAL EXAM:    Constitutional: NAD  HEENT: EOMI, throat clear  Neck: No LAD, supple  Respiratory: CTA and P  Cardiovascular: S1 and S2, RRR, no M  Gastrointestinal: BS+, soft, NT/ND, neg HSM,  Extremities: No peripheral edema, neg clubbing, cyanosis  Vascular: 2+ peripheral pulses  Neurological: A/O x 1, no focal deficits  Psychiatric: Normal mood, normal affect; (+) confused   Skin: No rashes      LABS:                        13.0   10.00 )-----------( 159      ( 2021 07:32 )             38.3     11-03    138  |  103  |  16  ----------------------------<  113<H>  3.7   |  23  |  0.78    Ca    9.0      2021 07:32  Phos  1.6     11-  Mg     2.30     11-    TPro  7.1  /  Alb  3.6  /  TBili  5.6<H>  /  DBili  2.9<H>  /  AST  493<H>  /  ALT  519<H>  /  AlkPhos  231<H>  11-03      Urinalysis Basic - ( 2021 23:01 )    Color: Emily / Appearance: Clear / S.049 / pH: x  Gluc: x / Ketone: Negative  / Bili: Small / Urobili: 12 mg/dL   Blood: x / Protein: 30 mg/dL / Nitrite: Negative   Leuk Esterase: Small / RBC: 3 /HPF / WBC 7 /HPF   Sq Epi: x / Non Sq Epi: 2 /HPF / Bacteria: Few        RADIOLOGY & ADDITIONAL TESTS:    CT results pending   
Infectious Diseases progress note:    Subjective: Coverage appreciated.  NAD, afebrile.  No acute o/n events, no abd pain    ROS:  CONSTITUTIONAL:  No fever, chills, rigors  CARDIOVASCULAR:  No chest pain or palpitations  RESPIRATORY:   No SOB, cough, dyspnea on exertion.  No wheezing  GASTROINTESTINAL:  No abd pain, N/V, diarrhea/constipation  EXTREMITIES:  No swelling or joint pain  GENITOURINARY:  No burning on urination, increased frequency or urgency.  No flank pain  NEUROLOGIC:  No HA, visual disturbances  SKIN: No rashes    Allergies    No Known Allergies    Intolerances        ANTIBIOTICS/RELEVANT:  antimicrobials  piperacillin/tazobactam IVPB.. 3.375 Gram(s) IV Intermittent every 8 hours    immunologic:    OTHER:  acetaminophen     Tablet .. 650 milliGRAM(s) Oral every 6 hours PRN  amLODIPine   Tablet 5 milliGRAM(s) Oral daily  aspirin  chewable 81 milliGRAM(s) Oral daily  haloperidol    Injectable 1 milliGRAM(s) IV Push once  metoprolol tartrate 25 milliGRAM(s) Oral two times a day  ondansetron Injectable 4 milliGRAM(s) IV Push three times a day PRN  pantoprazole    Tablet 40 milliGRAM(s) Oral before breakfast  polyethylene glycol 3350 17 Gram(s) Oral daily  senna 2 Tablet(s) Oral at bedtime      Objective:  Vital Signs Last 24 Hrs  T(C): 37 (08 Nov 2021 21:22), Max: 37.4 (08 Nov 2021 05:27)  T(F): 98.6 (08 Nov 2021 21:22), Max: 99.4 (08 Nov 2021 05:27)  HR: 80 (08 Nov 2021 21:22) (68 - 80)  BP: 125/70 (08 Nov 2021 21:22) (100/56 - 133/76)  BP(mean): --  RR: 18 (08 Nov 2021 21:22) (17 - 18)  SpO2: 99% (08 Nov 2021 21:22) (97% - 100%)    PHYSICAL EXAM:  Constitutional:NAD  Eyes:MARCOS, EOMI  Ear/Nose/Throat: no thrush, mucositis.  Moist mucous membranes	  Neck:no JVD, no lymphadenopathy, supple  Respiratory: CTA bud  Cardiovascular: S1S2 RRR, no murmurs  Gastrointestinal:soft, nontender,  nondistended (+) BS  Extremities:no e/e/c  Skin:  no rashes, open wounds or ulcerations        LABS:                        13.5   12.94 )-----------( 223      ( 08 Nov 2021 07:33 )             40.3     11-08    137  |  101  |  16  ----------------------------<  112<H>  4.1   |  23  |  0.88    Ca    9.2      08 Nov 2021 07:33  Phos  2.5     11-08  Mg     2.30     11-08    TPro  7.3  /  Alb  3.4  /  TBili  2.0<H>  /  DBili  0.9<H>  /  AST  88<H>  /  ALT  207<H>  /  AlkPhos  272<H>  11-07                MICROBIOLOGY:    Culture - Blood (11.02.21 @ 18:31)   Specimen Source: .Blood Blood-Venous   Culture Results:   No Growth Final     Culture - Blood (11.02.21 @ 18:20)   Specimen Source: .Blood Blood   Culture Results:   No Growth Final         RADIOLOGY & ADDITIONAL STUDIES:    < from: CT Abdomen and Pelvis w/ IV Cont (11.02.21 @ 22:19) >  IMPRESSION:  Minimal gallbladder wall edema with question area of wall irregularity versus motion artifact. No significant pericholecystic fluid. If there is clinical concern for acute cholecystitis, more definitive characterization may be obtained with nuclear medicine HIDA scan.    A 1.4 cm left adrenal gland nodule, indeterminate. More definitive characterization may be obtained on nonemergent outpatient basis with adrenal protocol CT or MR.    No bowel obstruction.    < end of copied text >        < from: Xray Chest 1 View- PORTABLE-Urgent (Xray Chest 1 View- PORTABLE-Urgent .) (11.02.21 @ 19:59) >  FINDINGS:    Left chest wall dual-chamber pacemaker.    The lungs are clear. No pleural effusions or pneumothorax.    The heart is normal in size.    No acute osseous pathology.    IMPRESSION:    Clear lungs.    < end of copied text >  
Nishant Bee MD  Interventional Cardiology / Advance Heart Failure and Cardiac Transplant Specialist  [place] Office : [address] [place] [address] [place] [address]  Tel:   [place] Office : [address] [facility] [place] [address]  Tel: [phone]  Cell : [phone]    Pt is lying in bed comfortable not in distress, no chest pains no SOB no palpitations  	  MEDICATIONS:  amLODIPine   Tablet 5 milliGRAM(s) Oral daily  aspirin  chewable 81 milliGRAM(s) Oral daily  metoprolol tartrate 25 milliGRAM(s) Oral two times a day    piperacillin/tazobactam IVPB.. 3.375 Gram(s) IV Intermittent every 8 hours      acetaminophen     Tablet .. 650 milliGRAM(s) Oral every 6 hours PRN  haloperidol    Injectable 1 milliGRAM(s) IV Push once  ondansetron Injectable 4 milliGRAM(s) IV Push three times a day PRN    pantoprazole    Tablet 40 milliGRAM(s) Oral before breakfast  polyethylene glycol 3350 17 Gram(s) Oral daily  senna 2 Tablet(s) Oral at bedtime      potassium chloride    Tablet ER 40 milliEquivalent(s) Oral every 4 hours  potassium phosphate / sodium phosphate Powder (PHOS-NaK) 1 Packet(s) Oral three times a day      PAST MEDICAL/SURGICAL HISTORY  PAST MEDICAL & SURGICAL HISTORY:  Alzheimer&#x27;s disease    HTN (hypertension)    Prediabetes    Severe aortic stenosis    Pacemaker        SOCIAL HISTORY: Substance Use (street drugs): ( x ) never used  (  ) other:    FAMILY HISTORY:  No pertinent family history in first degree relatives        REVIEW OF SYSTEMS:  CONSTITUTIONAL: No fever, weight loss, or fatigue  EYES: No eye pain, visual disturbances, or discharge  ENMT:  No difficulty hearing, tinnitus, vertigo; No sinus or throat pain  BREASTS: No pain, masses, or nipple discharge  GASTROINTESTINAL: No abdominal or epigastric pain. No nausea, vomiting, or hematemesis; No diarrhea or constipation. No melena or hematochezia.  GENITOURINARY: No dysuria, frequency, hematuria, or incontinence  NEUROLOGICAL: No headaches, memory loss, loss of strength, numbness, or tremors  ENDOCRINE: No heat or cold intolerance; No hair loss  MUSCULOSKELETAL: No joint pain or swelling; No muscle, back, or extremity pain  PSYCHIATRIC: No depression, anxiety, mood swings, or difficulty sleeping  HEME/LYMPH: No easy bruising, or bleeding gums       PHYSICAL EXAM:  T(C): 36.4 (11-06-21 @ 12:53), Max: 37 (11-06-21 @ 05:30)  HR: 66 (11-06-21 @ 12:53) (65 - 78)  BP: 102/57 (11-06-21 @ 12:53) (102/57 - 125/95)  RR: 18 (11-06-21 @ 12:08) (18 - 18)  SpO2: 95% (11-06-21 @ 12:08) (95% - 100%)  Wt(kg): --  I&O's Summary        GENERAL: NAD  EYES:   PERRLA   ENMT:   Moist mucous membranes, Good dentition, No lesions  Cardiovascular: Normal S1 S2, No JVD, No murmurs, No edema  Respiratory: Lungs clear to auscultation	  Gastrointestinal:  Soft, Non-tender, + BS	  Extremities: no edema                                    13.9   9.96  )-----------( 196      ( 06 Nov 2021 08:09 )             41.1     11-06    134<L>  |  99  |  9   ----------------------------<  91  3.1<L>   |  23  |  0.76    Ca    9.3      06 Nov 2021 08:09  Phos  1.4     11-05  Mg     2.30     11-05    TPro  7.4  /  Alb  3.5  /  TBili  2.5<H>  /  DBili  x   /  AST  163<H>  /  ALT  291<H>  /  AlkPhos  260<H>  11-06    proBNP:   Lipid Profile:   HgA1c:   TSH:     Consultant(s) Notes Reviewed:  [x ] YES  [ ] NO    Care Discussed with Consultants/Other Providers [ x] YES  [ ] NO    Imaging Personally Reviewed independently:  [x] YES  [ ] NO    All labs, radiologic studies, vitals, orders and medications list reviewed. Patient is seen and examined at bedside. Case discussed with medical team.        
Nishant Bee MD  Interventional Cardiology / Advance Heart Failure and Cardiac Transplant Specialist  [place] Office : [address] [place] [id number]  Tel:   [place] Office : [address] [facility] [place] [address]  Tel: [phone]  Cell : [phone]    Subjective/Overnight events: Pt is lying in bed comfortable not in distress  	  MEDICATIONS:  amLODIPine   Tablet 5 milliGRAM(s) Oral daily  aspirin  chewable 81 milliGRAM(s) Oral daily  enoxaparin Injectable 40 milliGRAM(s) SubCutaneous daily  metoprolol tartrate 25 milliGRAM(s) Oral two times a day    piperacillin/tazobactam IVPB.. 3.375 Gram(s) IV Intermittent every 8 hours      acetaminophen     Tablet .. 650 milliGRAM(s) Oral every 6 hours PRN  haloperidol    Injectable 1 milliGRAM(s) IV Push once  ondansetron Injectable 4 milliGRAM(s) IV Push three times a day PRN    pantoprazole    Tablet 40 milliGRAM(s) Oral before breakfast  polyethylene glycol 3350 17 Gram(s) Oral daily  senna 2 Tablet(s) Oral at bedtime    atorvastatin 40 milliGRAM(s) Oral at bedtime        PAST MEDICAL/SURGICAL HISTORY  PAST MEDICAL & SURGICAL HISTORY:  Alzheimer&#x27;s disease    HTN (hypertension)    Prediabetes    Severe aortic stenosis    Pacemaker        SOCIAL HISTORY: Substance Use (street drugs): ( x ) never used  (  ) other:    FAMILY HISTORY:  No pertinent family history in first degree relatives            PHYSICAL EXAM:  T(C): 36.8 (11-03-21 @ 11:25), Max: 38.9 (11-02-21 @ 17:25)  HR: 76 (11-03-21 @ 11:25) (71 - 88)  BP: 120/65 (11-03-21 @ 11:25) (108/60 - 120/65)  RR: 18 (11-03-21 @ 11:25) (18 - 18)  SpO2: 97% (11-03-21 @ 11:25) (97% - 99%)  Wt(kg): --  I&O's Summary        GENERAL: NAD  EYES: EOMI, PERRLA, conjunctiva and sclera clear  ENMT: No tonsillar erythema, exudates, or enlargement  Cardiovascular: Normal S1 S2, No JVD, No murmurs, No edema  Respiratory: Lungs clear to auscultation	  Gastrointestinal:  Soft, Non-tender, + BS	  Extremities: No edema                                  13.0   10.00 )-----------( 159      ( 03 Nov 2021 07:32 )             38.3     11-03    138  |  103  |  16  ----------------------------<  113<H>  3.7   |  23  |  0.78    Ca    9.0      03 Nov 2021 07:32  Phos  1.6     11-03  Mg     2.30     11-03    TPro  7.1  /  Alb  3.6  /  TBili  5.6<H>  /  DBili  2.9<H>  /  AST  493<H>  /  ALT  519<H>  /  AlkPhos  231<H>  11-03    proBNP:   Lipid Profile:   HgA1c:   TSH:     Consultant(s) Notes Reviewed:  [x ] YES  [ ] NO    Care Discussed with Consultants/Other Providers [ x] YES  [ ] NO    Imaging Personally Reviewed independently:  [x] YES  [ ] NO    All labs, radiologic studies, vitals, orders and medications list reviewed. Patient is seen and examined at bedside. Case discussed with medical team.        
Nishant Bee MD  Interventional Cardiology / Advance Heart Failure and Cardiac Transplant Specialist  [place] Office : [address] [place] [address]. [phone]  Tel:   [place] Office : [address] [facility] [place] [address]  Tel: [phone]  Cell : [phone]    Subjective/Overnight events: Pt is lying in bed comfortable not in distress, no chest pains no SOB no palpitations  	  MEDICATIONS:  amLODIPine   Tablet 5 milliGRAM(s) Oral daily  aspirin  chewable 81 milliGRAM(s) Oral daily  enoxaparin Injectable 40 milliGRAM(s) SubCutaneous daily  metoprolol tartrate 25 milliGRAM(s) Oral two times a day        acetaminophen     Tablet .. 650 milliGRAM(s) Oral every 6 hours PRN  haloperidol    Injectable 1 milliGRAM(s) IV Push once    pantoprazole    Tablet 40 milliGRAM(s) Oral before breakfast    atorvastatin 40 milliGRAM(s) Oral at bedtime    potassium phosphate / sodium phosphate Powder (PHOS-NaK) 1 Packet(s) Oral once      PAST MEDICAL/SURGICAL HISTORY  PAST MEDICAL & SURGICAL HISTORY:  Alzheimer&#x27;s disease    HTN (hypertension)    Prediabetes    Severe aortic stenosis    Pacemaker        SOCIAL HISTORY: Substance Use (street drugs): ( x ) never used  (  ) other:    FAMILY HISTORY:  No pertinent family history in first degree relatives          PHYSICAL EXAM:  T(C): 36.7 (11-02-21 @ 05:37), Max: 36.8 (11-01-21 @ 11:40)  HR: 72 (11-02-21 @ 05:37) (61 - 81)  BP: 106/52 (11-02-21 @ 05:37) (106/52 - 130/68)  RR: 18 (11-02-21 @ 05:37) (17 - 18)  SpO2: 99% (11-02-21 @ 05:37) (99% - 100%)  Wt(kg): --  I&O's Summary      Weight (kg): 53 (11-01 @ 21:49)      GENERAL: NAD  EYES: EOMI, PERRLA, conjunctiva and sclera clear  ENMT: No tonsillar erythema, exudates, or enlargement  Cardiovascular: Normal S1 S2, No JVD, No murmurs, No edema  Respiratory: Lungs clear to auscultation	  Gastrointestinal:  Soft, Non-tender, + BS	  Extremities: No edema                                    13.8   10.43 )-----------( 171      ( 02 Nov 2021 09:21 )             40.6     11-02    139  |  102  |  19  ----------------------------<  182<H>  3.6   |  24  |  0.70    Ca    9.6      02 Nov 2021 09:21  Phos  1.4     11-02  Mg     2.20     11-02    TPro  7.9  /  Alb  4.5  /  TBili  1.8<H>  /  DBili  x   /  AST  19  /  ALT  20  /  AlkPhos  61  10-31    proBNP:   Lipid Profile:   HgA1c:   TSH:     Consultant(s) Notes Reviewed:  [x ] YES  [ ] NO    Care Discussed with Consultants/Other Providers [ x] YES  [ ] NO    Imaging Personally Reviewed independently:  [x] YES  [ ] NO    All labs, radiologic studies, vitals, orders and medications list reviewed. Patient is seen and examined at bedside. Case discussed with medical team.        
Patient seen and examined this am.   Was conversational.  He denies abdominal pain this morning.   Ordered for regular diet, unclear if he is tolerating as history limited.       Vital Signs Last 24 Hrs  T(C): 37.8 (03 Nov 2021 05:05), Max: 38.9 (02 Nov 2021 17:25)  T(F): 100 (03 Nov 2021 05:05), Max: 102 (02 Nov 2021 17:25)  HR: 79 (03 Nov 2021 05:05) (71 - 88)  BP: 116/64 (03 Nov 2021 05:05) (108/60 - 123/64)  RR: 18 (03 Nov 2021 05:05) (18 - 18)  SpO2: 99% (03 Nov 2021 05:05) (98% - 99%)    No acute distress  No increased work of breathing, no wheezing   Abd soft, NT, ND                           13.0   10.00 )-----------( 159      ( 03 Nov 2021 07:32 )             38.3     AM LFTs pending  11/2 T Bili 2.7 with transaminitis lipase normal     US Reviewed   
Nishant Bee MD  Interventional Cardiology / Advance Heart Failure and Cardiac Transplant Specialist  [place] Office : [address] [place] [address] [place]. [address]  Tel:   [place] Office : [address] [facility] [place] [address]  Tel: [phone]  Cell : [phone]    Pt is lying in bed comfortable not in distress, no chest pains no SOB no palpitations  	  MEDICATIONS:  amLODIPine   Tablet 5 milliGRAM(s) Oral daily  aspirin  chewable 81 milliGRAM(s) Oral daily  metoprolol tartrate 25 milliGRAM(s) Oral two times a day    piperacillin/tazobactam IVPB.. 3.375 Gram(s) IV Intermittent every 8 hours      acetaminophen     Tablet .. 650 milliGRAM(s) Oral every 6 hours PRN  haloperidol    Injectable 1 milliGRAM(s) IV Push once  ondansetron Injectable 4 milliGRAM(s) IV Push three times a day PRN    pantoprazole    Tablet 40 milliGRAM(s) Oral before breakfast  polyethylene glycol 3350 17 Gram(s) Oral daily  senna 2 Tablet(s) Oral at bedtime          PAST MEDICAL/SURGICAL HISTORY  PAST MEDICAL & SURGICAL HISTORY:  Alzheimer&#x27;s disease    HTN (hypertension)    Prediabetes    Severe aortic stenosis    Pacemaker        SOCIAL HISTORY: Substance Use (street drugs): ( x ) never used  (  ) other:    FAMILY HISTORY:  No pertinent family history in first degree relatives              PHYSICAL EXAM:  T(C): 36.8 (11-05-21 @ 11:20), Max: 37 (11-05-21 @ 06:02)  HR: 62 (11-05-21 @ 11:20) (61 - 67)  BP: 104/69 (11-05-21 @ 11:20) (104/62 - 128/60)  RR: 18 (11-05-21 @ 11:20) (17 - 18)  SpO2: 100% (11-05-21 @ 11:20) (98% - 110%)  Wt(kg): --  I&O's Summary           EYES: EOMI, PERRLA, conjunctiva and sclera clear  ENMT: No tonsillar erythema, exudates, or enlargement; Moist mucous membranes, Good dentition, No lesions  Cardiovascular: Normal S1 S2, No JVD, No murmurs, No edema  Respiratory: Lungs clear to auscultation	  Gastrointestinal:  Soft, Non-tender, + BS	  Extremities: no edema                                    13.8   9.06  )-----------( 185      ( 05 Nov 2021 06:41 )             38.7     11-05    138  |  102  |  14  ----------------------------<  114<H>  3.4<L>   |  24  |  0.84    Ca    9.1      05 Nov 2021 06:41  Phos  1.4     11-05  Mg     2.30     11-05    TPro  7.6  /  Alb  3.7  /  TBili  5.8<H>  /  DBili  x   /  AST  237<H>  /  ALT  406<H>  /  AlkPhos  230<H>  11-04    proBNP:   Lipid Profile:   HgA1c:   TSH:     Consultant(s) Notes Reviewed:  [x ] YES  [ ] NO    Care Discussed with Consultants/Other Providers [ x] YES  [ ] NO    Imaging Personally Reviewed independently:  [x] YES  [ ] NO    All labs, radiologic studies, vitals, orders and medications list reviewed. Patient is seen and examined at bedside. Case discussed with medical team.

## 2021-11-09 NOTE — DIETITIAN INITIAL EVALUATION ADULT. - SIGNS/SYMPTOMS
wt loss of >10% in 6 months, eating <50% of est. nutritional needs, severe muscle wasting/fat loss wt loss of >13% x6 months, <75% of Kcal intake, severe muscle wasting/fat loss described above

## 2021-11-09 NOTE — DISCHARGE NOTE PROVIDER - NSDCCPCAREPLAN_GEN_ALL_CORE_FT
PRINCIPAL DISCHARGE DIAGNOSIS  Diagnosis: Chest pain  Assessment and Plan of Treatment: Home Hospice      SECONDARY DISCHARGE DIAGNOSES  Diagnosis: HTN (hypertension)  Assessment and Plan of Treatment: Low sodium and fat diet, continue anti-hypertensive medications, and follow up with primary care physician.

## 2021-11-09 NOTE — PROGRESS NOTE ADULT - PROVIDER SPECIALTY LIST ADULT
Internal Medicine
Cardiology
Gastroenterology
Infectious Disease
Internal Medicine
Internal Medicine
Anesthesia
Cardiology
Gastroenterology
Gastroenterology
Infectious Disease
Internal Medicine
Surgery
Cardiology
Gastroenterology
Infectious Disease
Internal Medicine

## 2021-11-09 NOTE — DIETITIAN INITIAL EVALUATION ADULT. - OTHER INFO
Pt is a 79 y/o male, AxO x1, admitted due to chest pain, with a PMHx of severe aortic stenosis, prediabetes, HTN, Alzheimer's disease, dementia and PPM.     Weight trends: 116.8 lbs (11/4); 116.8 lbs (11/1); 134.6 lbs (04/29); 131.5 lbs (04/19)  Per grandson, pt lost weight in the last 4 months as pt has become more picky about eating and likes to walk a lot, grandson unable to recall his last weight @home PTA.     Pt appeared confused at time of RD visit, but was able to report fair appetite and no current GI distress (abd pain/N/V/D/C). Per nurse, pt ate quarter of his breakfast today. No chewing/swallowing difficulty at this time per nurse. Skin WDL per nursing flow sheet. Last BM not reported on nursing flow sheet, discussed with RN, RN unsure of last BM. No known food allergies/intolerance reported per grandson, per medical record.     Grandson states he tries to limit sugary foods, drinks in pt's diet but could do better. Grandson receptive to written instructional diet handout, dropped off by bedside.

## 2021-11-09 NOTE — PROGRESS NOTE ADULT - ASSESSMENT
80M with PMHx Alzheimers dementia, sinus node dysfunction s/p PPM, HTN, pre-DM, severe AS, presenting with chest pain x4 days.    EKG: NSR lateral TWI    1. Chest pain  -trops 11-13  -[facility] in 4/2021 with non obstructive CAD   -echo with severe AS, normal LV function, no WMA. mild diastolic dysfunction    2. Aortic Stenosis  -hx of severe AS, planned for TAVR but patient did not follow up, has dementia not a candidate for TAVR    3. HTN  -controlled  -c/w norvasc and metoprolol  -continue to monitor BP     4. ABD Pain  -patient with pain in RUQ, ABD US shows Mildly distended gallbladder with gallbladder sludge.  -appreciate GI recs,   -s/p ERCP doing well   -cont abx    5. DVT Prophylaxis   -lovenox subq    dispo: home hospice

## 2021-11-09 NOTE — DISCHARGE NOTE PROVIDER - CARE PROVIDER_API CALL
Nishant Bee  CARDIOVASCULAR DISEASE  [address] [place] [address]  Phone: [phone]  Fax: [phone]  Follow Up Time:

## 2021-11-09 NOTE — DIETITIAN INITIAL EVALUATION ADULT. - NUTRITIONGOAL OUTCOME1
Pt to consume 75% or > of estimated nutritional needs during this hospitalization. PO intake >75% of meals & PO supplement

## 2021-11-09 NOTE — PROGRESS NOTE ADULT - ASSESSMENT
80M with PMHx Alzheimers dementia, sinus node dysfunction s/p PPM, HTN, pre-DM, severe AS, presenting with chest pain and noted with increased liver enzymes     Increased Liver Enzymes   2/2 CBD stone s/p ERCP with biliary sphincterotomy   complete Abx through 11/9; appreciate ID recs  Trend LFTS; Bilirubin downtrending appropriately   pain control prn   keep stools soft   diet as tolerated     Chest Pain   cardiology work up appreciated; not TAVR candidate     I reviewed the overnight course of events on the unit, re-confirming the patient history. I discussed the care with the patient and their family. The plan of care was discussed with the physician assistant and modifications were made to the notation where appropriate. Differential diagnosis and plan of care discussed with patient after the evaluation. Advanced care planning was discussed with patient and family.  Advanced care planning forms were reviewed and discussed.  Risks, benefits and alternatives of gastroenterologic procedures were discussed in detail and all questions were answered. 35 minutes spent on total encounter of which more than fifty percent of the encounter was spent counseling and/or coordinating care by the attending physician.

## 2021-11-14 ENCOUNTER — TRANSCRIPTION ENCOUNTER (OUTPATIENT)
Age: 81
End: 2021-11-14

## 2021-12-19 ENCOUNTER — EMERGENCY (EMERGENCY)
Facility: HOSPITAL | Age: 81
LOS: 1 days | Discharge: HOME | End: 2021-12-19
Attending: EMERGENCY MEDICINE | Admitting: EMERGENCY MEDICINE
Payer: MEDICARE

## 2021-12-19 VITALS
TEMPERATURE: 98 F | RESPIRATION RATE: 18 BRPM | HEART RATE: 91 BPM | HEIGHT: 65 IN | SYSTOLIC BLOOD PRESSURE: 147 MMHG | OXYGEN SATURATION: 100 % | DIASTOLIC BLOOD PRESSURE: 82 MMHG

## 2021-12-19 DIAGNOSIS — Z95.0 PRESENCE OF CARDIAC PACEMAKER: Chronic | ICD-10-CM

## 2021-12-19 LAB
BASOPHILS # BLD AUTO: 0.04 K/UL — SIGNIFICANT CHANGE UP (ref 0–0.2)
BASOPHILS NFR BLD AUTO: 0.3 % — SIGNIFICANT CHANGE UP (ref 0–2)
EOSINOPHIL # BLD AUTO: 0.04 K/UL — SIGNIFICANT CHANGE UP (ref 0–0.5)
EOSINOPHIL NFR BLD AUTO: 0.3 % — SIGNIFICANT CHANGE UP (ref 0–6)
HCT VFR BLD CALC: 38.9 % — LOW (ref 39–50)
HGB BLD-MCNC: 13.3 G/DL — SIGNIFICANT CHANGE UP (ref 13–17)
IANC: 8.81 K/UL — HIGH (ref 1.5–8.5)
IMM GRANULOCYTES NFR BLD AUTO: 0.7 % — SIGNIFICANT CHANGE UP (ref 0–1.5)
LYMPHOCYTES # BLD AUTO: 16.7 % — SIGNIFICANT CHANGE UP (ref 13–44)
LYMPHOCYTES # BLD AUTO: 2.04 K/UL — SIGNIFICANT CHANGE UP (ref 1–3.3)
MCHC RBC-ENTMCNC: 31.9 PG — SIGNIFICANT CHANGE UP (ref 27–34)
MCHC RBC-ENTMCNC: 34.2 GM/DL — SIGNIFICANT CHANGE UP (ref 32–36)
MCV RBC AUTO: 93.3 FL — SIGNIFICANT CHANGE UP (ref 80–100)
MONOCYTES # BLD AUTO: 1.21 K/UL — HIGH (ref 0–0.9)
MONOCYTES NFR BLD AUTO: 9.9 % — SIGNIFICANT CHANGE UP (ref 2–14)
NEUTROPHILS # BLD AUTO: 8.81 K/UL — HIGH (ref 1.8–7.4)
NEUTROPHILS NFR BLD AUTO: 72.1 % — SIGNIFICANT CHANGE UP (ref 43–77)
NRBC # BLD: 0 /100 WBCS — SIGNIFICANT CHANGE UP
NRBC # FLD: 0 K/UL — SIGNIFICANT CHANGE UP
PLATELET # BLD AUTO: 296 K/UL — SIGNIFICANT CHANGE UP (ref 150–400)
RBC # BLD: 4.17 M/UL — LOW (ref 4.2–5.8)
RBC # FLD: 14.4 % — SIGNIFICANT CHANGE UP (ref 10.3–14.5)
WBC # BLD: 12.22 K/UL — HIGH (ref 3.8–10.5)
WBC # FLD AUTO: 12.22 K/UL — HIGH (ref 3.8–10.5)

## 2021-12-19 PROCEDURE — 99285 EMERGENCY DEPT VISIT HI MDM: CPT | Mod: 25

## 2021-12-19 PROCEDURE — 93010 ELECTROCARDIOGRAM REPORT: CPT | Mod: 76

## 2021-12-19 NOTE — ED ADULT TRIAGE NOTE - CHIEF COMPLAINT QUOTE
decreased PO over the last few days - airway patent, no respiratory distress - HX diabetes, Alzheimer's, pacemaker - awake, alert appears comfortable in triage - granddaughter pt has been reporting back pain, knee pain, chest pain, neck pain

## 2021-12-19 NOTE — ED ADULT NURSE NOTE - NSICDXPASTMEDICALHX_GEN_ALL_CORE_FT
From: Karen Bradshwa  Sent: 7/27/2018 10:14 AM EDT  Subject: Medication Renewal Request    Karen rFanconikki would like a refill of the following medications:     HYDROcodone-acetaminophen (NORCO) 5-325 MG per tablet Maricruz Nichole MD]   Patient Comment: i am asking for this this early because you aren't open sat or sun. if i ask for it on monday and it takes 1-2 days to complete,i may not get the refill before running out of my current pills. i won't  this refill until monday or tuesday.     Preferred pharmacy: [address],, [facility] - [address] [phone] PAST MEDICAL HISTORY:  Alzheimer's disease     HTN (hypertension)     Prediabetes     Severe aortic stenosis

## 2021-12-19 NOTE — ED PROVIDER NOTE - PROGRESS NOTE DETAILS
Mynor RAMIREZ (PGY-3): Patient tolerated PO without difficulty, labs non-actionable, CT w/ +findings of sialadenitis. Treatment discussed over the phone with family member. Discussed strict return precautions and follow-up instructions. Medically stable for discharge.

## 2021-12-19 NOTE — ED PROVIDER NOTE - OBJECTIVE STATEMENT
81y M w/ PMHx Alzheimers dementia, sinus node dysfunction s/p PPM, HTN, pre-DM, severe AS, presenting with decreased PO intake x1 week and intermittent chest pain and sob. Granddaughter at bedside to provide collateral. States he has had chest pain and sob on and off over past year since having PM placed and has not worsened in nature. Patient states his pain is L-sided, intermittent, non-radiating, non-exertional and without associated nausea, vomiting, focal numbness or weakness. Patient's granddaughter states today he was refusing to eat because he was having throat pain. Denies fever, chills, cough, current chest pain or shortness of breath, nausea, vomiting, diarrhea, bloody stools, dysuria, hematuria or recent falls. Patient ambulates with cane at baseline. Was admitted to [facility] in October for ACS w/u was resultantly found to have CBD stone s/p ERCP 81y M w/ PMHx Alzheimers dementia, sinus node dysfunction s/p PPM, HTN, pre-DM, severe AS, presenting with decreased PO intake x1 week and intermittent chest pain and sob. Granddaughter at bedside to provide collateral. States he has had chest pain and sob on and off over past year since having PM placed and has not worsened in nature. Patient states his pain is L-sided, intermittent, non-radiating, non-exertional and without associated nausea, vomiting, focal numbness or weakness. Patient's granddaughter states today he was refusing to eat because he was having throat pain. Denies fever, chills, cough, current chest pain or shortness of breath, nausea, abdominal pain, vomiting, diarrhea, bloody stools, dysuria, hematuria or recent falls. Patient ambulates with cane at baseline. Was admitted to [facility] in October for ACS w/u was resultantly found to have CBD stone s/p ERCP. Received first dose of COVID vaccine.

## 2021-12-19 NOTE — ED PROVIDER NOTE - NS ED ROS FT
CONSTITUTIONAL: No fevers, no chills, no lightheadedness, no dizziness  NOSE: no nasal congestion  MOUTH/THROAT: no sore throat  CV: +intermittent chest pain, no palpitations  RESP: +intermittent SOB, no cough  GI: No n/v/d, no abd pain  : no dysuria, no hematuria, no flank pain  MSK: no back pain, no extremity pain  SKIN: no rashes  NEURO: no headache, no focal weakness, no decreased sensation/paresthesias

## 2021-12-19 NOTE — ED ADULT NURSE NOTE - NSFALLRSKOUTCOME_ED_ALL_ED
Start the Pre op Diet now Add Vitamin D3 5000 iu every day to your vitamin regimen You are required to be tested for COVID-19 prior to surgery. Testing must be done at [facility] 96 hours prior to surgery. You will be contacted by preadmission testing for a date and time. Failure to test and or a positive test will result in cancellation of surgery. Day Before Surgery: 
 -  take (2) Extra Strength Tylenol (acetaminophen) at noon and 8 pm  
 -  you may drink sugar free clear liquids until 3 hours prior to surgery  your after surgery prescriptions BEFORE surgery Make your 2, 4 and 6 week appointments for after surgery Sign up for My Chart We have started a Zoom Support Group and it will be the 2nd Thursday of the Month from 6-7 pm  
The next one is 10/8/20 6-7 pm  
You can access the link at [email or web address] Go to the Calendar tab and click on the date and it should go right to the link Vitamins:  
 
For an \"all in one\" bariatric supplement go to www. [email or web address]. com and look for the Opurity Multivitamins with iron for gastric bypass. You can take 2 capsules or 1 chewable and get what you need for a reasonable cost about $35 for a 3 months supply
Fall with Harm Risk

## 2021-12-19 NOTE — ED ADULT NURSE NOTE - OBJECTIVE STATEMENT
81M with hx of Alzheimer's dementia, HTN, , Sinus node dysfunction coming in for  right  jaw pain, and decreased po intake for 1 week. Pt states he had left intermittent chest pain, pt denies nausea, vomiting, numbness, tingling. AS per granddaughter he has been refusing to eat because of pain with eating. Denies fever, chills, cough, current chest pain or shortness of breath, nausea, abdominal pain, vomiting, diarrhea, bloody stools, dysuria, hematuria or recent falls. 20g placed in left a/c labs sent, NSR on cardiac monitor, will monitor pt.

## 2021-12-19 NOTE — ED ADULT NURSE NOTE - NSIMPLEMENTINTERV_GEN_ALL_ED
Implemented All Fall with Harm Risk Interventions:  [place] to call system. Call bell, personal items and telephone within reach. Instruct patient to call for assistance. Room bathroom lighting operational. Non-slip footwear when patient is off stretcher. Physically safe environment: no spills, clutter or unnecessary equipment. Stretcher in lowest position, wheels locked, appropriate side rails in place. Provide visual cue, wrist band, yellow gown, etc. Monitor gait and stability. Monitor for mental status changes and reorient to person, place, and time. Review medications for side effects contributing to fall risk. Reinforce activity limits and safety measures with patient and family. Provide visual clues: red socks.

## 2021-12-19 NOTE — ED PROVIDER NOTE - CLINICAL SUMMARY MEDICAL DECISION MAKING FREE TEXT BOX
81y M w/ PMHx Alzheimers dementia, sinus node dysfunction s/p PPM, HTN, pre-DM, severe AS, presenting with decreased PO intake x1 week and intermittent chest pain and sob. Granddaughter at bedside to provide collateral. Patient well-appearing, VS WNL. Abd soft, NT, lungs CTAB, will obtain infectious w/u along w/ trop given complaint of chest pain and severe AS, CXR, EKG, UA/UCx and PO challenge. Pending labs and passing PO, possible dc home with PMD f/u. Has home hospice.

## 2021-12-19 NOTE — ED PROVIDER NOTE - NSFOLLOWUPINSTRUCTIONS_ED_ALL_ED_FT
- Lab and imaging results, if performed, were discussed with you along with your discharge diagnosis    - You have been diagnosed with sialadenitis, which means you have a small stone blocking your salivary gland. These stones will pass on their own. To help facilitate the passage of the stone, sour candies (lemon drops) to help increase saliva from the gland will help pass the stone. If you develop fever, chills, worsening swelling or inability to tolerate eating or drinking, please return to the ED.     - Follow up with your doctor in 1 week - bring copies of your results with you    - Continue all prescribed medications    - Take ibuprofen/tylenol as directed as needed for pain  To control your pain at home, you should take Ibuprofen 400 mg along with Tylenol 650mg-1000mg every 6 to 8 hours. Limit your maximum daily Tylenol from all sources to 4000mg. Be aware that many other medications contain acetaminophen which is also known as Tylenol. Taking Tylenol and Ibuprofen together has been shown to be more effective at relieving pain than taking them separately. These are both over the counter medications that you can  at your local pharmacy without a prescription. You need to respect all of the warnings on the bottles. You shouldn’t take these medications for more than a week without following up with your doctor. Both medications come with certain risks and side effects that you need to discuss with your doctor, especially if you are taking them for a prolonged period.    - Rest and keep yourself hydrated with fluids

## 2021-12-19 NOTE — ED PROVIDER NOTE - PATIENT PORTAL LINK FT
You can access the FollowMyHealth Patient Portal offered by [facility] by registering at the following website: [email or web address]. By joining [employer]’s FollowMyHealth portal, you will also be able to view your health information using other applications (apps) compatible with our system. You can access the FollowMyHealth Patient Portal offered by [facility] by registering at the following website: [email or web address]. By joining [employer]’s FollowMyHealth portal, you will also be able to view your health information using other applications (apps) compatible with our system.

## 2021-12-19 NOTE — ED PROVIDER NOTE - ATTENDING CONTRIBUTION TO CARE
Afebrile. Awake and Alert. Lungs CTA. Heart RRR. Abdomen soft NTND. CN II-XII grossly intact. Moves all extremities without lateralization.    Sore throat  Oropharynx clear    Chest pain: chronic  Home hospice Afebrile. Awake and Alert. Palpable well-circumscribed mass right submandibular space, no overlying erythema. Pt unable to comply with exam of oropharynx due to gag reflex. Lungs CTA. Heart RRR. Abdomen soft NTND. CN II-XII grossly intact. Moves all extremities without lateralization.    Sore throat  r/o sialadenitis  r/o reactive lymphadenopathy    Chest pain: chronic  New TWI in inferior leads Afebrile. Awake and Alert. Palpable well-circumscribed mass right submandibular space, no overlying erythema. Pt unable to comply with exam of oropharynx due to gag reflex. Lungs CTA. Heart RRR. Abdomen soft NTND. CN II-XII grossly intact. Moves all extremities without lateralization.    Sore throat  r/o sialadenitis  r/o reactive lymphadenopathy    Chest pain: chronic  Trend troponin

## 2021-12-19 NOTE — ED PROVIDER NOTE - PHYSICAL EXAMINATION
Physical Exam:  Gen: NAD, non-toxic appearing, able to ambulate with cane (baseline)   HEENT: normal conjunctiva, tongue midline, oral mucosa moist, +swollen tender lymph node to R submandibular space, trachea midline, tolerating secretions   Lung: CTAB, no respiratory distress, no wheezes/rhonchi/rales B/L, speaking in full sentences  CV: RRR, no murmurs, rubs or gallops  Abd: soft, NT, ND, no guarding, no rigidity, no CVA tenderness   MSK: no visible deformities, ROM normal in UE/LE  Neuro: No focal sensory or motor deficits  Skin: Warm, well perfused, no rash, no leg swelling  Psych: normal affect, calm  Holly Lowe D.O. -Resident

## 2021-12-20 VITALS
RESPIRATION RATE: 15 BRPM | OXYGEN SATURATION: 100 % | TEMPERATURE: 98 F | SYSTOLIC BLOOD PRESSURE: 145 MMHG | DIASTOLIC BLOOD PRESSURE: 68 MMHG | HEART RATE: 66 BPM

## 2021-12-20 LAB
ALBUMIN SERPL ELPH-MCNC: 3.4 G/DL — SIGNIFICANT CHANGE UP (ref 3.3–5)
ALP SERPL-CCNC: 90 U/L — SIGNIFICANT CHANGE UP (ref 40–120)
ALT FLD-CCNC: 28 U/L — SIGNIFICANT CHANGE UP (ref 4–41)
ANION GAP SERPL CALC-SCNC: 12 MMOL/L — SIGNIFICANT CHANGE UP (ref 7–14)
AST SERPL-CCNC: 23 U/L — SIGNIFICANT CHANGE UP (ref 4–40)
BILIRUB SERPL-MCNC: 1.3 MG/DL — HIGH (ref 0.2–1.2)
BUN SERPL-MCNC: 24 MG/DL — HIGH (ref 7–23)
CALCIUM SERPL-MCNC: 9.7 MG/DL — SIGNIFICANT CHANGE UP (ref 8.4–10.5)
CHLORIDE SERPL-SCNC: 99 MMOL/L — SIGNIFICANT CHANGE UP (ref 98–107)
CO2 SERPL-SCNC: 25 MMOL/L — SIGNIFICANT CHANGE UP (ref 22–31)
CREAT SERPL-MCNC: 0.73 MG/DL — SIGNIFICANT CHANGE UP (ref 0.5–1.3)
GLUCOSE SERPL-MCNC: 101 MG/DL — HIGH (ref 70–99)
POTASSIUM SERPL-MCNC: 4 MMOL/L — SIGNIFICANT CHANGE UP (ref 3.5–5.3)
POTASSIUM SERPL-SCNC: 4 MMOL/L — SIGNIFICANT CHANGE UP (ref 3.5–5.3)
PROT SERPL-MCNC: 8.6 G/DL — HIGH (ref 6–8.3)
SODIUM SERPL-SCNC: 136 MMOL/L — SIGNIFICANT CHANGE UP (ref 135–145)
TROPONIN T, HIGH SENSITIVITY RESULT: 10 NG/L — SIGNIFICANT CHANGE UP

## 2021-12-20 PROCEDURE — 70491 CT SOFT TISSUE NECK W/DYE: CPT | Mod: 26,MA

## 2021-12-20 PROCEDURE — 71045 X-RAY EXAM CHEST 1 VIEW: CPT | Mod: 26

## 2021-12-20 RX ORDER — ACETAMINOPHEN 500 MG
650 TABLET ORAL ONCE
Refills: 0 | Status: COMPLETED | OUTPATIENT
Start: 2021-12-20 | End: 2021-12-20

## 2021-12-20 RX ORDER — SODIUM CHLORIDE 9 MG/ML
1000 INJECTION INTRAMUSCULAR; INTRAVENOUS; SUBCUTANEOUS ONCE
Refills: 0 | Status: COMPLETED | OUTPATIENT
Start: 2021-12-20 | End: 2021-12-20

## 2021-12-20 RX ORDER — IBUPROFEN 200 MG
400 TABLET ORAL ONCE
Refills: 0 | Status: COMPLETED | OUTPATIENT
Start: 2021-12-20 | End: 2021-12-20

## 2021-12-20 RX ADMIN — Medication 400 MILLIGRAM(S): at 04:58

## 2021-12-20 RX ADMIN — SODIUM CHLORIDE 1000 MILLILITER(S): 9 INJECTION INTRAMUSCULAR; INTRAVENOUS; SUBCUTANEOUS at 00:29

## 2021-12-20 RX ADMIN — Medication 650 MILLIGRAM(S): at 00:29

## 2022-01-27 ENCOUNTER — APPOINTMENT (OUTPATIENT)
Dept: ELECTROPHYSIOLOGY | Facility: CLINIC | Age: 82
End: 2022-01-27

## 2022-02-03 ENCOUNTER — FORM ENCOUNTER (OUTPATIENT)
Age: 82
End: 2022-02-03

## 2022-02-28 ENCOUNTER — NON-APPOINTMENT (OUTPATIENT)
Age: 82
End: 2022-02-28

## 2022-02-28 ENCOUNTER — APPOINTMENT (OUTPATIENT)
Dept: ELECTROPHYSIOLOGY | Facility: CLINIC | Age: 82
End: 2022-02-28
Payer: MEDICARE

## 2022-02-28 PROCEDURE — 93294 REM INTERROG EVL PM/LDLS PM: CPT

## 2022-02-28 PROCEDURE — 93296 REM INTERROG EVL PM/IDS: CPT

## 2022-05-31 ENCOUNTER — APPOINTMENT (OUTPATIENT)
Dept: ELECTROPHYSIOLOGY | Facility: CLINIC | Age: 82
End: 2022-05-31
Payer: MEDICARE

## 2022-05-31 ENCOUNTER — NON-APPOINTMENT (OUTPATIENT)
Age: 82
End: 2022-05-31

## 2022-05-31 PROCEDURE — 93296 REM INTERROG EVL PM/IDS: CPT

## 2022-05-31 PROCEDURE — 93294 REM INTERROG EVL PM/LDLS PM: CPT

## 2022-08-30 ENCOUNTER — APPOINTMENT (OUTPATIENT)
Dept: ELECTROPHYSIOLOGY | Facility: CLINIC | Age: 82
End: 2022-08-30

## 2022-08-30 ENCOUNTER — NON-APPOINTMENT (OUTPATIENT)
Age: 82
End: 2022-08-30

## 2022-08-30 PROCEDURE — 93296 REM INTERROG EVL PM/IDS: CPT

## 2022-08-30 PROCEDURE — 93294 REM INTERROG EVL PM/LDLS PM: CPT

## 2022-10-10 NOTE — HOSPICE CARE NOTE - CONVESATION DETAILS
-	At this time, due to critical shortage staffing in the area, we are unable to provide the best services needed for the patient in question. We suggest the referral sent to other hospices if hospice is the patients/families desired d/c plan. CM aware.   Sarah Justin LMSW Continue taking all previous prescribed medications as directed.    Take ibuprofen as needed for pain, was not taking any Tylenol secondary to pain and right upper quadrant and due to possible liver involvement.    Follow-up with primary care provider and with her oncologist for further evaluation and possible testing is needed as recommended per CT of the abdomen for MRI of the abdomen.    Follow-up emergency room if you continue to have pain lasting longer than 2-3 days, or any new or other symptoms develop.

## 2022-10-31 NOTE — DISCHARGE NOTE PROVIDER - DISCHARGE DIET
Problem: Chronic Conditions and Co-morbidities  Goal: Patient's chronic conditions and co-morbidity symptoms are monitored and maintained or improved  Outcome: Progressing     Problem: Chronic Conditions and Co-morbidities  Goal: Patient's chronic conditions and co-morbidity symptoms are monitored and maintained or improved  Outcome: Progressing     Problem: Chronic Conditions and Co-morbidities  Goal: Patient's chronic conditions and co-morbidity symptoms are monitored and maintained or improved  Outcome: Progressing     Problem: Pain  Goal: Verbalizes/displays adequate comfort level or baseline comfort level  Outcome: Progressing
DASH Diet

## 2022-11-08 ENCOUNTER — INPATIENT (INPATIENT)
Facility: HOSPITAL | Age: 82
LOS: 5 days | Discharge: HOSPICE | End: 2022-11-14
Attending: GENERAL ACUTE CARE HOSPITAL | Admitting: GENERAL ACUTE CARE HOSPITAL

## 2022-11-08 VITALS
TEMPERATURE: 98 F | RESPIRATION RATE: 18 BRPM | OXYGEN SATURATION: 100 % | HEART RATE: 60 BPM | DIASTOLIC BLOOD PRESSURE: 70 MMHG | SYSTOLIC BLOOD PRESSURE: 147 MMHG

## 2022-11-08 DIAGNOSIS — Z95.0 PRESENCE OF CARDIAC PACEMAKER: Chronic | ICD-10-CM

## 2022-11-08 LAB
APTT BLD: 30.1 SEC — SIGNIFICANT CHANGE UP (ref 27–36.3)
BASE EXCESS BLDV CALC-SCNC: -0.7 MMOL/L — SIGNIFICANT CHANGE UP (ref -2–3)
BASOPHILS # BLD AUTO: 0.04 K/UL — SIGNIFICANT CHANGE UP (ref 0–0.2)
BASOPHILS NFR BLD AUTO: 0.6 % — SIGNIFICANT CHANGE UP (ref 0–2)
BLOOD GAS VENOUS COMPREHENSIVE RESULT: SIGNIFICANT CHANGE UP
CHLORIDE BLDV-SCNC: 105 MMOL/L — SIGNIFICANT CHANGE UP (ref 96–108)
CO2 BLDV-SCNC: 25.4 MMOL/L — SIGNIFICANT CHANGE UP (ref 22–26)
EOSINOPHIL # BLD AUTO: 0.15 K/UL — SIGNIFICANT CHANGE UP (ref 0–0.5)
EOSINOPHIL NFR BLD AUTO: 2.4 % — SIGNIFICANT CHANGE UP (ref 0–6)
GAS PNL BLDV: 136 MMOL/L — SIGNIFICANT CHANGE UP (ref 136–145)
GLUCOSE BLDV-MCNC: 103 MG/DL — HIGH (ref 70–99)
HCO3 BLDV-SCNC: 24 MMOL/L — SIGNIFICANT CHANGE UP (ref 22–29)
HCT VFR BLD CALC: 37.3 % — LOW (ref 39–50)
HCT VFR BLDA CALC: 38 % — LOW (ref 39–51)
HGB BLD CALC-MCNC: 12.5 G/DL — LOW (ref 13–17)
HGB BLD-MCNC: 12.6 G/DL — LOW (ref 13–17)
IANC: 2.58 K/UL — SIGNIFICANT CHANGE UP (ref 1.8–7.4)
IMM GRANULOCYTES NFR BLD AUTO: 0.3 % — SIGNIFICANT CHANGE UP (ref 0–0.9)
INR BLD: 1.1 RATIO — SIGNIFICANT CHANGE UP (ref 0.88–1.16)
LACTATE BLDV-MCNC: 1.2 MMOL/L — SIGNIFICANT CHANGE UP (ref 0.5–2)
LYMPHOCYTES # BLD AUTO: 2.65 K/UL — SIGNIFICANT CHANGE UP (ref 1–3.3)
LYMPHOCYTES # BLD AUTO: 42.7 % — SIGNIFICANT CHANGE UP (ref 13–44)
MCHC RBC-ENTMCNC: 31 PG — SIGNIFICANT CHANGE UP (ref 27–34)
MCHC RBC-ENTMCNC: 33.8 GM/DL — SIGNIFICANT CHANGE UP (ref 32–36)
MCV RBC AUTO: 91.9 FL — SIGNIFICANT CHANGE UP (ref 80–100)
MONOCYTES # BLD AUTO: 0.77 K/UL — SIGNIFICANT CHANGE UP (ref 0–0.9)
MONOCYTES NFR BLD AUTO: 12.4 % — SIGNIFICANT CHANGE UP (ref 2–14)
NEUTROPHILS # BLD AUTO: 2.58 K/UL — SIGNIFICANT CHANGE UP (ref 1.8–7.4)
NEUTROPHILS NFR BLD AUTO: 41.6 % — LOW (ref 43–77)
NRBC # BLD: 0 /100 WBCS — SIGNIFICANT CHANGE UP (ref 0–0)
NRBC # FLD: 0 K/UL — SIGNIFICANT CHANGE UP (ref 0–0)
PCO2 BLDV: 40 MMHG — LOW (ref 42–55)
PH BLDV: 7.39 — SIGNIFICANT CHANGE UP (ref 7.32–7.43)
PLATELET # BLD AUTO: 203 K/UL — SIGNIFICANT CHANGE UP (ref 150–400)
PO2 BLDV: 53 MMHG — SIGNIFICANT CHANGE UP
POTASSIUM BLDV-SCNC: 3.7 MMOL/L — SIGNIFICANT CHANGE UP (ref 3.5–5.1)
PROTHROM AB SERPL-ACNC: 12.8 SEC — SIGNIFICANT CHANGE UP (ref 10.5–13.4)
RBC # BLD: 4.06 M/UL — LOW (ref 4.2–5.8)
RBC # FLD: 13.5 % — SIGNIFICANT CHANGE UP (ref 10.3–14.5)
SAO2 % BLDV: 87.6 % — SIGNIFICANT CHANGE UP
UFH PPP CHRO-ACNC: 0 IU/ML — LOW (ref 0.3–0.7)
WBC # BLD: 6.21 K/UL — SIGNIFICANT CHANGE UP (ref 3.8–10.5)
WBC # FLD AUTO: 6.21 K/UL — SIGNIFICANT CHANGE UP (ref 3.8–10.5)

## 2022-11-08 PROCEDURE — 99285 EMERGENCY DEPT VISIT HI MDM: CPT

## 2022-11-08 PROCEDURE — 93010 ELECTROCARDIOGRAM REPORT: CPT

## 2022-11-08 PROCEDURE — 71045 X-RAY EXAM CHEST 1 VIEW: CPT | Mod: 26

## 2022-11-08 RX ORDER — ASPIRIN/CALCIUM CARB/MAGNESIUM 324 MG
162 TABLET ORAL ONCE
Refills: 0 | Status: COMPLETED | OUTPATIENT
Start: 2022-11-08 | End: 2022-11-08

## 2022-11-08 RX ADMIN — Medication 162 MILLIGRAM(S): at 23:05

## 2022-11-08 NOTE — ED PROVIDER NOTE - NS ED ROS FT
denies fever, chills, +chest pain, SOB, abdominal pain, diarrhea, dysuria, syncope, bleeding, new rash,weakness, numbness, blurred vision  + fall   ROS  otherwise negative as per HPI

## 2022-11-08 NOTE — ED PROVIDER NOTE - OBJECTIVE STATEMENT
81y M w/ PMHx Alzheimers dementia, sinus node dysfunction s/p PPM, HTN, pre-DM, severe AS p/w  chest pain and shortness of breath unrelieved by nitroglycerin tabs . Pt states he has also had some falls out of his bed, unknown if + LOC. pt family states pt has been more confused in recent days.Patient poor historian but states he began having some chest pain which was severe located in the left chest patient states he fell over when he had the chest pain family unsure if he had positive LOC patient was given nitroglycerin but did not improve his chest pain states chest pain has improved since arrival EKG showing new T wave inversions V4 through V6

## 2022-11-08 NOTE — ED PROVIDER NOTE - PHYSICAL EXAMINATION
Gen: Awake, Alert, WD, WN, NAD  Head:  NC/AT  Eyes:  PERRL, EOMI, Conjunctiva pink, lids normal, no scleral icterus  ENT: OP clear, moist mucus membranes  Neck: supple, nontender, no meningismus, no JVD, trachea midline  Cardiac/CV:  S1 S2, RRR, no M/G/R  Respiratory/Pulm:  CTAB, good air movement, normal resp effort, no wheezes/stridor/retractions/rales/rhonchi  Gastrointestinal/Abdomen:  Soft, nontender, nondistended, +BS, no rebound/guarding  Back:  no CVAT, no MLT  Ext:  warm, well perfused, moving all extremities spontaneously, no peripheral edema, distal pulses intact  Skin: intact, no rash  Neuro:  AAOx2, sensation intact, motor 5/5 x 4 extremities, normal gait, speech clear

## 2022-11-08 NOTE — ED ADULT NURSE NOTE - CHIEF COMPLAINT QUOTE
Brought in by family for chest pain and shortness of breath unrelieved by nitroglycerin tabs x 2. As per family, patient has been more disoriented recently. Patient admits to fall, out of bed, unknown LOC or head strike, denies blood thinner. pmhx: pacemaker, dementia, HTN. Charge RN made aware of abnormal EKG.

## 2022-11-08 NOTE — ED ADULT TRIAGE NOTE - CHIEF COMPLAINT QUOTE
Brought in by family for chest pain and shortness of breath unrelieved by nitroglycerin tabs x 2. As per family, patient has been more disoriented recently. Patient admits to fall, out of bed, unknown LOC or head strike, denies blood thinner. pmhx: pacemaker, dementia, HTN Brought in by family for chest pain and shortness of breath unrelieved by nitroglycerin tabs x 2. As per family, patient has been more disoriented recently. Patient admits to fall, out of bed, unknown LOC or head strike, denies blood thinner. pmhx: pacemaker, dementia, HTN. Charge RN made aware of abnormal EKG. Brought in by family for chest pain and shortness of breath unrelieved by nitroglycerin tabs x 2. As per family, patient has been more disoriented recently. Patient admits to fall out of bed today, unknown LOC or head strike, denies blood thinner. pmhx: pacemaker, dementia, HTN. Charge RN made aware of abnormal EKG.

## 2022-11-08 NOTE — ED ADULT NURSE NOTE - OBJECTIVE STATEMENT
Break RN note- Patient arrives to the ED for chest pain and SOB earlier today. A&Ox1 (oriented to self). Patient currently denying chest pain or SOB at this time. Per son at bedside, patient has been having intermittently chest pain for the past week. Son has been giving patient nitroglycerin with some relief. Patient has a pacemaker. Per Son, Patient reported he fell out of bed this morning and was complaining of SOB. Patient denies any sx at this time. Patient breathing even and nonlabored. No acute distress. Belly soft, nondistended and nontender. Patient ambulates with a cane. Cardiac monitor in place- paced rhythm. 20g IV placed to left arm. Labs sent as ordered. COVID swab sent. Patient medicated as ordered. Safety maintained. Patient stable upon exiting the room.

## 2022-11-08 NOTE — ED ADULT NURSE NOTE - NSIMPLEMENTINTERV_GEN_ALL_ED
Implemented All Fall Risk Interventions:  [place] to call system. Call bell, personal items and telephone within reach. Instruct patient to call for assistance. Room bathroom lighting operational. Non-slip footwear when patient is off stretcher. Physically safe environment: no spills, clutter or unnecessary equipment. Stretcher in lowest position, wheels locked, appropriate side rails in place. Provide visual cue, wrist band, yellow gown, etc. Monitor gait and stability. Monitor for mental status changes and reorient to person, place, and time. Review medications for side effects contributing to fall risk. Reinforce activity limits and safety measures with patient and family.

## 2022-11-08 NOTE — ED PROVIDER NOTE - ATTENDING CONTRIBUTION TO CARE
attedending wrote note  Dr. Eason: I have personally seen and examined this patient at the bedside. I have fully participated in the care of this patient. I have reviewed all pertinent clinical information, including history, physical exam, plan and the Resident's note and agree except as noted. HPI above as by me. PE above as by me. DDX PLAN

## 2022-11-08 NOTE — ED PROVIDER NOTE - CLINICAL SUMMARY MEDICAL DECISION MAKING FREE TEXT BOX
81y M w/ PMHx Alzheimers dementia, sinus node dysfunction s/p PPM, HTN, pre-DM, severe AS p/w  chest pain and shortness of breath unrelieved by nitroglycerin tabs . Pt states he has also had some falls out of his bed, unknown if + LOC. pt family states pt has been more confused in recent days.   pt w/ new ekg changes and chest pain along with recent fall. normal neuro exam, aaxo2-3 , will obtain cbc, cmp, trop, r/o acs, admission

## 2022-11-08 NOTE — ED PROVIDER NOTE - PROGRESS NOTE DETAILS
ISSAC Lawler (PGY-3) - pt w/ non-actionable trop, given new EKG changes, will admit for cardiac work up.

## 2022-11-09 DIAGNOSIS — I20.9 ANGINA PECTORIS, UNSPECIFIED: ICD-10-CM

## 2022-11-09 DIAGNOSIS — I10 ESSENTIAL (PRIMARY) HYPERTENSION: ICD-10-CM

## 2022-11-09 DIAGNOSIS — R07.9 CHEST PAIN, UNSPECIFIED: ICD-10-CM

## 2022-11-09 LAB
ALBUMIN SERPL ELPH-MCNC: 4 G/DL — SIGNIFICANT CHANGE UP (ref 3.3–5)
ALP SERPL-CCNC: 57 U/L — SIGNIFICANT CHANGE UP (ref 40–120)
ALT FLD-CCNC: 14 U/L — SIGNIFICANT CHANGE UP (ref 4–41)
ANION GAP SERPL CALC-SCNC: 11 MMOL/L — SIGNIFICANT CHANGE UP (ref 7–14)
ANION GAP SERPL CALC-SCNC: 11 MMOL/L — SIGNIFICANT CHANGE UP (ref 7–14)
AST SERPL-CCNC: 13 U/L — SIGNIFICANT CHANGE UP (ref 4–40)
BILIRUB SERPL-MCNC: 0.3 MG/DL — SIGNIFICANT CHANGE UP (ref 0.2–1.2)
BUN SERPL-MCNC: 13 MG/DL — SIGNIFICANT CHANGE UP (ref 7–23)
BUN SERPL-MCNC: 17 MG/DL — SIGNIFICANT CHANGE UP (ref 7–23)
CALCIUM SERPL-MCNC: 9.7 MG/DL — SIGNIFICANT CHANGE UP (ref 8.4–10.5)
CALCIUM SERPL-MCNC: 9.7 MG/DL — SIGNIFICANT CHANGE UP (ref 8.4–10.5)
CHLORIDE SERPL-SCNC: 103 MMOL/L — SIGNIFICANT CHANGE UP (ref 98–107)
CHLORIDE SERPL-SCNC: 105 MMOL/L — SIGNIFICANT CHANGE UP (ref 98–107)
CO2 SERPL-SCNC: 22 MMOL/L — SIGNIFICANT CHANGE UP (ref 22–31)
CO2 SERPL-SCNC: 25 MMOL/L — SIGNIFICANT CHANGE UP (ref 22–31)
CREAT SERPL-MCNC: 0.69 MG/DL — SIGNIFICANT CHANGE UP (ref 0.5–1.3)
CREAT SERPL-MCNC: 0.71 MG/DL — SIGNIFICANT CHANGE UP (ref 0.5–1.3)
EGFR: 93 ML/MIN/1.73M2 — SIGNIFICANT CHANGE UP
EGFR: 94 ML/MIN/1.73M2 — SIGNIFICANT CHANGE UP
FLUAV AG NPH QL: SIGNIFICANT CHANGE UP
FLUBV AG NPH QL: SIGNIFICANT CHANGE UP
GLUCOSE SERPL-MCNC: 101 MG/DL — HIGH (ref 70–99)
GLUCOSE SERPL-MCNC: 104 MG/DL — HIGH (ref 70–99)
HCT VFR BLD CALC: 39.8 % — SIGNIFICANT CHANGE UP (ref 39–50)
HGB BLD-MCNC: 13.4 G/DL — SIGNIFICANT CHANGE UP (ref 13–17)
MAGNESIUM SERPL-MCNC: 2.2 MG/DL — SIGNIFICANT CHANGE UP (ref 1.6–2.6)
MAGNESIUM SERPL-MCNC: 2.2 MG/DL — SIGNIFICANT CHANGE UP (ref 1.6–2.6)
MCHC RBC-ENTMCNC: 30.8 PG — SIGNIFICANT CHANGE UP (ref 27–34)
MCHC RBC-ENTMCNC: 33.7 GM/DL — SIGNIFICANT CHANGE UP (ref 32–36)
MCV RBC AUTO: 91.5 FL — SIGNIFICANT CHANGE UP (ref 80–100)
NRBC # BLD: 0 /100 WBCS — SIGNIFICANT CHANGE UP (ref 0–0)
NRBC # FLD: 0 K/UL — SIGNIFICANT CHANGE UP (ref 0–0)
NT-PROBNP SERPL-SCNC: 96 PG/ML — SIGNIFICANT CHANGE UP
PHOSPHATE SERPL-MCNC: 2.6 MG/DL — SIGNIFICANT CHANGE UP (ref 2.5–4.5)
PLATELET # BLD AUTO: 208 K/UL — SIGNIFICANT CHANGE UP (ref 150–400)
POTASSIUM SERPL-MCNC: 3.9 MMOL/L — SIGNIFICANT CHANGE UP (ref 3.5–5.3)
POTASSIUM SERPL-MCNC: 3.9 MMOL/L — SIGNIFICANT CHANGE UP (ref 3.5–5.3)
POTASSIUM SERPL-SCNC: 3.9 MMOL/L — SIGNIFICANT CHANGE UP (ref 3.5–5.3)
POTASSIUM SERPL-SCNC: 3.9 MMOL/L — SIGNIFICANT CHANGE UP (ref 3.5–5.3)
PROT SERPL-MCNC: 7.3 G/DL — SIGNIFICANT CHANGE UP (ref 6–8.3)
RBC # BLD: 4.35 M/UL — SIGNIFICANT CHANGE UP (ref 4.2–5.8)
RBC # FLD: 13.5 % — SIGNIFICANT CHANGE UP (ref 10.3–14.5)
RSV RNA NPH QL NAA+NON-PROBE: SIGNIFICANT CHANGE UP
SARS-COV-2 RNA SPEC QL NAA+PROBE: SIGNIFICANT CHANGE UP
SODIUM SERPL-SCNC: 138 MMOL/L — SIGNIFICANT CHANGE UP (ref 135–145)
SODIUM SERPL-SCNC: 139 MMOL/L — SIGNIFICANT CHANGE UP (ref 135–145)
TROPONIN T, HIGH SENSITIVITY RESULT: 11 NG/L — SIGNIFICANT CHANGE UP
TROPONIN T, HIGH SENSITIVITY RESULT: 11 NG/L — SIGNIFICANT CHANGE UP
WBC # BLD: 8.66 K/UL — SIGNIFICANT CHANGE UP (ref 3.8–10.5)
WBC # FLD AUTO: 8.66 K/UL — SIGNIFICANT CHANGE UP (ref 3.8–10.5)

## 2022-11-09 PROCEDURE — 70450 CT HEAD/BRAIN W/O DYE: CPT | Mod: 26,MA

## 2022-11-09 PROCEDURE — 99223 1ST HOSP IP/OBS HIGH 75: CPT

## 2022-11-09 PROCEDURE — 93306 TTE W/DOPPLER COMPLETE: CPT | Mod: 26

## 2022-11-09 RX ORDER — SENNA PLUS 8.6 MG/1
2 TABLET ORAL AT BEDTIME
Refills: 0 | Status: DISCONTINUED | OUTPATIENT
Start: 2022-11-09 | End: 2022-11-14

## 2022-11-09 RX ORDER — LANOLIN ALCOHOL/MO/W.PET/CERES
3 CREAM (GRAM) TOPICAL AT BEDTIME
Refills: 0 | Status: DISCONTINUED | OUTPATIENT
Start: 2022-11-09 | End: 2022-11-14

## 2022-11-09 RX ORDER — INFLUENZA VIRUS VACCINE 15; 15; 15; 15 UG/.5ML; UG/.5ML; UG/.5ML; UG/.5ML
0.7 SUSPENSION INTRAMUSCULAR ONCE
Refills: 0 | Status: DISCONTINUED | OUTPATIENT
Start: 2022-11-09 | End: 2022-11-14

## 2022-11-09 RX ORDER — OLANZAPINE 15 MG/1
2.5 TABLET, FILM COATED ORAL ONCE
Refills: 0 | Status: COMPLETED | OUTPATIENT
Start: 2022-11-09 | End: 2022-11-09

## 2022-11-09 RX ORDER — ASPIRIN/CALCIUM CARB/MAGNESIUM 324 MG
81 TABLET ORAL DAILY
Refills: 0 | Status: DISCONTINUED | OUTPATIENT
Start: 2022-11-09 | End: 2022-11-14

## 2022-11-09 RX ORDER — AMLODIPINE BESYLATE 2.5 MG/1
5 TABLET ORAL DAILY
Refills: 0 | Status: DISCONTINUED | OUTPATIENT
Start: 2022-11-09 | End: 2022-11-14

## 2022-11-09 RX ORDER — METOPROLOL TARTRATE 50 MG
25 TABLET ORAL
Refills: 0 | Status: DISCONTINUED | OUTPATIENT
Start: 2022-11-09 | End: 2022-11-14

## 2022-11-09 RX ADMIN — OLANZAPINE 2.5 MILLIGRAM(S): 15 TABLET, FILM COATED ORAL at 14:56

## 2022-11-09 NOTE — H&P ADULT - ASSESSMENT
80 y/o M with HTN, severe AS (ORLY 0.8), SSS s/p PPM, and nonobstructive CAD p/w chest pain and EKG changes.

## 2022-11-09 NOTE — H&P ADULT - NSHPLABSRESULTS_GEN_ALL_CORE
11-08    138  |  105  |  17  ----------------------------<  101<H>  3.9   |  22  |  0.71    Ca    9.7      08 Nov 2022 23:07  Mg     2.20     11-08    TPro  7.3  /  Alb  4.0  /  TBili  0.3  /  DBili  x   /  AST  13  /  ALT  14  /  AlkPhos  57  11-08                            12.6   6.21  )-----------( 203      ( 08 Nov 2022 23:07 )             37.3           Serum Pro-Brain Natriuretic Peptide: 96 pg/mL (11-08-22 @ 23:07)      PT/INR - ( 08 Nov 2022 23:07 )   PT: 12.8 sec;   INR: 1.10 ratio         PTT - ( 08 Nov 2022 23:07 )  PTT:30.1 sec    Troponin T, High Sensitivity Result: 11    CXR film reviewed: clear lungs    EKG tracing reviewed NSR with TWI in anterolateral leads

## 2022-11-09 NOTE — PATIENT PROFILE ADULT - FUNCTIONAL ASSESSMENT - BASIC MOBILITY 6.
1-calculated by average/Not able to assess (calculate score using [facility] averaging method)

## 2022-11-09 NOTE — CONSULT NOTE ADULT - ASSESSMENT
80 y/o M with HTN, severe AS (ORLY 0.8), SSS s/p PPM, and nonobstructive CAD p/w chest pain.     EKG: paced TWI v4-v6      1. Chest pain  -trops 11-  -[facility] in 4/2021 with non obstructive CAD   -obtain echo     2. Aortic Stenosis  -hx of severe AS, planned for TAVR but patient did not follow up, has dementia deemed not a candidate for TAVR in past     3. HTN  -controlled  -continue to monitor BP

## 2022-11-09 NOTE — H&P ADULT - PROBLEM SELECTOR PLAN 1
- currently no pain  - will monitor on tele  - trend troponin  - will likely need cardiology eval in am  - TTE

## 2022-11-09 NOTE — CHART NOTE - NSCHARTNOTEFT_GEN_A_CORE
Called pharmacy - patient has not had any prescriptions filled since January  Called grandson - patient was recently on hospice but was doing well and discharged from hospice - currently at work and cannot confirm home meds at this time  Can attempt to reach carolyn after 7PM when he is back home from work     Polly Calderón PA-C  [id number]

## 2022-11-09 NOTE — H&P ADULT - NSHPPHYSICALEXAM_GEN_ALL_CORE
Vital Signs Last 24 Hrs  T(C): 36.5 (09 Nov 2022 06:59), Max: 36.8 (09 Nov 2022 05:20)  T(F): 97.7 (09 Nov 2022 06:59), Max: 98.2 (09 Nov 2022 05:20)  HR: 65 (09 Nov 2022 06:59) (60 - 71)  BP: 149/65 (09 Nov 2022 06:59) (147/70 - 159/74)  BP(mean): --  RR: 17 (09 Nov 2022 06:59) (16 - 18)  SpO2: 99% (09 Nov 2022 06:59) (99% - 100%)    Parameters below as of 09 Nov 2022 06:59  Patient On (Oxygen Delivery Method): room air        PHYSICAL EXAM:  GENERAL: No Acute Distress  EYES: conjunctiva and sclera clear  ENMT: Moist mucous membranes   NECK: Supple  PULMONARY: Clear to auscultation bilaterally  CARDIAC: Regular rate and rhythm; No murmurs, rubs, or gallops  GASTROINTESTINAL: Abdomen soft, Nontender, Nondistended; Bowel sounds normal  EXTREMITIES:   No clubbing, cyanosis, or pedal edema  PSYCH: Normal Affect, Normal Behavior  NEUROLOGY:   - Mental status A&O x 1   SKIN: No rashes or lesions  MUSCULOSKELETAL: No joint swelling

## 2022-11-09 NOTE — H&P ADULT - HISTORY OF PRESENT ILLNESS
80 y/o M with HTN, severe AS (ORLY 0.8), SSS s/p PPM, and nonobstructive CAD p/w chest pain.  Pt's grandson states that pt has occasional L sided chest pain for which he takes nitroglycerin.  Last night, he developed chest pain which was not relieved by NTG.  Grandson also states that pt has been more confused recently.  Pt has difficulty participating in history 2/2 dementia, but states he doest not have CP at this time.  He denies dyspnea, dizziness or palpitations.      In the ED, pt was given ASA 162mg.  CP resolved after arrival to ED.

## 2022-11-09 NOTE — CONSULT NOTE ADULT - SUBJECTIVE AND OBJECTIVE BOX
Nishant Bee MD  Interventional Cardiology / Endovascular Specialist  [place] Office : [address] [place] [address] [place] [address]  Tel:   [place] Office : [address] [facility] [place] [address]  Tel: [phone]      HISTORY OF PRESENTING ILLNESS:  80 y/o M with HTN, severe AS (ORLY 0.8), SSS s/p PPM, and nonobstructive CAD p/w chest pain.  Pt's grandson states that pt has occasional L sided chest pain for which he takes nitroglycerin.  Last night, he developed chest pain which was not relieved by NTG. Grandson also states that pt has been more confused recently.  Pt has difficulty participating in history 2/2 dementia, but states he doest not have CP at this time.  He denies dyspnea, dizziness or palpitations.    	  MEDICATIONS:                  PAST MEDICAL/SURGICAL HISTORY  PAST MEDICAL & SURGICAL HISTORY:  Alzheimer&#x27;s disease      HTN (hypertension)      Prediabetes      Severe aortic stenosis      CAD (coronary artery disease)      Pacemaker          SOCIAL HISTORY: Substance Use (street drugs): ( x ) never used  (  ) other:    FAMILY HISTORY:  No pertinent family history in first degree relatives        REVIEW OF SYSTEMS:  CONSTITUTIONAL: No fever, weight loss, or fatigue  EYES: No eye pain, visual disturbances, or discharge  ENMT:  No difficulty hearing, tinnitus, vertigo; No sinus or throat pain  BREASTS: No pain, masses, or nipple discharge  GASTROINTESTINAL: No abdominal or epigastric pain. No nausea, vomiting, or hematemesis; No diarrhea or constipation. No melena or hematochezia.  GENITOURINARY: No dysuria, frequency, hematuria, or incontinence  NEUROLOGICAL: No headaches, memory loss, loss of strength, numbness, or tremors  ENDOCRINE: No heat or cold intolerance; No hair loss  MUSCULOSKELETAL: No joint pain or swelling; No muscle, back, or extremity pain  PSYCHIATRIC: No depression, anxiety, mood swings, or difficulty sleeping  HEME/LYMPH: No easy bruising, or bleeding gums  All others negative    PHYSICAL EXAM:  T(C): 36.6 (11-09-22 @ 11:11), Max: 36.8 (11-09-22 @ 05:20)  HR: 73 (11-09-22 @ 11:11) (60 - 73)  BP: 133/77 (11-09-22 @ 11:11) (133/77 - 159/74)  RR: 17 (11-09-22 @ 11:11) (16 - 18)  SpO2: 99% (11-09-22 @ 11:11) (99% - 100%)  Wt(kg): --  I&O's Summary        GENERAL: NAD  EYES: EOMI, PERRLA, conjunctiva and sclera clear  ENMT: No tonsillar erythema, exudates, or enlargement  Cardiovascular: Normal S1 S2, No JVD, No murmurs, No edema  Respiratory: Lungs clear to auscultation	  Gastrointestinal:  Soft, Non-tender, + BS	  Extremities: No edema                                   13.4   8.66  )-----------( 208      ( 09 Nov 2022 06:51 )             39.8     11-09    139  |  103  |  13  ----------------------------<  104<H>  3.9   |  25  |  0.69    Ca    9.7      09 Nov 2022 06:51  Phos  2.6     11-09  Mg     2.20     11-09    TPro  7.3  /  Alb  4.0  /  TBili  0.3  /  DBili  x   /  AST  13  /  ALT  14  /  AlkPhos  57  11-08    proBNP: Serum Pro-Brain Natriuretic Peptide: 96 pg/mL (11-08 @ 23:07)    Lipid Profile:   HgA1c:   TSH:     Consultant(s) Notes Reviewed:  [x ] YES  [ ] NO    Care Discussed with Consultants/Other Providers [ x] YES  [ ] NO    Imaging Personally Reviewed independently:  [x] YES  [ ] NO    All labs, radiologic studies, vitals, orders and medications list reviewed. Patient is seen and examined at bedside. Case discussed with medical team.

## 2022-11-09 NOTE — H&P ADULT - NSICDXPASTMEDICALHX_GEN_ALL_CORE_FT
PAST MEDICAL HISTORY:  Alzheimer's disease     CAD (coronary artery disease)     HTN (hypertension)     Prediabetes     Severe aortic stenosis

## 2022-11-09 NOTE — PATIENT PROFILE ADULT - FALL HARM RISK - HARM RISK INTERVENTIONS
Assistance with ambulation/Assistance OOB with selected safe patient handling equipment/Communicate Risk of Fall with Harm to all staff/Discuss with provider need for PT consult/Monitor gait and stability/Provide patient with walking aids - walker, cane, crutches/Reinforce activity limits and safety measures with patient and family/Tailored Fall Risk Interventions/Use of alarms - bed, chair and/or voice tab/Visual Cue: Yellow wristband and red socks/Bed in lowest position, wheels locked, appropriate side rails in place/Call bell, personal items and telephone in reach/Instruct patient to call for assistance before getting out of bed or chair/Non-slip footwear when patient is out of bed/[place] to call system/Physically safe environment - no spills, clutter or unnecessary equipment/Purposeful Proactive Rounding/Room/bathroom lighting operational, light cord in reach

## 2022-11-10 LAB
ANION GAP SERPL CALC-SCNC: 11 MMOL/L — SIGNIFICANT CHANGE UP (ref 7–14)
BUN SERPL-MCNC: 13 MG/DL — SIGNIFICANT CHANGE UP (ref 7–23)
CALCIUM SERPL-MCNC: 9.2 MG/DL — SIGNIFICANT CHANGE UP (ref 8.4–10.5)
CHLORIDE SERPL-SCNC: 103 MMOL/L — SIGNIFICANT CHANGE UP (ref 98–107)
CO2 SERPL-SCNC: 24 MMOL/L — SIGNIFICANT CHANGE UP (ref 22–31)
CREAT SERPL-MCNC: 0.69 MG/DL — SIGNIFICANT CHANGE UP (ref 0.5–1.3)
EGFR: 94 ML/MIN/1.73M2 — SIGNIFICANT CHANGE UP
GLUCOSE SERPL-MCNC: 102 MG/DL — HIGH (ref 70–99)
HCT VFR BLD CALC: 41.1 % — SIGNIFICANT CHANGE UP (ref 39–50)
HGB BLD-MCNC: 13.8 G/DL — SIGNIFICANT CHANGE UP (ref 13–17)
MAGNESIUM SERPL-MCNC: 2.3 MG/DL — SIGNIFICANT CHANGE UP (ref 1.6–2.6)
MCHC RBC-ENTMCNC: 30.6 PG — SIGNIFICANT CHANGE UP (ref 27–34)
MCHC RBC-ENTMCNC: 33.6 GM/DL — SIGNIFICANT CHANGE UP (ref 32–36)
MCV RBC AUTO: 91.1 FL — SIGNIFICANT CHANGE UP (ref 80–100)
NRBC # BLD: 0 /100 WBCS — SIGNIFICANT CHANGE UP (ref 0–0)
NRBC # FLD: 0 K/UL — SIGNIFICANT CHANGE UP (ref 0–0)
PHOSPHATE SERPL-MCNC: 2.8 MG/DL — SIGNIFICANT CHANGE UP (ref 2.5–4.5)
PLATELET # BLD AUTO: 196 K/UL — SIGNIFICANT CHANGE UP (ref 150–400)
POTASSIUM SERPL-MCNC: 3.9 MMOL/L — SIGNIFICANT CHANGE UP (ref 3.5–5.3)
POTASSIUM SERPL-SCNC: 3.9 MMOL/L — SIGNIFICANT CHANGE UP (ref 3.5–5.3)
RBC # BLD: 4.51 M/UL — SIGNIFICANT CHANGE UP (ref 4.2–5.8)
RBC # FLD: 13.9 % — SIGNIFICANT CHANGE UP (ref 10.3–14.5)
SODIUM SERPL-SCNC: 138 MMOL/L — SIGNIFICANT CHANGE UP (ref 135–145)
WBC # BLD: 5.58 K/UL — SIGNIFICANT CHANGE UP (ref 3.8–10.5)
WBC # FLD AUTO: 5.58 K/UL — SIGNIFICANT CHANGE UP (ref 3.8–10.5)

## 2022-11-10 RX ADMIN — Medication 81 MILLIGRAM(S): at 12:08

## 2022-11-10 RX ADMIN — SENNA PLUS 2 TABLET(S): 8.6 TABLET ORAL at 21:12

## 2022-11-10 RX ADMIN — AMLODIPINE BESYLATE 5 MILLIGRAM(S): 2.5 TABLET ORAL at 05:45

## 2022-11-10 RX ADMIN — Medication 25 MILLIGRAM(S): at 17:33

## 2022-11-10 RX ADMIN — Medication 3 MILLIGRAM(S): at 21:12

## 2022-11-10 RX ADMIN — Medication 25 MILLIGRAM(S): at 05:45

## 2022-11-10 NOTE — PROGRESS NOTE ADULT - SUBJECTIVE AND OBJECTIVE BOX
Nishant Bee MD  Interventional Cardiology / Endovascular Specialist  [place] Office : [address] [place] [address] [place] [address]  Tel:   [place] Office : [address] [facility] [place] [address]  Tel: [phone]      Subjective/Overnight events: Patient sitting up in bed comfortably. No acute distress. Denies chest pain, SOB or palpitations  	  MEDICATIONS:  amLODIPine   Tablet 5 milliGRAM(s) Oral daily  aspirin  chewable 81 milliGRAM(s) Oral daily  metoprolol tartrate 25 milliGRAM(s) Oral two times a day        melatonin 3 milliGRAM(s) Oral at bedtime    senna 2 Tablet(s) Oral at bedtime      influenza  Vaccine (HIGH DOSE) 0.7 milliLiter(s) IntraMuscular once      PAST MEDICAL/SURGICAL HISTORY  PAST MEDICAL & SURGICAL HISTORY:  Alzheimer&#x27;s disease      HTN (hypertension)      Prediabetes      Severe aortic stenosis      CAD (coronary artery disease)      Pacemaker          SOCIAL HISTORY: Substance Use (street drugs): ( x ) never used  (  ) other:    FAMILY HISTORY:  No pertinent family history in first degree relatives      PHYSICAL EXAM:  T(C): 36.9 (11-10-22 @ 11:35), Max: 36.9 (11-09-22 @ 21:21)  HR: 91 (11-10-22 @ 11:35) (71 - 91)  BP: 129/53 (11-10-22 @ 11:35) (122/64 - 140/80)  RR: 18 (11-10-22 @ 11:35) (18 - 18)  SpO2: 99% (11-10-22 @ 11:35) (97% - 99%)  Wt(kg): --  I&O's Summary    Height (cm): 154.9 (11-09 @ 18:35)  Weight (kg): 57.2 (11-09 @ 18:35)  BMI (kg/m2): 23.8 (11-09 @ 18:35)  BSA (m2): 1.55 (11-09 @ 18:35)          GENERAL: NAD  EYES: EOMI, PERRLA, conjunctiva and sclera clear  ENMT: No tonsillar erythema, exudates, or enlargement  Cardiovascular: Normal S1 S2, No JVD, No murmurs, No edema  Respiratory: Lungs clear to auscultation	  Gastrointestinal:  Soft, Non-tender, + BS	  Extremities: No edema                                   13.8   5.58  )-----------( 196      ( 10 Nov 2022 06:00 )             41.1     11-10    138  |  103  |  13  ----------------------------<  102<H>  3.9   |  24  |  0.69    Ca    9.2      10 Nov 2022 06:00  Phos  2.8     11-10  Mg     2.30     11-10    TPro  7.3  /  Alb  4.0  /  TBili  0.3  /  DBili  x   /  AST  13  /  ALT  14  /  AlkPhos  57  11-08    proBNP:   Lipid Profile:   HgA1c:   TSH:     Consultant(s) Notes Reviewed:  [x ] YES  [ ] NO    Care Discussed with Consultants/Other Providers [ x] YES  [ ] NO    Imaging Personally Reviewed independently:  [x] YES  [ ] NO    All labs, radiologic studies, vitals, orders and medications list reviewed. Patient is seen and examined at bedside. Case discussed with medical team.

## 2022-11-10 NOTE — PROGRESS NOTE ADULT - SUBJECTIVE AND OBJECTIVE BOX
Date of service: 11-10-22 @ 17:38      Patient is a 81y old  Male who presents with a chief complaint of chest pain (10 Nov 2022 12:13)                                                               INTERVAL HPI/OVERNIGHT EVENTS:    REVIEW OF SYSTEMS:     CONSTITUTIONAL: No weakness, fevers or chills  RESPIRATORY: No cough, wheezing,  No shortness of breath  CARDIOVASCULAR: No chest pain or palpitations  GASTROINTESTINAL: No abdominal pain  . No nausea, vomiting, or hematemesis; No diarrhea or constipation. No melena or hematochezia.  GENITOURINARY: No dysuria, frequency or hematuria  NEUROLOGICAL: No numbness or weakness                                                                                                                                                                                                                                                                                   Medications:  MEDICATIONS  (STANDING):  amLODIPine   Tablet 5 milliGRAM(s) Oral daily  aspirin  chewable 81 milliGRAM(s) Oral daily  influenza  Vaccine (HIGH DOSE) 0.7 milliLiter(s) IntraMuscular once  melatonin 3 milliGRAM(s) Oral at bedtime  metoprolol tartrate 25 milliGRAM(s) Oral two times a day  senna 2 Tablet(s) Oral at bedtime    MEDICATIONS  (PRN):       Allergies    No Known Allergies    Intolerances      Vital Signs Last 24 Hrs  T(C): 36.9 (10 Nov 2022 11:35), Max: 36.9 (2022 21:21)  T(F): 98.4 (10 Nov 2022 11:35), Max: 98.5 (2022 21:21)  HR: 91 (10 Nov 2022 11:35) (71 - 91)  BP: 129/53 (10 Nov 2022 11:35) (122/64 - 140/80)  BP(mean): --  RR: 18 (10 Nov 2022 11:35) (18 - 18)  SpO2: 99% (10 Nov 2022 11:35) (97% - 99%)    Parameters below as of 10 Nov 2022 11:35  Patient On (Oxygen Delivery Method): room air      CAPILLARY BLOOD GLUCOSE          Physical Exam:    Daily Height in cm: 154.94 (2022 18:35)    Daily Weight in k.2 (2022 18:35)  General:  Well appearing, NAD, not cachetic  HEENT:  Nonicteric, PERRLA  CV:  RRR, S1S2   Lungs:  CTA B/L, no wheezes, rales, rhonchi  Abdomen:  Soft, non-tender, no distended, positive BS  Extremities:  no edema                                                                                                                                                                                                                                                                                                 LABS:                               13.8   5.58  )-----------( 196      ( 10 Nov 2022 06:00 )             41.1                      11-10    138  |  103  |  13  ----------------------------<  102<H>  3.9   |  24  |  0.69    Ca    9.2      10 Nov 2022 06:00  Phos  2.8     11-10  Mg     2.30     11-10    TPro  7.3  /  Alb  4.0  /  TBili  0.3  /  DBili  x   /  AST  13  /  ALT  14  /  AlkPhos  57  11-08                       RADIOLOGY & ADDITIONAL TESTS         I personally reviewed: [  ]EKG   [  ]CXR    [  ] CT      A/P:         Discussed with :     [facility] consultants' Notes   Time spent :

## 2022-11-11 ENCOUNTER — TRANSCRIPTION ENCOUNTER (OUTPATIENT)
Age: 82
End: 2022-11-11

## 2022-11-11 LAB
FOLATE SERPL-MCNC: 7 NG/ML — SIGNIFICANT CHANGE UP (ref 3.1–17.5)
TSH SERPL-MCNC: 0.97 UIU/ML — SIGNIFICANT CHANGE UP (ref 0.27–4.2)
VIT B12 SERPL-MCNC: 384 PG/ML — SIGNIFICANT CHANGE UP (ref 200–900)

## 2022-11-11 RX ADMIN — Medication 81 MILLIGRAM(S): at 18:43

## 2022-11-11 RX ADMIN — AMLODIPINE BESYLATE 5 MILLIGRAM(S): 2.5 TABLET ORAL at 05:19

## 2022-11-11 RX ADMIN — Medication 25 MILLIGRAM(S): at 05:18

## 2022-11-11 RX ADMIN — Medication 3 MILLIGRAM(S): at 21:02

## 2022-11-11 RX ADMIN — Medication 25 MILLIGRAM(S): at 18:43

## 2022-11-11 RX ADMIN — SENNA PLUS 2 TABLET(S): 8.6 TABLET ORAL at 21:02

## 2022-11-11 NOTE — PHYSICAL THERAPY INITIAL EVALUATION ADULT - SITTING BALANCE: DYNAMIC
Problem: Sleep Disturbance  Goal: Adequate Sleep/Rest  Outcome: Ongoing, Progressing   Goal Outcome Evaluation:  Patient appears to have slept for 7 hours. No new/acute concerns overnight, no behavioral concerns. No complaints of pain. No requests or prn's given.                        good minus

## 2022-11-11 NOTE — DISCHARGE NOTE NURSING/CASE MANAGEMENT/SOCIAL WORK - PATIENT PORTAL LINK FT
You can access the FollowMyHealth Patient Portal offered by [facility] by registering at the following website: [email or web address]. By joining [employer]’s FollowMyHealth portal, you will also be able to view your health information using other applications (apps) compatible with our system.

## 2022-11-11 NOTE — PROGRESS NOTE ADULT - SUBJECTIVE AND OBJECTIVE BOX
Date of service: 11-11-22 @ 15:14      Patient is a 81y old  Male who presents with a chief complaint of chest pain (11 Nov 2022 07:41)                                                               INTERVAL HPI/OVERNIGHT EVENTS:    REVIEW OF SYSTEMS:     CONSTITUTIONAL: No weakness, fevers or chills  EYES/ENT: No visual changes , no ear ache   NECK: No pain or stiffness  RESPIRATORY: No cough, wheezing,  No shortness of breath  CARDIOVASCULAR: No chest pain or palpitations  GASTROINTESTINAL: No abdominal pain  . No nausea, vomiting, or hematemesis; No diarrhea or constipation. No melena or hematochezia.  GENITOURINARY: No dysuria, frequency or hematuria  NEUROLOGICAL: No numbness or weakness  SKIN: No itching, burning, rashes, or lesions                                                                                                                                                                                                                                                                                 Medications:  MEDICATIONS  (STANDING):  amLODIPine   Tablet 5 milliGRAM(s) Oral daily  aspirin  chewable 81 milliGRAM(s) Oral daily  influenza  Vaccine (HIGH DOSE) 0.7 milliLiter(s) IntraMuscular once  melatonin 3 milliGRAM(s) Oral at bedtime  metoprolol tartrate 25 milliGRAM(s) Oral two times a day  senna 2 Tablet(s) Oral at bedtime    MEDICATIONS  (PRN):       Allergies    No Known Allergies    Intolerances      Vital Signs Last 24 Hrs  T(C): 36.9 (11 Nov 2022 04:42), Max: 36.9 (10 Nov 2022 21:03)  T(F): 98.4 (11 Nov 2022 04:42), Max: 98.5 (10 Nov 2022 21:03)  HR: 74 (11 Nov 2022 04:42) (70 - 74)  BP: 129/64 (11 Nov 2022 04:42) (121/91 - 136/64)  BP(mean): --  RR: 18 (11 Nov 2022 04:42) (18 - 18)  SpO2: 98% (11 Nov 2022 04:42) (98% - 99%)    Parameters below as of 11 Nov 2022 04:42  Patient On (Oxygen Delivery Method): room air      CAPILLARY BLOOD GLUCOSE          Physical Exam:    Daily     Daily   General:  Well appearing, NAD, not cachetic  HEENT:  Nonicteric, PERRLA  CV:  RRR, S1S2   Lungs:  CTA B/L, no wheezes, rales, rhonchi  Abdomen:  Soft, non-tender, no distended, positive BS  Extremities:  2+ pulses, no c/c, no edema  Skin:  Warm and dry, no rashes  :  No duffy  Neuro:  AAOx3, non-focal, grossly intact                                                                                                                                                                                                                                                                                                LABS:                               13.8   5.58  )-----------( 196      ( 10 Nov 2022 06:00 )             41.1                      11-10    138  |  103  |  13  ----------------------------<  102<H>  3.9   |  24  |  0.69    Ca    9.2      10 Nov 2022 06:00  Phos  2.8     11-10  Mg     2.30     11-10                         RADIOLOGY & ADDITIONAL TESTS         I personally reviewed: [  ]EKG   [  ]CXR    [  ] CT      A/P:         Discussed with :     [facility] consultants' Notes   Time spent :

## 2022-11-11 NOTE — DISCHARGE NOTE PROVIDER - NSDCMRMEDTOKEN_GEN_ALL_CORE_FT
amLODIPine 5 mg oral tablet: 1 tab(s) orally once a day  aspirin 81 mg oral tablet, chewable: 1 tab(s) orally once a day  Melatonin 3 mg oral tablet: 1 tab(s) orally once a day (at bedtime), As Needed  metoprolol tartrate 25 mg oral tablet: 1 tab(s) orally 2 times a day  senna oral tablet: 2 tab(s) orally once a day (at bedtime)   amLODIPine 5 mg oral tablet: 1 tab(s) orally once a day  HOLD for systolic blood pressure less than 100   aspirin 81 mg oral tablet, chewable: 1 tab(s) orally once a day  Melatonin 3 mg oral tablet: 1 tab(s) orally once a day (at bedtime), As Needed for Insomnia   metoprolol tartrate 25 mg oral tablet: 1 tab(s) orally 2 times a day  HOLD for systolic blood pressure less than 100 or heart rate less than 60  senna oral tablet: 2 tab(s) orally once a day (at bedtime)

## 2022-11-11 NOTE — DISCHARGE NOTE PROVIDER - PROVIDER TOKENS
PROVIDER:[TOKEN:[[id number]]],FREE:[LAST:[Routine followup with your primary care doctor],PHONE:[(   )    -],FAX:[(   )    -]]

## 2022-11-11 NOTE — PHYSICAL THERAPY INITIAL EVALUATION ADULT - NSPTDISCHREC_GEN_A_CORE
Home; no skilled PT required; pt appears to be at his baseline; will keep pt on PT program while @ [facility] to prevent weakness/deconditioning.

## 2022-11-11 NOTE — DISCHARGE NOTE PROVIDER - NSDCQMCOGNITION_NEU_ALL_CORE
Difficulty concentrating/Difficulty making decisions Difficulty making decisions/Difficulty remembering

## 2022-11-11 NOTE — PROGRESS NOTE ADULT - SUBJECTIVE AND OBJECTIVE BOX
Nishant Bee MD  Interventional Cardiology / Endovascular Specialist  [place] Office : [address] [place] [address] [address]. [address]  Tel:   [place] Office : [address] [facility] [place] [address]  Tel: [phone]    Subjective/Overnight events: Patient sitting up in bed comfortably. No acute distress. Denies chest pain, SOB or palpitations  	  MEDICATIONS:  amLODIPine   Tablet 5 milliGRAM(s) Oral daily  aspirin  chewable 81 milliGRAM(s) Oral daily  metoprolol tartrate 25 milliGRAM(s) Oral two times a day        melatonin 3 milliGRAM(s) Oral at bedtime    senna 2 Tablet(s) Oral at bedtime      influenza  Vaccine (HIGH DOSE) 0.7 milliLiter(s) IntraMuscular once      PAST MEDICAL/SURGICAL HISTORY  PAST MEDICAL & SURGICAL HISTORY:  Alzheimer&#x27;s disease      HTN (hypertension)      Prediabetes      Severe aortic stenosis      CAD (coronary artery disease)      Pacemaker          SOCIAL HISTORY: Substance Use (street drugs): ( x ) never used  (  ) other:    FAMILY HISTORY:  No pertinent family history in first degree relatives        REVIEW OF SYSTEMS:  CONSTITUTIONAL: No fever, weight loss, or fatigue  EYES: No eye pain, visual disturbances, or discharge  ENMT:  No difficulty hearing, tinnitus, vertigo; No sinus or throat pain  BREASTS: No pain, masses, or nipple discharge  GASTROINTESTINAL: No abdominal or epigastric pain. No nausea, vomiting, or hematemesis; No diarrhea or constipation. No melena or hematochezia.  GENITOURINARY: No dysuria, frequency, hematuria, or incontinence  NEUROLOGICAL: No headaches, memory loss, loss of strength, numbness, or tremors  ENDOCRINE: No heat or cold intolerance; No hair loss  MUSCULOSKELETAL: No joint pain or swelling; No muscle, back, or extremity pain  PSYCHIATRIC: No depression, anxiety, mood swings, or difficulty sleeping  HEME/LYMPH: No easy bruising, or bleeding gums  All others negative    PHYSICAL EXAM:  T(C): 36.9 (11-11-22 @ 04:42), Max: 36.9 (11-10-22 @ 21:03)  HR: 74 (11-11-22 @ 04:42) (70 - 74)  BP: 129/64 (11-11-22 @ 04:42) (121/91 - 136/64)  RR: 18 (11-11-22 @ 04:42) (18 - 18)  SpO2: 98% (11-11-22 @ 04:42) (98% - 99%)  Wt(kg): --  I&O's Summary      GENERAL: NAD  EYES: EOMI, PERRLA, conjunctiva and sclera clear  ENMT: No tonsillar erythema, exudates, or enlargement  Cardiovascular: Normal S1 S2, No JVD, No murmurs, No edema  Respiratory: Lungs clear to auscultation	  Gastrointestinal:  Soft, Non-tender, + BS	  Extremities: No edema                             13.8   5.58  )-----------( 196      ( 10 Nov 2022 06:00 )             41.1     11-10    138  |  103  |  13  ----------------------------<  102<H>  3.9   |  24  |  0.69    Ca    9.2      10 Nov 2022 06:00  Phos  2.8     11-10  Mg     2.30     11-10      proBNP:   Lipid Profile:   HgA1c:   TSH: Thyroid Stimulating Hormone, Serum: 0.97 uIU/mL (11-11 @ 06:14)      Consultant(s) Notes Reviewed:  [x ] YES  [ ] NO    Care Discussed with Consultants/Other Providers [ x] YES  [ ] NO    Imaging Personally Reviewed independently:  [x] YES  [ ] NO    All labs, radiologic studies, vitals, orders and medications list reviewed. Patient is seen and examined at bedside. Case discussed with medical team.

## 2022-11-11 NOTE — DISCHARGE NOTE NURSING/CASE MANAGEMENT/SOCIAL WORK - NSDCFUADDAPPT_GEN_ALL_CORE_FT
You have an electrophysiology followup appt on 11/29 at 11:45am at [address]    If you are in need of a general medicine physician and post-discharge medical follow-up for further care/recommendations you may contact the [facility] for an appointment [phone]

## 2022-11-11 NOTE — PHYSICAL THERAPY INITIAL EVALUATION ADULT - PATIENT PROFILE REVIEW, REHAB EVAL
yes ACTIVITY: BEDREST/Ambulate with Assistance; spoke with RN Traci Delgado prior to PT evaluation--> Pt OK for PT consult/OOB activity./yes

## 2022-11-11 NOTE — DISCHARGE NOTE PROVIDER - NSDCFUSCHEDAPPT_GEN_ALL_CORE_FT
[facility] Physician [facility] [phone]  Scheduled Appointment: 11/29/2022

## 2022-11-11 NOTE — DISCHARGE NOTE PROVIDER - CARE PROVIDER_API CALL
Nishant Bee  CARDIOVASCULAR DISEASE  [address] [place] [address]  Phone: [phone]  Fax: [phone]  Follow Up Time:     Routine followup with your primary care doctor,   Phone: (   )    -  Fax: (   )    -  Follow Up Time:

## 2022-11-11 NOTE — DISCHARGE NOTE PROVIDER - NSDCCPGOAL_GEN_ALL_CORE_FT
Follow hypoglycemia protocol. Have pt eat dinner and re-check blood glucose q15 min until over 100 mg/dL. To get better and follow your care plan as instructed.

## 2022-11-11 NOTE — DISCHARGE NOTE PROVIDER - HOSPITAL COURSE
82 y/o M with HTN, severe AS (ORLY 0.8), SSS s/p PPM, dementia, and nonobstructive CAD p/w chest pain.  Pt's grandson states that pt has occasional L sided chest pain for which he takes nitroglycerin. Last night, he developed chest pain which was not relieved by NTG.  Grandson also states that pt has been more confused recently. A/w acs w/u, trops flat, -[facility] in 4/2021 with non obstructive CAD. TTE was done, showing TTE - Minimal MR, heavily calcified aortic valve, severe AS, mild AR, grossly normal LV sys function, no further w/u per cards as not a candidate previously for TAVR d/t dementia. Per medical attending, cleared for discharge. PT:    On_________, discussed with __________, patient is medically cleared and optimized for discharge today. All medications were reviewed with attending, and sent to mutually agreed upon pharmacy.  Reviewed discharge medications with patient; All new medications requiring new prescription sent to pharmacy of patients choice. Reviewed need for prescription for previous home medications and new prescriptions sent if requested. Patient in agreement and understands.   80 y/o M with HTN, Dementia, severe AS (ORLY 0.8), SSS s/p PPM, and nonobstructive CAD p/w chest pain and EKG changes  Pt's grandson states that pt has occasional L sided chest pain for which he takes nitroglycerin. Pt has difficulty participating in history 2/2 dementia, but Pt states he does not have CP at this time. Patient given ASA 162mg in ED with resolved chest pain, Trops negative. Cardiology consulted, ACS ruled out, recommend no further workup and patient not a surgical candidate for TAVR for severe aortic stenosis. Patient is hemodynamically stable and is medically cleared for discharge to home with hospice services.     Hospital Course:   Chest pain/Angina pectoris.   -ACS ruled out, EKG: paced TWI v4-v6, Trops 11>11, Denies any chest pain currently  -[facility] in 4/2021 with non obstructive CAD   -echo severe AS, grossly normal LV function  -suspect AS contributing to CP and pt is not likely a candidate for intervention   -cont current medical management   -family report that he had been on hospice : deemed not surgical re consult   -Cardiology consulted- no further cardiac workup, CP resolved   -plan for home hospice as per [facility] discussion    Dementia   -zyprexa x1 in ED, stable    Aortic Stenosis  -hx of severe AS, planned for TAVR but patient did not follow up, has dementia deemed not a candidate for TAVR in past   -EP followup appt on 11/29 at 11:45am at [address]    Essential hypertension.   -controlled, c/w metoprolol/amlodipine     +UA  -11/12 UA +LE +9/hpf WBC  (-) bacteria and nitrite, no need for ucx as pt asymptomatic  -Voiding well, PVR normal 11/11, 11/12, 11/13     Dispo: home hospice     On 11/14/22,  results and case discussed with Dr. Thorne, patient is medically cleared and optimized for discharge today. All medications were reviewed with attending, and sent to mutually agreed upon pharmacy (discussed with patient's grandson states pt has all home meds), Patient being discharged home with hospice services.

## 2022-11-11 NOTE — DISCHARGE NOTE PROVIDER - NSDCFUADDAPPT_GEN_ALL_CORE_FT
You have an electrophysiology followup appt on 11/29 at 11:45am at [address]    If you are in need of a general medicine physician and post-discharge medical follow-up for further care/recommendations you may contact the [facility] for an appointment [phone]

## 2022-11-11 NOTE — PHYSICAL THERAPY INITIAL EVALUATION ADULT - WEIGHT-BEARING RESTRICTIONS: GAIT, REHAB EVAL
none full weight-bearing bilateral intermittent alternating exotropia, left worse than right allergy to dogs, cats, seasonal

## 2022-11-11 NOTE — PHYSICAL THERAPY INITIAL EVALUATION ADULT - ADDITIONAL COMMENTS
Pt is a poor historian; as per Care coordinator note, pt lives in a private house with his family with 4 steps to enter; bedroom is on the first floor. Prior to hospital admission pt was ambulating with a single axis cane.    Pt left comfortable in bed, NAD, all lines intact, all precautions maintained, with call bell in reach, bed alarm on, and RN aware of PT evaluation.

## 2022-11-11 NOTE — DISCHARGE NOTE PROVIDER - NSFOLLOWUPCLINICS_GEN_ALL_ED_FT
[facility] [place]  Internal Medicine  [address] [place] [address]  Phone: [phone]  Fax: [phone]

## 2022-11-11 NOTE — PHYSICAL THERAPY INITIAL EVALUATION ADULT - GENERAL OBSERVATIONS, REHAB EVAL
Pt encountered in semisupine position, no distress, AxOx1, with +IV. Pt agreeable to participate in PT evaluation.

## 2022-11-12 LAB
ANION GAP SERPL CALC-SCNC: 9 MMOL/L — SIGNIFICANT CHANGE UP (ref 7–14)
ANISOCYTOSIS BLD QL: SLIGHT — SIGNIFICANT CHANGE UP
APPEARANCE UR: CLEAR — SIGNIFICANT CHANGE UP
BACTERIA # UR AUTO: NEGATIVE — SIGNIFICANT CHANGE UP
BASOPHILS # BLD AUTO: 0 K/UL — SIGNIFICANT CHANGE UP (ref 0–0.2)
BASOPHILS NFR BLD AUTO: 0 % — SIGNIFICANT CHANGE UP (ref 0–2)
BILIRUB UR-MCNC: NEGATIVE — SIGNIFICANT CHANGE UP
BUN SERPL-MCNC: 21 MG/DL — SIGNIFICANT CHANGE UP (ref 7–23)
CALCIUM SERPL-MCNC: 9.3 MG/DL — SIGNIFICANT CHANGE UP (ref 8.4–10.5)
CHLORIDE SERPL-SCNC: 104 MMOL/L — SIGNIFICANT CHANGE UP (ref 98–107)
CO2 SERPL-SCNC: 25 MMOL/L — SIGNIFICANT CHANGE UP (ref 22–31)
COLOR SPEC: YELLOW — SIGNIFICANT CHANGE UP
CREAT SERPL-MCNC: 0.75 MG/DL — SIGNIFICANT CHANGE UP (ref 0.5–1.3)
DIFF PNL FLD: NEGATIVE — SIGNIFICANT CHANGE UP
EGFR: 92 ML/MIN/1.73M2 — SIGNIFICANT CHANGE UP
EOSINOPHIL # BLD AUTO: 0.12 K/UL — SIGNIFICANT CHANGE UP (ref 0–0.5)
EOSINOPHIL NFR BLD AUTO: 2.5 % — SIGNIFICANT CHANGE UP (ref 0–6)
EPI CELLS # UR: 3 /HPF — SIGNIFICANT CHANGE UP (ref 0–5)
GIANT PLATELETS BLD QL SMEAR: PRESENT — SIGNIFICANT CHANGE UP
GLUCOSE SERPL-MCNC: 98 MG/DL — SIGNIFICANT CHANGE UP (ref 70–99)
GLUCOSE UR QL: NEGATIVE — SIGNIFICANT CHANGE UP
HCT VFR BLD CALC: 41.7 % — SIGNIFICANT CHANGE UP (ref 39–50)
HGB BLD-MCNC: 13.9 G/DL — SIGNIFICANT CHANGE UP (ref 13–17)
HYALINE CASTS # UR AUTO: 3 /LPF — SIGNIFICANT CHANGE UP (ref 0–7)
IANC: 2.14 K/UL — SIGNIFICANT CHANGE UP (ref 1.8–7.4)
KETONES UR-MCNC: NEGATIVE — SIGNIFICANT CHANGE UP
LEUKOCYTE ESTERASE UR-ACNC: ABNORMAL
LYMPHOCYTES # BLD AUTO: 1.45 K/UL — SIGNIFICANT CHANGE UP (ref 1–3.3)
LYMPHOCYTES # BLD AUTO: 31.1 % — SIGNIFICANT CHANGE UP (ref 13–44)
MACROCYTES BLD QL: SLIGHT — SIGNIFICANT CHANGE UP
MAGNESIUM SERPL-MCNC: 2.2 MG/DL — SIGNIFICANT CHANGE UP (ref 1.6–2.6)
MCHC RBC-ENTMCNC: 31 PG — SIGNIFICANT CHANGE UP (ref 27–34)
MCHC RBC-ENTMCNC: 33.3 GM/DL — SIGNIFICANT CHANGE UP (ref 32–36)
MCV RBC AUTO: 92.9 FL — SIGNIFICANT CHANGE UP (ref 80–100)
MONOCYTES # BLD AUTO: 0.78 K/UL — SIGNIFICANT CHANGE UP (ref 0–0.9)
MONOCYTES NFR BLD AUTO: 16.8 % — HIGH (ref 2–14)
NEUTROPHILS # BLD AUTO: 2.07 K/UL — SIGNIFICANT CHANGE UP (ref 1.8–7.4)
NEUTROPHILS NFR BLD AUTO: 42 % — LOW (ref 43–77)
NEUTS BAND # BLD: 2.5 % — SIGNIFICANT CHANGE UP (ref 0–6)
NITRITE UR-MCNC: NEGATIVE — SIGNIFICANT CHANGE UP
PH UR: 6 — SIGNIFICANT CHANGE UP (ref 5–8)
PHOSPHATE SERPL-MCNC: 2.1 MG/DL — LOW (ref 2.5–4.5)
PLAT MORPH BLD: ABNORMAL
PLATELET # BLD AUTO: 171 K/UL — SIGNIFICANT CHANGE UP (ref 150–400)
PLATELET COUNT - ESTIMATE: NORMAL — SIGNIFICANT CHANGE UP
POLYCHROMASIA BLD QL SMEAR: SLIGHT — SIGNIFICANT CHANGE UP
POTASSIUM SERPL-MCNC: 4 MMOL/L — SIGNIFICANT CHANGE UP (ref 3.5–5.3)
POTASSIUM SERPL-SCNC: 4 MMOL/L — SIGNIFICANT CHANGE UP (ref 3.5–5.3)
PROT UR-MCNC: ABNORMAL
RBC # BLD: 4.49 M/UL — SIGNIFICANT CHANGE UP (ref 4.2–5.8)
RBC # FLD: 13.7 % — SIGNIFICANT CHANGE UP (ref 10.3–14.5)
RBC BLD AUTO: ABNORMAL
RBC CASTS # UR COMP ASSIST: 5 /HPF — HIGH (ref 0–4)
SODIUM SERPL-SCNC: 138 MMOL/L — SIGNIFICANT CHANGE UP (ref 135–145)
SP GR SPEC: 1.03 — SIGNIFICANT CHANGE UP (ref 1.01–1.05)
UROBILINOGEN FLD QL: SIGNIFICANT CHANGE UP
VARIANT LYMPHS # BLD: 5.1 % — SIGNIFICANT CHANGE UP (ref 0–6)
WBC # BLD: 4.66 K/UL — SIGNIFICANT CHANGE UP (ref 3.8–10.5)
WBC # FLD AUTO: 4.66 K/UL — SIGNIFICANT CHANGE UP (ref 3.8–10.5)
WBC UR QL: 9 /HPF — HIGH (ref 0–5)

## 2022-11-12 RX ORDER — SODIUM,POTASSIUM PHOSPHATES 278-250MG
1 POWDER IN PACKET (EA) ORAL
Refills: 0 | Status: COMPLETED | OUTPATIENT
Start: 2022-11-12 | End: 2022-11-12

## 2022-11-12 RX ADMIN — Medication 1 PACKET(S): at 18:05

## 2022-11-12 RX ADMIN — Medication 25 MILLIGRAM(S): at 18:05

## 2022-11-12 RX ADMIN — Medication 1 PACKET(S): at 12:49

## 2022-11-12 RX ADMIN — Medication 3 MILLIGRAM(S): at 21:02

## 2022-11-12 RX ADMIN — Medication 81 MILLIGRAM(S): at 12:42

## 2022-11-12 RX ADMIN — SENNA PLUS 2 TABLET(S): 8.6 TABLET ORAL at 21:02

## 2022-11-12 NOTE — PROVIDER CONTACT NOTE (OTHER) - REASON
Pt called to ask for a daily bladder scan , page with result
Notify of bladder scan and urine output
pulse oxy discontinue

## 2022-11-12 NOTE — PROVIDER CONTACT NOTE (OTHER) - ASSESSMENT
Pt sitting comfortably, nondistended, nontender and reports no signs of distress; Pt states they urinated throughout shift however he did not urinate in the urinal; before bladder scan, patient urinated 200mL. No complaints at this time; UA sent to lab for testing; Safety maintained

## 2022-11-12 NOTE — PROVIDER CONTACT NOTE (OTHER) - ACTION/TREATMENT ORDERED:
ACP made ware
acp made aware
ACP made aware; Pt sitting comfortably and safety maintained. Pt will be monitored;

## 2022-11-12 NOTE — PROGRESS NOTE ADULT - SUBJECTIVE AND OBJECTIVE BOX
Date of service: 11-12-22 @ 23:43      Patient is a 81y old  Male who presents with a chief complaint of chest pain (12 Nov 2022 13:46)                                                               INTERVAL HPI/OVERNIGHT EVENTS:    REVIEW OF SYSTEMS:     CONSTITUTIONAL: No weakness, fevers or chills  EYES/ENT: No visual changes , no ear ache   NECK: No pain or stiffness  RESPIRATORY: No cough, wheezing,  No shortness of breath  CARDIOVASCULAR: No chest pain or palpitations  GASTROINTESTINAL: No abdominal pain  . No nausea, vomiting, or hematemesis; No diarrhea or constipation. No melena or hematochezia.  GENITOURINARY: No dysuria, frequency or hematuria  NEUROLOGICAL: No numbness or weakness  SKIN: No itching, burning, rashes, or lesions                                                                                                                                                                                                                                                                                 Medications:  MEDICATIONS  (STANDING):  amLODIPine   Tablet 5 milliGRAM(s) Oral daily  aspirin  chewable 81 milliGRAM(s) Oral daily  influenza  Vaccine (HIGH DOSE) 0.7 milliLiter(s) IntraMuscular once  melatonin 3 milliGRAM(s) Oral at bedtime  metoprolol tartrate 25 milliGRAM(s) Oral two times a day  senna 2 Tablet(s) Oral at bedtime    MEDICATIONS  (PRN):       Allergies    No Known Allergies    Intolerances      Vital Signs Last 24 Hrs  T(C): 36.9 (12 Nov 2022 22:03), Max: 36.9 (12 Nov 2022 09:30)  T(F): 98.5 (12 Nov 2022 22:03), Max: 98.5 (12 Nov 2022 22:03)  HR: 64 (12 Nov 2022 22:03) (64 - 73)  BP: 104/58 (12 Nov 2022 22:03) (101/58 - 132/63)  BP(mean): --  RR: 18 (12 Nov 2022 22:03) (16 - 18)  SpO2: 98% (12 Nov 2022 22:03) (98% - 99%)    Parameters below as of 12 Nov 2022 22:03  Patient On (Oxygen Delivery Method): room air      CAPILLARY BLOOD GLUCOSE          11-12 @ 07:01  -  11-12 @ 23:43  --------------------------------------------------------  IN: 0 mL / OUT: 300 mL / NET: -300 mL      Physical Exam:    Daily     Daily   General:  Well appearing, NAD, not cachetic  HEENT:  Nonicteric, PERRLA  CV:  RRR, S1S2   Lungs:  CTA B/L, no wheezes, rales, rhonchi  Abdomen:  Soft, non-tender, no distended, positive BS  Extremities:  2+ pulses, no c/c, no edema  Skin:  Warm and dry, no rashes  :  No duffy  Neuro: NF                                                                                                                                                                                                                                                                                              LABS:                               13.9   4.66  )-----------( 171      ( 12 Nov 2022 05:47 )             41.7                      11-12    138  |  104  |  21  ----------------------------<  98  4.0   |  25  |  0.75    Ca    9.3      12 Nov 2022 05:47  Phos  2.1     11-12  Mg     2.20     11-12                         RADIOLOGY & ADDITIONAL TESTS         I personally reviewed: [  ]EKG   [  ]CXR    [  ] CT      A/P:         Discussed with :     [facility] consultants' Notes   Time spent :

## 2022-11-12 NOTE — PROVIDER CONTACT NOTE (OTHER) - SITUATION
pt take pulse off his finger and when try to place it on pt become agitated and combative towards staff . kicking , screaming and fighting. unable to place pulse oxy on the pt
Notify of bladder scan and urine output
bladder scan result 150

## 2022-11-12 NOTE — PROGRESS NOTE ADULT - SUBJECTIVE AND OBJECTIVE BOX
Nishant Bee MD  Interventional Cardiology / Endovascular Specialist  [place] Office : [address] [place] [address] [place]. [address]  Tel:   [place] Office : [address] [facility] [place] [address]  Tel: [phone]    Subjective/Overnight events: Patient sitting up in bed comfortably. No acute distress. Denies chest pain, SOB or palpitations  	  MEDICATIONS:  amLODIPine   Tablet 5 milliGRAM(s) Oral daily  aspirin  chewable 81 milliGRAM(s) Oral daily  metoprolol tartrate 25 milliGRAM(s) Oral two times a day        melatonin 3 milliGRAM(s) Oral at bedtime    senna 2 Tablet(s) Oral at bedtime      influenza  Vaccine (HIGH DOSE) 0.7 milliLiter(s) IntraMuscular once  potassium phosphate / sodium phosphate Powder (PHOS-NaK) 1 Packet(s) Oral every 2 hours      PAST MEDICAL/SURGICAL HISTORY  PAST MEDICAL & SURGICAL HISTORY:  Alzheimer&#x27;s disease      HTN (hypertension)      Prediabetes      Severe aortic stenosis      CAD (coronary artery disease)      Pacemaker          SOCIAL HISTORY: Substance Use (street drugs): ( x ) never used  (  ) other:    FAMILY HISTORY:  No pertinent family history in first degree relatives        REVIEW OF SYSTEMS:  CONSTITUTIONAL: No fever, weight loss, or fatigue  EYES: No eye pain, visual disturbances, or discharge  ENMT:  No difficulty hearing, tinnitus, vertigo; No sinus or throat pain  BREASTS: No pain, masses, or nipple discharge  GASTROINTESTINAL: No abdominal or epigastric pain. No nausea, vomiting, or hematemesis; No diarrhea or constipation. No melena or hematochezia.  GENITOURINARY: No dysuria, frequency, hematuria, or incontinence  NEUROLOGICAL: No headaches, memory loss, loss of strength, numbness, or tremors  ENDOCRINE: No heat or cold intolerance; No hair loss  MUSCULOSKELETAL: No joint pain or swelling; No muscle, back, or extremity pain  PSYCHIATRIC: No depression, anxiety, mood swings, or difficulty sleeping  HEME/LYMPH: No easy bruising, or bleeding gums  All others negative    PHYSICAL EXAM:  T(C): 36.9 (11-12-22 @ 09:30), Max: 37.6 (11-11-22 @ 16:45)  HR: 65 (11-12-22 @ 09:30) (65 - 81)  BP: 114/64 (11-12-22 @ 09:30) (101/58 - 140/73)  RR: 18 (11-12-22 @ 09:30) (17 - 18)  SpO2: 99% (11-12-22 @ 09:30) (98% - 100%)  Wt(kg): --  I&O's Summary      GENERAL: NAD  EYES: EOMI, PERRLA, conjunctiva and sclera clear  ENMT: No tonsillar erythema, exudates, or enlargement  Cardiovascular: Normal S1 S2, No JVD, No murmurs, No edema  Respiratory: Lungs clear to auscultation	  Gastrointestinal:  Soft, Non-tender, + BS	  Extremities: No edema                                 13.9   4.66  )-----------( 171      ( 12 Nov 2022 05:47 )             41.7     11-12    138  |  104  |  21  ----------------------------<  98  4.0   |  25  |  0.75    Ca    9.3      12 Nov 2022 05:47  Phos  2.1     11-12  Mg     2.20     11-12      proBNP:   Lipid Profile:   HgA1c:   TSH:     Consultant(s) Notes Reviewed:  [x ] YES  [ ] NO    Care Discussed with Consultants/Other Providers [ x] YES  [ ] NO    Imaging Personally Reviewed independently:  [x] YES  [ ] NO    All labs, radiologic studies, vitals, orders and medications list reviewed. Patient is seen and examined at bedside. Case discussed with medical team.

## 2022-11-13 LAB
ANION GAP SERPL CALC-SCNC: 12 MMOL/L — SIGNIFICANT CHANGE UP (ref 7–14)
BUN SERPL-MCNC: 16 MG/DL — SIGNIFICANT CHANGE UP (ref 7–23)
CALCIUM SERPL-MCNC: 9.1 MG/DL — SIGNIFICANT CHANGE UP (ref 8.4–10.5)
CHLORIDE SERPL-SCNC: 103 MMOL/L — SIGNIFICANT CHANGE UP (ref 98–107)
CO2 SERPL-SCNC: 23 MMOL/L — SIGNIFICANT CHANGE UP (ref 22–31)
CREAT SERPL-MCNC: 0.65 MG/DL — SIGNIFICANT CHANGE UP (ref 0.5–1.3)
EGFR: 96 ML/MIN/1.73M2 — SIGNIFICANT CHANGE UP
GLUCOSE SERPL-MCNC: 90 MG/DL — SIGNIFICANT CHANGE UP (ref 70–99)
HCT VFR BLD CALC: 40.7 % — SIGNIFICANT CHANGE UP (ref 39–50)
HGB BLD-MCNC: 13.8 G/DL — SIGNIFICANT CHANGE UP (ref 13–17)
MAGNESIUM SERPL-MCNC: 2.2 MG/DL — SIGNIFICANT CHANGE UP (ref 1.6–2.6)
MCHC RBC-ENTMCNC: 31 PG — SIGNIFICANT CHANGE UP (ref 27–34)
MCHC RBC-ENTMCNC: 33.9 GM/DL — SIGNIFICANT CHANGE UP (ref 32–36)
MCV RBC AUTO: 91.5 FL — SIGNIFICANT CHANGE UP (ref 80–100)
NRBC # BLD: 0 /100 WBCS — SIGNIFICANT CHANGE UP (ref 0–0)
NRBC # FLD: 0 K/UL — SIGNIFICANT CHANGE UP (ref 0–0)
PHOSPHATE SERPL-MCNC: 3.2 MG/DL — SIGNIFICANT CHANGE UP (ref 2.5–4.5)
PLATELET # BLD AUTO: 165 K/UL — SIGNIFICANT CHANGE UP (ref 150–400)
POTASSIUM SERPL-MCNC: 3.7 MMOL/L — SIGNIFICANT CHANGE UP (ref 3.5–5.3)
POTASSIUM SERPL-SCNC: 3.7 MMOL/L — SIGNIFICANT CHANGE UP (ref 3.5–5.3)
RBC # BLD: 4.45 M/UL — SIGNIFICANT CHANGE UP (ref 4.2–5.8)
RBC # FLD: 13.5 % — SIGNIFICANT CHANGE UP (ref 10.3–14.5)
SODIUM SERPL-SCNC: 138 MMOL/L — SIGNIFICANT CHANGE UP (ref 135–145)
WBC # BLD: 4.44 K/UL — SIGNIFICANT CHANGE UP (ref 3.8–10.5)
WBC # FLD AUTO: 4.44 K/UL — SIGNIFICANT CHANGE UP (ref 3.8–10.5)

## 2022-11-13 RX ADMIN — Medication 3 MILLIGRAM(S): at 21:16

## 2022-11-13 RX ADMIN — AMLODIPINE BESYLATE 5 MILLIGRAM(S): 2.5 TABLET ORAL at 05:34

## 2022-11-13 RX ADMIN — Medication 25 MILLIGRAM(S): at 18:26

## 2022-11-13 RX ADMIN — Medication 25 MILLIGRAM(S): at 05:34

## 2022-11-13 RX ADMIN — SENNA PLUS 2 TABLET(S): 8.6 TABLET ORAL at 21:17

## 2022-11-13 RX ADMIN — Medication 81 MILLIGRAM(S): at 12:03

## 2022-11-13 NOTE — PROGRESS NOTE ADULT - SUBJECTIVE AND OBJECTIVE BOX
Nishant Bee MD  Interventional Cardiology / Endovascular Specialist  [place] Office : [address] [place] [address] [place]. [address]  Tel:   [place] Office : [address] [facility] [place] [address]  Tel: [phone]    Subjective/Overnight events: Patient sitting up in bed comfortably. No acute distress. Denies chest pain, SOB or palpitations  	  MEDICATIONS:  amLODIPine   Tablet 5 milliGRAM(s) Oral daily  aspirin  chewable 81 milliGRAM(s) Oral daily  metoprolol tartrate 25 milliGRAM(s) Oral two times a day        melatonin 3 milliGRAM(s) Oral at bedtime    senna 2 Tablet(s) Oral at bedtime      influenza  Vaccine (HIGH DOSE) 0.7 milliLiter(s) IntraMuscular once      PAST MEDICAL/SURGICAL HISTORY  PAST MEDICAL & SURGICAL HISTORY:  Alzheimer&#x27;s disease      HTN (hypertension)      Prediabetes      Severe aortic stenosis      CAD (coronary artery disease)      Pacemaker          SOCIAL HISTORY: Substance Use (street drugs): ( x ) never used  (  ) other:    FAMILY HISTORY:  No pertinent family history in first degree relatives        REVIEW OF SYSTEMS:  CONSTITUTIONAL: No fever, weight loss, or fatigue  EYES: No eye pain, visual disturbances, or discharge  ENMT:  No difficulty hearing, tinnitus, vertigo; No sinus or throat pain  BREASTS: No pain, masses, or nipple discharge  GASTROINTESTINAL: No abdominal or epigastric pain. No nausea, vomiting, or hematemesis; No diarrhea or constipation. No melena or hematochezia.  GENITOURINARY: No dysuria, frequency, hematuria, or incontinence  NEUROLOGICAL: No headaches, memory loss, loss of strength, numbness, or tremors  ENDOCRINE: No heat or cold intolerance; No hair loss  MUSCULOSKELETAL: No joint pain or swelling; No muscle, back, or extremity pain  PSYCHIATRIC: No depression, anxiety, mood swings, or difficulty sleeping  HEME/LYMPH: No easy bruising, or bleeding gums  All others negative    PHYSICAL EXAM:  T(C): 36.8 (11-13-22 @ 10:07), Max: 36.9 (11-12-22 @ 17:32)  HR: 61 (11-13-22 @ 10:07) (61 - 73)  BP: 108/61 (11-13-22 @ 10:07) (104/58 - 132/63)  RR: 18 (11-13-22 @ 10:07) (16 - 18)  SpO2: 99% (11-13-22 @ 10:07) (98% - 99%)  Wt(kg): --  I&O's Summary    12 Nov 2022 07:01  -  13 Nov 2022 07:00  --------------------------------------------------------  IN: 0 mL / OUT: 600 mL / NET: -600 mL      GENERAL: NAD  EYES: EOMI, PERRLA, conjunctiva and sclera clear  ENMT: No tonsillar erythema, exudates, or enlargement  Cardiovascular: Normal S1 S2, No JVD, No murmurs, No edema  Respiratory: Lungs clear to auscultation	  Gastrointestinal:  Soft, Non-tender, + BS	  Extremities: No edema                             13.8   4.44  )-----------( 165      ( 13 Nov 2022 06:05 )             40.7     11-13    138  |  103  |  16  ----------------------------<  90  3.7   |  23  |  0.65    Ca    9.1      13 Nov 2022 06:05  Phos  3.2     11-13  Mg     2.20     11-13      proBNP:   Lipid Profile:   HgA1c:   TSH:     Consultant(s) Notes Reviewed:  [x ] YES  [ ] NO    Care Discussed with Consultants/Other Providers [ x] YES  [ ] NO    Imaging Personally Reviewed independently:  [x] YES  [ ] NO    All labs, radiologic studies, vitals, orders and medications list reviewed. Patient is seen and examined at bedside. Case discussed with medical team.

## 2022-11-13 NOTE — PROGRESS NOTE ADULT - SUBJECTIVE AND OBJECTIVE BOX
Date of service: 11-13-22 @ 21:53      Patient is a 81y old  Male who presents with a chief complaint of chest pain (13 Nov 2022 13:06)                                                               INTERVAL HPI/OVERNIGHT EVENTS:    REVIEW OF SYSTEMS:     CONSTITUTIONAL: No weakness, fevers or chills  EYES/ENT: No visual changes , no ear ache   NECK: No pain or stiffness  RESPIRATORY: No cough, wheezing,  No shortness of breath  CARDIOVASCULAR: No chest pain or palpitations  GASTROINTESTINAL: No abdominal pain  . No nausea, vomiting, or hematemesis; No diarrhea or constipation. No melena or hematochezia.  GENITOURINARY: No dysuria, frequency or hematuria  NEUROLOGICAL: No numbness or weakness  SKIN: No itching, burning, rashes, or lesions                                                                                                                                                                                                                                                                                 Medications:  MEDICATIONS  (STANDING):  amLODIPine   Tablet 5 milliGRAM(s) Oral daily  aspirin  chewable 81 milliGRAM(s) Oral daily  influenza  Vaccine (HIGH DOSE) 0.7 milliLiter(s) IntraMuscular once  melatonin 3 milliGRAM(s) Oral at bedtime  metoprolol tartrate 25 milliGRAM(s) Oral two times a day  senna 2 Tablet(s) Oral at bedtime    MEDICATIONS  (PRN):       Allergies    No Known Allergies    Intolerances      Vital Signs Last 24 Hrs  T(C): 36.9 (13 Nov 2022 21:14), Max: 36.9 (12 Nov 2022 22:03)  T(F): 98.4 (13 Nov 2022 21:14), Max: 98.5 (12 Nov 2022 22:03)  HR: 74 (13 Nov 2022 21:14) (61 - 74)  BP: 124/63 (13 Nov 2022 21:14) (104/58 - 124/63)  BP(mean): --  RR: 18 (13 Nov 2022 21:14) (18 - 18)  SpO2: 97% (13 Nov 2022 21:14) (97% - 99%)    Parameters below as of 13 Nov 2022 21:14  Patient On (Oxygen Delivery Method): room air      CAPILLARY BLOOD GLUCOSE          11-12 @ 07:01  -  11-13 @ 07:00  --------------------------------------------------------  IN: 0 mL / OUT: 600 mL / NET: -600 mL      Physical Exam:    Daily     Daily   General:  Well appearing, NAD, not cachetic  HEENT:  Nonicteric, PERRLA  CV:  RRR, S1S2   Lungs:  CTA B/L, no wheezes, rales, rhonchi  Abdomen:  Soft, non-tender, no distended, positive BS  Extremities:  no edema   Neuro:  NF                                                                                                                                                                                                                                                                                    LABS:                               13.8   4.44  )-----------( 165      ( 13 Nov 2022 06:05 )             40.7                      11-13    138  |  103  |  16  ----------------------------<  90  3.7   |  23  |  0.65    Ca    9.1      13 Nov 2022 06:05  Phos  3.2     11-13  Mg     2.20     11-13                         RADIOLOGY & ADDITIONAL TESTS         I personally reviewed: [  ]EKG   [  ]CXR    [  ] CT      A/P:         Discussed with :     [facility] consultants' Notes   Time spent :

## 2022-11-14 VITALS
DIASTOLIC BLOOD PRESSURE: 66 MMHG | TEMPERATURE: 98 F | SYSTOLIC BLOOD PRESSURE: 127 MMHG | OXYGEN SATURATION: 100 % | HEART RATE: 70 BPM | RESPIRATION RATE: 16 BRPM

## 2022-11-14 DIAGNOSIS — I35.0 NONRHEUMATIC AORTIC (VALVE) STENOSIS: ICD-10-CM

## 2022-11-14 DIAGNOSIS — F03.90 UNSPECIFIED DEMENTIA WITHOUT BEHAVIORAL DISTURBANCE: ICD-10-CM

## 2022-11-14 LAB
ANION GAP SERPL CALC-SCNC: 10 MMOL/L — SIGNIFICANT CHANGE UP (ref 7–14)
BUN SERPL-MCNC: 19 MG/DL — SIGNIFICANT CHANGE UP (ref 7–23)
CALCIUM SERPL-MCNC: 9.4 MG/DL — SIGNIFICANT CHANGE UP (ref 8.4–10.5)
CHLORIDE SERPL-SCNC: 104 MMOL/L — SIGNIFICANT CHANGE UP (ref 98–107)
CO2 SERPL-SCNC: 23 MMOL/L — SIGNIFICANT CHANGE UP (ref 22–31)
CREAT SERPL-MCNC: 0.68 MG/DL — SIGNIFICANT CHANGE UP (ref 0.5–1.3)
EGFR: 95 ML/MIN/1.73M2 — SIGNIFICANT CHANGE UP
GLUCOSE SERPL-MCNC: 92 MG/DL — SIGNIFICANT CHANGE UP (ref 70–99)
HCT VFR BLD CALC: 40.5 % — SIGNIFICANT CHANGE UP (ref 39–50)
HGB BLD-MCNC: 13.5 G/DL — SIGNIFICANT CHANGE UP (ref 13–17)
MAGNESIUM SERPL-MCNC: 2.2 MG/DL — SIGNIFICANT CHANGE UP (ref 1.6–2.6)
MCHC RBC-ENTMCNC: 30.9 PG — SIGNIFICANT CHANGE UP (ref 27–34)
MCHC RBC-ENTMCNC: 33.3 GM/DL — SIGNIFICANT CHANGE UP (ref 32–36)
MCV RBC AUTO: 92.7 FL — SIGNIFICANT CHANGE UP (ref 80–100)
NRBC # BLD: 0 /100 WBCS — SIGNIFICANT CHANGE UP (ref 0–0)
NRBC # FLD: 0 K/UL — SIGNIFICANT CHANGE UP (ref 0–0)
PHOSPHATE SERPL-MCNC: 3.1 MG/DL — SIGNIFICANT CHANGE UP (ref 2.5–4.5)
PLATELET # BLD AUTO: 167 K/UL — SIGNIFICANT CHANGE UP (ref 150–400)
POTASSIUM SERPL-MCNC: 4 MMOL/L — SIGNIFICANT CHANGE UP (ref 3.5–5.3)
POTASSIUM SERPL-SCNC: 4 MMOL/L — SIGNIFICANT CHANGE UP (ref 3.5–5.3)
RBC # BLD: 4.37 M/UL — SIGNIFICANT CHANGE UP (ref 4.2–5.8)
RBC # FLD: 13.4 % — SIGNIFICANT CHANGE UP (ref 10.3–14.5)
SODIUM SERPL-SCNC: 137 MMOL/L — SIGNIFICANT CHANGE UP (ref 135–145)
WBC # BLD: 4.03 K/UL — SIGNIFICANT CHANGE UP (ref 3.8–10.5)
WBC # FLD AUTO: 4.03 K/UL — SIGNIFICANT CHANGE UP (ref 3.8–10.5)

## 2022-11-14 RX ORDER — LANOLIN ALCOHOL/MO/W.PET/CERES
1 CREAM (GRAM) TOPICAL
Qty: 0 | Refills: 0 | DISCHARGE

## 2022-11-14 RX ORDER — METOPROLOL TARTRATE 50 MG
1 TABLET ORAL
Qty: 60 | Refills: 0
Start: 2022-11-14 | End: 2022-12-13

## 2022-11-14 RX ORDER — AMLODIPINE BESYLATE 2.5 MG/1
1 TABLET ORAL
Qty: 30 | Refills: 0
Start: 2022-11-14 | End: 2022-12-13

## 2022-11-14 RX ADMIN — Medication 81 MILLIGRAM(S): at 11:52

## 2022-11-14 RX ADMIN — Medication 25 MILLIGRAM(S): at 05:00

## 2022-11-14 RX ADMIN — AMLODIPINE BESYLATE 5 MILLIGRAM(S): 2.5 TABLET ORAL at 05:00

## 2022-11-14 RX ADMIN — Medication 25 MILLIGRAM(S): at 17:49

## 2022-11-14 NOTE — PROGRESS NOTE ADULT - SUBJECTIVE AND OBJECTIVE BOX
Nishant Bee MD  Interventional Cardiology / Advance Heart Failure and Cardiac Transplant Specialist  [place] Office : [address] [place] [address] [place]  Tel:   [place] Office : [address] [facility] [place] [address]  Tel: [phone]      Subjective/Overnight events: Pt is sitting up in chair comfortable not in distress, no chest pains no SOB no palpitations  	  MEDICATIONS:  amLODIPine   Tablet 5 milliGRAM(s) Oral daily  aspirin  chewable 81 milliGRAM(s) Oral daily  metoprolol tartrate 25 milliGRAM(s) Oral two times a day        melatonin 3 milliGRAM(s) Oral at bedtime    senna 2 Tablet(s) Oral at bedtime      influenza  Vaccine (HIGH DOSE) 0.7 milliLiter(s) IntraMuscular once      PAST MEDICAL/SURGICAL HISTORY  PAST MEDICAL & SURGICAL HISTORY:  Alzheimer&#x27;s disease      HTN (hypertension)      Prediabetes      Severe aortic stenosis      CAD (coronary artery disease)      Pacemaker          SOCIAL HISTORY: Substance Use (street drugs): ( x ) never used  (  ) other:    FAMILY HISTORY:  No pertinent family history in first degree relatives        PHYSICAL EXAM:  T(C): 36.5 (11-14-22 @ 04:59), Max: 36.9 (11-13-22 @ 21:14)  HR: 63 (11-14-22 @ 04:59) (63 - 74)  BP: 125/75 (11-14-22 @ 04:59) (124/63 - 125/75)  RR: 18 (11-14-22 @ 04:59) (18 - 18)  SpO2: 100% (11-14-22 @ 04:59) (97% - 100%)  Wt(kg): --  I&O's Summary        GENERAL: NAD  EYES: EOMI, PERRLA, conjunctiva and sclera clear  ENMT: No tonsillar erythema, exudates, or enlargement  Cardiovascular: Normal S1 S2, No JVD, No murmurs, No edema  Respiratory: Lungs clear to auscultation	  Gastrointestinal:  Soft, Non-tender, + BS	  Extremities: No edema                               13.5   4.03  )-----------( 167      ( 14 Nov 2022 06:05 )             40.5     11-14    137  |  104  |  19  ----------------------------<  92  4.0   |  23  |  0.68    Ca    9.4      14 Nov 2022 06:05  Phos  3.1     11-14  Mg     2.20     11-14      proBNP:   Lipid Profile:   HgA1c:   TSH:     Consultant(s) Notes Reviewed:  [x ] YES  [ ] NO    Care Discussed with Consultants/Other Providers [ x] YES  [ ] NO    Imaging Personally Reviewed independently:  [x] YES  [ ] NO    All labs, radiologic studies, vitals, orders and medications list reviewed. Patient is seen and examined at bedside. Case discussed with medical team.         Nishant Bee MD  Interventional Cardiology / Advance Heart Failure and Cardiac Transplant Specialist  [place] Office : [address] [place] [address] [place] [address]  Tel:   [place] Office : [address] [facility] [place] [address]  Tel: [phone]      Subjective/Overnight events: Pt is sitting up in chair comfortable not in distress, no chest pains no SOB no palpitations  	  MEDICATIONS:  amLODIPine   Tablet 5 milliGRAM(s) Oral daily  aspirin  chewable 81 milliGRAM(s) Oral daily  metoprolol tartrate 25 milliGRAM(s) Oral two times a day        melatonin 3 milliGRAM(s) Oral at bedtime    senna 2 Tablet(s) Oral at bedtime      influenza  Vaccine (HIGH DOSE) 0.7 milliLiter(s) IntraMuscular once      PAST MEDICAL/SURGICAL HISTORY  PAST MEDICAL & SURGICAL HISTORY:  Alzheimer&#x27;s disease      HTN (hypertension)      Prediabetes      Severe aortic stenosis      CAD (coronary artery disease)      Pacemaker          SOCIAL HISTORY: Substance Use (street drugs): ( x ) never used  (  ) other:    FAMILY HISTORY:  No pertinent family history in first degree relatives        PHYSICAL EXAM:  T(C): 36.5 (11-14-22 @ 04:59), Max: 36.9 (11-13-22 @ 21:14)  HR: 63 (11-14-22 @ 04:59) (63 - 74)  BP: 125/75 (11-14-22 @ 04:59) (124/63 - 125/75)  RR: 18 (11-14-22 @ 04:59) (18 - 18)  SpO2: 100% (11-14-22 @ 04:59) (97% - 100%)  Wt(kg): --  I&O's Summary        GENERAL: NAD  EYES: EOMI, PERRLA, conjunctiva and sclera clear  ENMT: No tonsillar erythema, exudates, or enlargement  Cardiovascular: Normal S1 S2, No JVD, 2/6 systolic murmurs, No edema  Respiratory: Lungs clear to auscultation	  Gastrointestinal:  Soft, Non-tender, + BS	  Extremities: No edema                               13.5   4.03  )-----------( 167      ( 14 Nov 2022 06:05 )             40.5     11-14    137  |  104  |  19  ----------------------------<  92  4.0   |  23  |  0.68    Ca    9.4      14 Nov 2022 06:05  Phos  3.1     11-14  Mg     2.20     11-14      proBNP:   Lipid Profile:   HgA1c:   TSH:     Consultant(s) Notes Reviewed:  [x ] YES  [ ] NO    Care Discussed with Consultants/Other Providers [ x] YES  [ ] NO    Imaging Personally Reviewed independently:  [x] YES  [ ] NO    All labs, radiologic studies, vitals, orders and medications list reviewed. Patient is seen and examined at bedside. Case discussed with medical team.

## 2022-11-14 NOTE — PROGRESS NOTE ADULT - ASSESSMENT
82 y/o M with HTN, severe AS (ORLY 0.8), SSS s/p PPM, and nonobstructive CAD p/w chest pain.     EKG: paced TWI v4-v6      1. Chest pain  -trops 11-  -[facility] in 4/2021 with non obstructive CAD   -echo severe AS, grossly normal LV function    2. Aortic Stenosis  -hx of severe AS, planned for TAVR but patient did not follow up, has dementia deemed not a candidate for TAVR in past     3. HTN  -controlled  -continue to monitor BP 
82 y/o M with HTN, severe AS (ORLY 0.8), SSS s/p PPM, and nonobstructive CAD p/w chest pain and EKG changes.      chest pain : ACSruled out   -[facility] in 4/2021 with non obstructive CAD   -echo severe AS, grossly normal LV function...   suspect AS contributing to CP and pt is not likely a candidate for intervention   cont current management   family report that he had been on hospice : deemed not surgical re consult         
80 y/o M with HTN, severe AS (ORLY 0.8), SSS s/p PPM, and nonobstructive CAD p/w chest pain.     EKG: paced TWI v4-v6      1. Chest pain  -trops 11-  -[facility] in 4/2021 with non obstructive CAD   -echo severe AS, grossly normal LV function    2. Aortic Stenosis  -hx of severe AS, planned for TAVR but patient did not follow up, has dementia deemed not a candidate for TAVR in past     3. HTN  -controlled  -continue to monitor BP 
82 y/o M with HTN, severe AS (ORLY 0.8), SSS s/p PPM, and nonobstructive CAD p/w chest pain.     EKG: paced TWI v4-v6      1. Chest pain  -trops 11-  -[facility] in 4/2021 with non obstructive CAD   -echo severe AS, grossly normal LV function  -resolved    2. Aortic Stenosis  -hx of severe AS, planned for TAVR but patient did not follow up, has dementia deemed not a candidate for TAVR in past     3. HTN  -controlled  -continue to monitor BP 
82 y/o M with HTN, severe AS (ORLY 0.8), SSS s/p PPM, and nonobstructive CAD p/w chest pain.     EKG: paced TWI v4-v6      1. Chest pain  -trops 11-  -[facility] in 4/2021 with non obstructive CAD   -echo severe AS, grossly normal LV function    2. Aortic Stenosis  -hx of severe AS, planned for TAVR but patient did not follow up, has dementia deemed not a candidate for TAVR in past     3. HTN  -controlled  -continue to monitor BP 
82 y/o M with HTN, severe AS (ORLY 0.8), SSS s/p PPM, and nonobstructive CAD p/w chest pain.     EKG: paced TWI v4-v6      1. Chest pain  -trops 11-  -[facility] in 4/2021 with non obstructive CAD   -echo severe AS, grossly normal LV function    2. Aortic Stenosis  -hx of severe AS, planned for TAVR but patient did not follow up, has dementia deemed not a candidate for TAVR in past     3. HTN  -controlled  -continue to monitor BP 
80 y/o M with HTN, severe AS (ORLY 0.8), SSS s/p PPM, and nonobstructive CAD p/w chest pain and EKG changes.      chest pain : ACSruled out   -[facility] in 4/2021 with non obstructive CAD   -echo severe AS, grossly normal LV function...   suspect AS contributing to CP and pt is not likely a candidate for intervention ?  pt reportedly had been on hospice .. will refer again   cont current management   MOLST : kamilla with fam      
82 y/o M with HTN, severe AS (ORLY 0.8), SSS s/p PPM, and nonobstructive CAD p/w chest pain and EKG changes.      chest pain : ACSruled out   -C in 4/2021 with non obstructive CAD   -echo severe AS, grossly normal LV function...   suspect AS contributing to CP and pt is not likely a candidate for intervention ?  pt reportedly had been on hospice .. will refer again   cont current management   family report that he had been on hospice : deemed not surgical re consult         
82 y/o M with HTN, severe AS (ORLY 0.8), SSS s/p PPM, and nonobstructive CAD p/w chest pain and EKG changes.      chest pain : ACSruled out   -[facility] in 4/2021 with non obstructive CAD   -echo severe AS, grossly normal LV function...   suspect AS contributing to CP and pt is not likely a candidate for intervention ?  pt reportedly had been on hospice .. will refer again   cont current management   MOLST : kamilla with fam

## 2022-11-14 NOTE — CHART NOTE - NSCHARTNOTEFT_GEN_A_CORE
On 11/14/22, results and case was discussed with Dr. Thorne this afternoon, patient is medically cleared and optimized for discharge today. No need for urine culture per attending as patient is asymptomatic. All medications were reviewed with attending, and sent to mutually agreed upon pharmacy >continue current regimen. As per CM patient accepted to home with hospice services approved by Dr. Sanz and can be discharged home, equipment to be delivered. Writer spoke with patient's Grandson Maged and discussed discharge instructions, all questions answered, states he will pickup patient after work ~7pm, patient's grandson states pt has all home meds.

## 2022-11-14 NOTE — PROGRESS NOTE ADULT - SUBJECTIVE AND OBJECTIVE BOX
pt stable for dc   called grandson and left message  d/w CM and pa   d/c >35   d/c to home w hospice   see dc summary

## 2022-11-29 ENCOUNTER — NON-APPOINTMENT (OUTPATIENT)
Age: 82
End: 2022-11-29

## 2022-11-29 ENCOUNTER — EMERGENCY (EMERGENCY)
Facility: HOSPITAL | Age: 82
LOS: 1 days | Discharge: HOME | End: 2022-11-29
Attending: EMERGENCY MEDICINE
Payer: COMMERCIAL

## 2022-11-29 ENCOUNTER — APPOINTMENT (OUTPATIENT)
Dept: ELECTROPHYSIOLOGY | Facility: CLINIC | Age: 82
End: 2022-11-29

## 2022-11-29 VITALS
HEART RATE: 66 BPM | DIASTOLIC BLOOD PRESSURE: 76 MMHG | RESPIRATION RATE: 16 BRPM | WEIGHT: 145.06 LBS | OXYGEN SATURATION: 99 % | SYSTOLIC BLOOD PRESSURE: 151 MMHG | HEIGHT: 65 IN | TEMPERATURE: 98 F

## 2022-11-29 VITALS
HEART RATE: 70 BPM | DIASTOLIC BLOOD PRESSURE: 78 MMHG | OXYGEN SATURATION: 98 % | SYSTOLIC BLOOD PRESSURE: 148 MMHG | TEMPERATURE: 98 F | RESPIRATION RATE: 17 BRPM

## 2022-11-29 DIAGNOSIS — Z95.0 PRESENCE OF CARDIAC PACEMAKER: Chronic | ICD-10-CM

## 2022-11-29 LAB
ALBUMIN SERPL ELPH-MCNC: 4.3 G/DL — SIGNIFICANT CHANGE UP (ref 3.3–5)
ALP SERPL-CCNC: 72 U/L — SIGNIFICANT CHANGE UP (ref 40–120)
ALT FLD-CCNC: 21 U/L — SIGNIFICANT CHANGE UP (ref 10–45)
ANION GAP SERPL CALC-SCNC: 12 MMOL/L — SIGNIFICANT CHANGE UP (ref 5–17)
AST SERPL-CCNC: 19 U/L — SIGNIFICANT CHANGE UP (ref 10–40)
BASE EXCESS BLDV CALC-SCNC: 2.3 MMOL/L — SIGNIFICANT CHANGE UP (ref -2–3)
BASOPHILS # BLD AUTO: 0.02 K/UL — SIGNIFICANT CHANGE UP (ref 0–0.2)
BASOPHILS NFR BLD AUTO: 0.2 % — SIGNIFICANT CHANGE UP (ref 0–2)
BILIRUB SERPL-MCNC: 0.5 MG/DL — SIGNIFICANT CHANGE UP (ref 0.2–1.2)
BUN SERPL-MCNC: 12 MG/DL — SIGNIFICANT CHANGE UP (ref 7–23)
CA-I SERPL-SCNC: 1.24 MMOL/L — SIGNIFICANT CHANGE UP (ref 1.15–1.33)
CALCIUM SERPL-MCNC: 9.7 MG/DL — SIGNIFICANT CHANGE UP (ref 8.4–10.5)
CHLORIDE BLDV-SCNC: 103 MMOL/L — SIGNIFICANT CHANGE UP (ref 96–108)
CHLORIDE SERPL-SCNC: 104 MMOL/L — SIGNIFICANT CHANGE UP (ref 96–108)
CO2 BLDV-SCNC: 28 MMOL/L — HIGH (ref 22–26)
CO2 SERPL-SCNC: 24 MMOL/L — SIGNIFICANT CHANGE UP (ref 22–31)
CREAT SERPL-MCNC: 0.91 MG/DL — SIGNIFICANT CHANGE UP (ref 0.5–1.3)
EGFR: 86 ML/MIN/1.73M2 — SIGNIFICANT CHANGE UP
EOSINOPHIL # BLD AUTO: 0.09 K/UL — SIGNIFICANT CHANGE UP (ref 0–0.5)
EOSINOPHIL NFR BLD AUTO: 0.9 % — SIGNIFICANT CHANGE UP (ref 0–6)
FLUAV AG NPH QL: SIGNIFICANT CHANGE UP
FLUBV AG NPH QL: SIGNIFICANT CHANGE UP
GAS PNL BLDV: 139 MMOL/L — SIGNIFICANT CHANGE UP (ref 136–145)
GAS PNL BLDV: SIGNIFICANT CHANGE UP
GLUCOSE BLDV-MCNC: 111 MG/DL — HIGH (ref 70–99)
GLUCOSE SERPL-MCNC: 116 MG/DL — HIGH (ref 70–99)
HCO3 BLDV-SCNC: 26 MMOL/L — SIGNIFICANT CHANGE UP (ref 22–29)
HCT VFR BLD CALC: 41.2 % — SIGNIFICANT CHANGE UP (ref 39–50)
HCT VFR BLDA CALC: 43 % — SIGNIFICANT CHANGE UP (ref 39–51)
HGB BLD CALC-MCNC: 14.3 G/DL — SIGNIFICANT CHANGE UP (ref 12.6–17.4)
HGB BLD-MCNC: 13.8 G/DL — SIGNIFICANT CHANGE UP (ref 13–17)
IMM GRANULOCYTES NFR BLD AUTO: 0.4 % — SIGNIFICANT CHANGE UP (ref 0–0.9)
LACTATE BLDV-MCNC: 3 MMOL/L — HIGH (ref 0.5–2)
LIDOCAIN IGE QN: 29 U/L — SIGNIFICANT CHANGE UP (ref 7–60)
LYMPHOCYTES # BLD AUTO: 2.67 K/UL — SIGNIFICANT CHANGE UP (ref 1–3.3)
LYMPHOCYTES # BLD AUTO: 26.6 % — SIGNIFICANT CHANGE UP (ref 13–44)
MCHC RBC-ENTMCNC: 30.7 PG — SIGNIFICANT CHANGE UP (ref 27–34)
MCHC RBC-ENTMCNC: 33.5 GM/DL — SIGNIFICANT CHANGE UP (ref 32–36)
MCV RBC AUTO: 91.6 FL — SIGNIFICANT CHANGE UP (ref 80–100)
MONOCYTES # BLD AUTO: 1.41 K/UL — HIGH (ref 0–0.9)
MONOCYTES NFR BLD AUTO: 14 % — SIGNIFICANT CHANGE UP (ref 2–14)
NEUTROPHILS # BLD AUTO: 5.82 K/UL — SIGNIFICANT CHANGE UP (ref 1.8–7.4)
NEUTROPHILS NFR BLD AUTO: 57.9 % — SIGNIFICANT CHANGE UP (ref 43–77)
NRBC # BLD: 0 /100 WBCS — SIGNIFICANT CHANGE UP (ref 0–0)
PCO2 BLDV: 39 MMHG — LOW (ref 42–55)
PH BLDV: 7.44 — HIGH (ref 7.32–7.43)
PLATELET # BLD AUTO: 194 K/UL — SIGNIFICANT CHANGE UP (ref 150–400)
PO2 BLDV: 26 MMHG — SIGNIFICANT CHANGE UP (ref 25–45)
POTASSIUM BLDV-SCNC: 3.8 MMOL/L — SIGNIFICANT CHANGE UP (ref 3.5–5.1)
POTASSIUM SERPL-MCNC: 3.8 MMOL/L — SIGNIFICANT CHANGE UP (ref 3.5–5.3)
POTASSIUM SERPL-SCNC: 3.8 MMOL/L — SIGNIFICANT CHANGE UP (ref 3.5–5.3)
PROT SERPL-MCNC: 7.9 G/DL — SIGNIFICANT CHANGE UP (ref 6–8.3)
RBC # BLD: 4.5 M/UL — SIGNIFICANT CHANGE UP (ref 4.2–5.8)
RBC # FLD: 13.7 % — SIGNIFICANT CHANGE UP (ref 10.3–14.5)
RSV RNA NPH QL NAA+NON-PROBE: SIGNIFICANT CHANGE UP
SAO2 % BLDV: 46.5 % — LOW (ref 67–88)
SARS-COV-2 RNA SPEC QL NAA+PROBE: SIGNIFICANT CHANGE UP
SODIUM SERPL-SCNC: 140 MMOL/L — SIGNIFICANT CHANGE UP (ref 135–145)
TROPONIN T, HIGH SENSITIVITY RESULT: 10 NG/L — SIGNIFICANT CHANGE UP (ref 0–51)
WBC # BLD: 10.05 K/UL — SIGNIFICANT CHANGE UP (ref 3.8–10.5)
WBC # FLD AUTO: 10.05 K/UL — SIGNIFICANT CHANGE UP (ref 3.8–10.5)

## 2022-11-29 PROCEDURE — 85018 HEMOGLOBIN: CPT

## 2022-11-29 PROCEDURE — 93294 REM INTERROG EVL PM/LDLS PM: CPT

## 2022-11-29 PROCEDURE — 83690 ASSAY OF LIPASE: CPT

## 2022-11-29 PROCEDURE — 87637 SARSCOV2&INF A&B&RSV AMP PRB: CPT

## 2022-11-29 PROCEDURE — 99285 EMERGENCY DEPT VISIT HI MDM: CPT | Mod: 25

## 2022-11-29 PROCEDURE — 82803 BLOOD GASES ANY COMBINATION: CPT

## 2022-11-29 PROCEDURE — 82565 ASSAY OF CREATININE: CPT

## 2022-11-29 PROCEDURE — 93010 ELECTROCARDIOGRAM REPORT: CPT

## 2022-11-29 PROCEDURE — 84295 ASSAY OF SERUM SODIUM: CPT

## 2022-11-29 PROCEDURE — 84132 ASSAY OF SERUM POTASSIUM: CPT

## 2022-11-29 PROCEDURE — 99285 EMERGENCY DEPT VISIT HI MDM: CPT

## 2022-11-29 PROCEDURE — 80053 COMPREHEN METABOLIC PANEL: CPT

## 2022-11-29 PROCEDURE — 93296 REM INTERROG EVL PM/IDS: CPT

## 2022-11-29 PROCEDURE — 83605 ASSAY OF LACTIC ACID: CPT

## 2022-11-29 PROCEDURE — 82947 ASSAY GLUCOSE BLOOD QUANT: CPT

## 2022-11-29 PROCEDURE — 71045 X-RAY EXAM CHEST 1 VIEW: CPT

## 2022-11-29 PROCEDURE — 84484 ASSAY OF TROPONIN QUANT: CPT

## 2022-11-29 PROCEDURE — 93005 ELECTROCARDIOGRAM TRACING: CPT

## 2022-11-29 PROCEDURE — 83880 ASSAY OF NATRIURETIC PEPTIDE: CPT

## 2022-11-29 PROCEDURE — 82330 ASSAY OF CALCIUM: CPT

## 2022-11-29 PROCEDURE — 71045 X-RAY EXAM CHEST 1 VIEW: CPT | Mod: 26

## 2022-11-29 PROCEDURE — 82435 ASSAY OF BLOOD CHLORIDE: CPT

## 2022-11-29 PROCEDURE — 85025 COMPLETE CBC W/AUTO DIFF WBC: CPT

## 2022-11-29 PROCEDURE — 85014 HEMATOCRIT: CPT

## 2022-11-29 RX ORDER — ACETAMINOPHEN 500 MG
975 TABLET ORAL ONCE
Refills: 0 | Status: COMPLETED | OUTPATIENT
Start: 2022-11-29 | End: 2022-11-29

## 2022-11-29 RX ADMIN — Medication 975 MILLIGRAM(S): at 19:03

## 2022-11-29 RX ADMIN — Medication 975 MILLIGRAM(S): at 18:15

## 2022-11-29 NOTE — ED CLERICAL - NS ED CLERK NOTE PRE-ARRIVAL INFORMATION; PCP CONTACT INFORMATION
Number for [facility]: [phone] Number for [facility]: [phone] Number for [facility]: [phone]

## 2022-11-29 NOTE — ED PROVIDER NOTE - CLINICAL SUMMARY MEDICAL DECISION MAKING FREE TEXT BOX
81 ylo male with PMHx of HTN, Aortic stenosis, Alzheimer's dementia (baseline A&Ox2) presents complaining of chest pain since this morning. Differential diagnosis includes but is not limited to ACS, valvular disease, pneumonia, pleuritis. would get labs, ecg, cxr. Pt already received asa and nitro w/o relief. Will give pt morphine as needed. 81 ylo male with PMHx of HTN, Aortic stenosis, Alzheimer's dementia (baseline A&Ox2) presents complaining of chest pain since this morning. Differential diagnosis includes but is not limited to ACS, valvular disease, pneumonia, pleuritis. would get labs, ecg, cxr. Pt already received asa and nitro w/o relief. Will give pt morphine as needed.    Meeta Ansari MD - Attending Physician: Pt here with severe AS, dementia, on Hospice here with CP today. Very well appearing, comfortable on arrival, VSS. Exam nonfocal, mild erythema possible early rash/zoster. Less likely ACS. Not c/w pna. Labs, XR, EKG, pain control if needed

## 2022-11-29 NOTE — ED PROVIDER NOTE - NSFOLLOWUPINSTRUCTIONS_ED_ALL_ED_FT
Please follow up with your primary care physician within 2-3 days.   Return to the ER for any new or concerning symptoms.   You may take 650 mg acetaminophen every eight hours as needed for pain.   Drink plenty of fluids and rest. Please follow up with your primary care physician within 2-3 days.   Return to the ER for any new or concerning symptoms.   You may take 650 mg acetaminophen every eight hours as needed for pain.   Drink plenty of fluids and rest.    Chest Pain    Chest pain can be caused by many different conditions which may or may not be dangerous. Causes include heartburn, lung infections, heart attack, blood clot in lungs, skin infections, strain or damage to muscle, cartilage, or bones, etc. In addition to a history and physical examination, an electrocardiogram (ECG) or other lab tests may have been performed to determine the cause of your chest pain. Follow up with your primary care provider or with a cardiologist as instructed.     SEEK IMMEDIATE MEDICAL CARE IF YOU HAVE ANY OF THE FOLLOWING SYMPTOMS: worsening chest pain, coughing up blood, unexplained back/neck/jaw pain, severe abdominal pain, dizziness or lightheadedness, fainting, shortness of breath, sweaty or clammy skin, vomiting, or racing heart beat. These symptoms may represent a serious problem that is an emergency. Do not wait to see if the symptoms will go away. Get medical help right away. Call 911 and do not drive yourself to the hospital.

## 2022-11-29 NOTE — ED ADULT NURSE NOTE - BP NONINVASIVE SYSTOLIC (MM HG)
Received [facility] paperwork and DR. Delcid will fill it out. [facility] paperwork placed on top of patients chart.   142

## 2022-11-29 NOTE — ED PROVIDER NOTE - OBJECTIVE STATEMENT
81 ylo male with PMHx of HTN, Aortic stenosis, dementia (baseline A&Ox2) presents complaining of chest pain since this morning. Pain was a 9/10 relieved by ASA, morphine and nitroglycerine given by EMS. Patient denies any N/V/D, SOB, fever, chills, sick contacts. 81 ylo male with PMHx of HTN, Aortic stenosis, Alzheimer's dementia (baseline A&Ox2) presents complaining of chest pain since this morning. Pain was a 9/10 relieved by ASA, morphine and 3x nitroglycerine given by EMS. Patient denies any N/V/D, SOB, fever, chills, sick contacts.    PT lives at home with his nephew. No sick contacts.     Pt on hospice with [facility] for acute diastolic hf.

## 2022-11-29 NOTE — ED CLERICAL - NS ED CLERK NOTE PRE-ARRIVAL INFORMATION; ADDITIONAL PRE-ARRIVAL INFORMATION
CC/Reason For referral: Hospice patient. Nonrheumatic AV stenosis. Pt. c/o CP unrelieved with nitroglycerin. Morphine given.   Preferred Consultant(if applicable):  Who admits for you (if needed):  Do you have documents you would like to fax over?  Would you still like to speak to an ED attending? Yes

## 2022-11-29 NOTE — ED ADULT NURSE NOTE - OBJECTIVE STATEMENT
82 yo M AOx2, AOx2 at baseline BIB EMS from home with PMH of dementia, HTN, and aortic stenosis c/o left sided non radiating chest pain. As per pt's aid, she came to his house finding him sitting on a chair in severe c/p prompting the arrival of EMS. As per EMS, pt given  mg at 1545 with partial relief. Pt states he has a pacemaker. Pt denies history of prior chest pain. Pt denies sob, abdominal pain, N/V/D, fever, chills, constipation, blood in urine or stool. 80 yo M AOx2, AOx2 at baseline BIB EMS from home with PMH of dementia, HTN, and aortic stenosis c/o left sided non radiating chest pain. As per pt's aid, she came to his house finding him sitting on a chair in severe c/p prompting the arrival of EMS. As per EMS, pt given  mg at 1545 with partial relief. Pt states he has a pacemaker. Pt denies history of prior chest pain. Pt denies sob, abdominal pain, N/V/D, fever, chills, constipation, blood in urine or stool.

## 2022-11-29 NOTE — ED ADULT NURSE NOTE - PAIN RATING/NUMBER SCALE (0-10): REST
----- Message from Sharona Holm MD sent at 7/6/2021 12:32 PM EDT -----  Please tell him that this was normal and that he can discuss this with Torin Nieto at follow-up
LMOM
9

## 2022-11-29 NOTE — ED PROVIDER NOTE - PHYSICAL EXAMINATION
Gen: well appearing, at baseline AOx2, NAD  Heart: S1, S2 normal   Lungs: Clear to auscultation B/L, no rales/ronchi  Abd: soft, non tender, non distended  Ext: warm, pulses 2+, cap refill <2 seconds  Neuro: no focal deficits  Skin: mild erythema above site of pacemaker Gen: well appearing, NAD  Head: Atraumatic, normocephalic  Eyes: EOM grossly in tact, no scleral icterus, no discharge  ENT: moist mucous membranes  Heart: S1, S2 normal   Lungs: Clear to auscultation B/L, no rales/ronchi  Abd: soft, non tender, non distended, no masses  Ext: warm, pulses 2+, cap refill <2 seconds  Neuro: no focal deficits, AO2, MAEx4  Skin: mild erythema on left chest, no lesions or rashes

## 2022-11-29 NOTE — ED PROVIDER NOTE - PROGRESS NOTE DETAILS
Suki Fletcher, PGY-2, EM: S/W Pat at the hosp[facility] network who explains pt is on hospice with home services. PCU team was contact by their administration. PT has DNR/DNI medical therapy is not limited. To reach hospice team call [phone] Suki Fletcher, PGY-2, EM: S/W Pat at the hospice network who explains pt is on hospice with home services. PCU team was contact by their administration. PT has DNR/DNI medical therapy is not limited. To reach hospice team call [phone] Suki Fletcher, PGY-2, EM: S/W Pat at the hospice network who explains pt is on hospice with home services. PCU team was contact by their administration. PT has DNR/DNI medical therapy is not limited. To reach hospice team call [phone] Avinash Cardenas PA: patient signed out by previous team. labs and imaging reviewed. patient to be discharged and follow up with his primary doctor regarding today's visit. strict return precautions discussed. patient sitting comfortably with no medical complaints at this time. spoke with patients family. will arrange to  patient. discussed with Dr. Ansari.

## 2022-11-29 NOTE — ED ADULT NURSE NOTE - NSIMPLEMENTINTERV_GEN_ALL_ED
Implemented All Universal Safety Interventions:  [place] to call system. Call bell, personal items and telephone within reach. Instruct patient to call for assistance. Room bathroom lighting operational. Non-slip footwear when patient is off stretcher. Physically safe environment: no spills, clutter or unnecessary equipment. Stretcher in lowest position, wheels locked, appropriate side rails in place. Implemented All Universal Safety Interventions:  [place] to call system. Call bell, personal items and telephone within reach. Instruct patient to call for assistance. Room bathroom lighting operational. Non-slip footwear when patient is off stretcher. Physically safe environment: no spills, clutter or unnecessary equipment. Stretcher in lowest position, wheels locked, appropriate side rails in place. Implemented All Universal Safety Interventions:  [place] to call system. Call bell, personal items and telephone within reach. Instruct patient to call for assistance. Room bathroom lighting operational. Non-slip footwear when patient is off stretcher. Physically safe environment: no spills, clutter or unnecessary equipment. Stretcher in lowest position, wheels locked, appropriate side rails in place.

## 2022-11-29 NOTE — ED PROVIDER NOTE - PATIENT PORTAL LINK FT
You can access the FollowMyHealth Patient Portal offered by [facility] by registering at the following website: [email or web address]. By joining [employer]’s FollowMyHealth portal, you will also be able to view your health information using other applications (apps) compatible with our system. You can access the FollowMyHealth Patient Portal offered by [facility] by registering at the following website: [email or web address]. By joining [employer]’s FollowMyHealth portal, you will also be able to view your health information using other applications (apps) compatible with our system. You can access the FollowMyHealth Patient Portal offered by [facility] by registering at the following website: [email or web address]. By joining [employer]’s FollowMyHealth portal, you will also be able to view your health information using other applications (apps) compatible with our system.

## 2022-11-30 PROBLEM — I25.10 ATHEROSCLEROTIC HEART DISEASE OF NATIVE CORONARY ARTERY WITHOUT ANGINA PECTORIS: Chronic | Status: ACTIVE | Noted: 2022-11-09

## 2022-12-01 NOTE — H&P ADULT - BIRTH SEX
PATIENT INSTRUCTIONS:      Given by:   Nurse    Instructed in:  If yes, include date/comment and person who did the instructions       A.D.L:       NA                Activity:      Yes      Ok to resume previous activity as tolerated       Diet::          Yes        Ok to resume previous diet as tolerated        Medication:  Yes  Please complete entire course of antibiotics even if symptoms improve    Equipment:  NA    Treatment:  NA      Other:          Yes  Please return to the ER if your child has difficulty breathing, symptoms worsen, or you have concerns for their health.     Education Class:  NA    Patient/Family verbalized/demonstrated understanding of above Instructions:  yes  __________________________________________________________________________    OBJECTIVE CHECKLIST  Patient/Family has:    All medications brought from home   NA  Valuables from safe                            NA  Prescriptions                                       NA  All personal belongings                       Yes  Equipment (oxygen, apnea monitor, wheelchair)     NA  Other: NA    Rehabilitation Follow-up: NA    Special Needs on Discharge (Specify) NA    Amoxicillin; Clavulanic Acid oral suspension  What is this medicine?  AMOXICILLIN; CLAVULANIC ACID (a mox i SILL in; TREVA bob ic AS id) is a penicillin antibiotic. It is used to treat certain kinds of bacterial infections. It will not work for colds, flu, or other viral infections.  This medicine may be used for other purposes; ask your health care provider or pharmacist if you have questions.  COMMON BRAND NAME(S): Amoclan, Augmentin, Augmentin ES  What should I tell my health care provider before I take this medicine?  They need to know if you have any of these conditions:  bowel disease, like colitis  kidney disease  liver disease  mononucleosis  phenylketonuria  an unusual or allergic reaction to amoxicillin, penicillin, cephalosporin, other antibiotics, clavulanic acid,  

## 2023-02-28 ENCOUNTER — FORM ENCOUNTER (OUTPATIENT)
Age: 83
End: 2023-02-28

## 2023-02-28 ENCOUNTER — APPOINTMENT (OUTPATIENT)
Dept: ELECTROPHYSIOLOGY | Facility: CLINIC | Age: 83
End: 2023-02-28

## 2023-03-21 NOTE — ED PROVIDER NOTE - CLINICAL SUMMARY MEDICAL DECISION MAKING FREE TEXT BOX
Detail Level: Detailed
I discussed the goals of care with the granddaughter regarding whether we should admit the pt for chest pain, or whether we should rule it out, or whether we should aggressively treat for BP. Granddaughter states she will contact her mother who is the healthcare decision maker. For now will obtain basic blood work and CXR, and enhance supervision.

## 2023-03-28 ENCOUNTER — FORM ENCOUNTER (OUTPATIENT)
Age: 83
End: 2023-03-28

## 2023-03-28 ENCOUNTER — APPOINTMENT (OUTPATIENT)
Dept: ELECTROPHYSIOLOGY | Facility: CLINIC | Age: 83
End: 2023-03-28

## 2023-05-03 ENCOUNTER — APPOINTMENT (OUTPATIENT)
Dept: ELECTROPHYSIOLOGY | Facility: CLINIC | Age: 83
End: 2023-05-03

## 2023-05-03 ENCOUNTER — FORM ENCOUNTER (OUTPATIENT)
Age: 83
End: 2023-05-03

## 2023-05-15 ENCOUNTER — EMERGENCY (EMERGENCY)
Facility: HOSPITAL | Age: 83
LOS: 1 days | Discharge: HOME | End: 2023-05-15
Attending: STUDENT IN AN ORGANIZED HEALTH CARE EDUCATION/TRAINING PROGRAM | Admitting: STUDENT IN AN ORGANIZED HEALTH CARE EDUCATION/TRAINING PROGRAM
Payer: MEDICARE

## 2023-05-15 VITALS
HEART RATE: 85 BPM | OXYGEN SATURATION: 98 % | SYSTOLIC BLOOD PRESSURE: 140 MMHG | DIASTOLIC BLOOD PRESSURE: 60 MMHG | RESPIRATION RATE: 18 BRPM | TEMPERATURE: 98 F

## 2023-05-15 DIAGNOSIS — Z95.0 PRESENCE OF CARDIAC PACEMAKER: Chronic | ICD-10-CM

## 2023-05-15 PROCEDURE — 99284 EMERGENCY DEPT VISIT MOD MDM: CPT

## 2023-05-15 NOTE — ED ADULT NURSE NOTE - OBJECTIVE STATEMENT
Pt presents ot the ED with daughter due to bilateral eye pain, and drainage from eye. Daughter reports symptoms started a week ago, and have been getting worse. Pt alert and oriented x4. Pt ambulates with assistance. Hx of HTN, DM. Pt assessed by MD Ceron. Artificial eye drops provided. Pt discharged to home. Pt provided with eye clinic number to make outpatient appointment. Carol

## 2023-05-15 NOTE — ED PROVIDER NOTE - ATTENDING CONTRIBUTION TO CARE
Dr. Siegel, Attending Physician-  I performed a face to face bedside interview with patient regarding history of present illness, review of symptoms and past medical history. I completed an independent physical exam.  I have discussed patient's plan of care with the fellow.    82M, DM2, who presents w/ R eye discomfort. Does not wear contacts/eye-glasses. For the past 3 days, reports R sided lower eyelid discomfort. No pain with EOM. No purulent discharge. No changes in visual acuity. No trauma. No headache. On ROS, denies headaches, fevers, chills, cough, sputum, cp, sob, abdominal pain, nvd, dysuria, hematuria, recent travel, trauma, syncope, black/bloody stools. Physical: normal conjunctiva, oral mucosa moist, R lower eyelid inverted to where eyelashes rest on sclera, otherwise no focal area of infx/no s/o orbital cellulitis, no proptosis, SWATHI bilaterally, no purulent discharge noted, globe not hard to touch. Plan: will contact ophtho for close outpatient f/u - no evidence of bacterial infection/trauma at this time.

## 2023-05-15 NOTE — ED PROVIDER NOTE - PROGRESS NOTE DETAILS
[facility]sm[facility] Med Tox Fellow: spoke with hank, state [facility] clinic will be aware of pt and that pt likely has entropion and can have artificial tears/ointment until he gets appointment.

## 2023-05-15 NOTE — ED PROVIDER NOTE - NSFOLLOWUPINSTRUCTIONS_ED_ALL_ED_FT
- Please follow up with Ophthalmology clinic at [facility] within the week. Call to make an appointment, they are expecting a phone call at [phone].     - Apply artificial tear drops to Right eye, two drops, 4-5 times a day until you follow up with Opthalmology.     - Be sure to return to the ED if you develop new, worsening, or any distressing symptoms.

## 2023-05-15 NOTE — ED ADULT NURSE NOTE - NSFALLRISKINTERV_ED_ALL_ED

## 2023-05-15 NOTE — ED PROVIDER NOTE - PHYSICAL EXAMINATION
GENERAL: no acute distress, non-toxic appearing  HEENT: normal conjunctiva, oral mucosa moist, R lower eyelid inverted to where eyelashes rest on sclera, otherwise no focal area of infx/no s/o orbital cellulitis, no proptosis, SWATHI bilaterally, no purulent discharge noted  CARDIAC: regular rate and regular rhythm, normal S1 and S2, no appreciable murmurs  PULM: clear to ascultation bilaterally, no increased wob  GI: abdomen nondistended, soft, nontender  : no suprapubic tenderness  NEURO: alert and oriented x 3, normal speech, moving all extremities without lateralization  MSK: no visible deformities, no peripheral edema  SKIN: no visible rashes  PSYCH: appropriate mood and affect

## 2023-05-15 NOTE — ED PROVIDER NOTE - PATIENT PORTAL LINK FT
You can access the FollowMyHealth Patient Portal offered by [facility] by registering at the following website: [email or web address]. By joining [employer]’s FollowMyHealth portal, you will also be able to view your health information using other applications (apps) compatible with our system.

## 2023-05-15 NOTE — ED PROVIDER NOTE - CLINICAL SUMMARY MEDICAL DECISION MAKING FREE TEXT BOX
Osmany Med Tox Fellow: likely entropion, no s/o orbital cellulitis/hordeolum/stye, spoke with optho they will see pt in clinic this week at [facility]. Will send with artificial tears.

## 2023-05-15 NOTE — ED PROVIDER NOTE - OBJECTIVE STATEMENT
82M PMH DM2 presents to the ED with R eye pain to R lower eyelid for the past 3 days associated with some discharge. Denies any fevers/chills, uri sxs, changes in vision (pt denies any change in vision despite triage note), purulent discharge. Denies any trauma to the area. 82M PMH DM2 presents to the ED with R eye pain to R lower eyelid for the past 3 days. Denies any fevers/chills, uri sxs, changes in vision (pt denies any change in vision despite triage note), purulent discharge. Denies any trauma to the area.

## 2023-05-15 NOTE — ED ADULT TRIAGE NOTE - CHIEF COMPLAINT QUOTE
p.t living with DM type 2, c/o of rt eye pain and discharge for few days, denies any trauma, c/o of blurry vision as well

## 2023-06-07 ENCOUNTER — APPOINTMENT (OUTPATIENT)
Dept: ELECTROPHYSIOLOGY | Facility: CLINIC | Age: 83
End: 2023-06-07

## 2023-07-31 ENCOUNTER — APPOINTMENT (OUTPATIENT)
Dept: ELECTROPHYSIOLOGY | Facility: CLINIC | Age: 83
End: 2023-07-31
Payer: MEDICARE

## 2023-07-31 ENCOUNTER — NON-APPOINTMENT (OUTPATIENT)
Age: 83
End: 2023-07-31

## 2023-07-31 DIAGNOSIS — Z87.898 PERSONAL HISTORY OF OTHER SPECIFIED CONDITIONS: ICD-10-CM

## 2023-07-31 PROCEDURE — 93280 PM DEVICE PROGR EVAL DUAL: CPT

## 2023-07-31 RX ORDER — METFORMIN HYDROCHLORIDE 500 MG/1
500 TABLET, COATED ORAL
Refills: 0 | Status: DISCONTINUED | COMMUNITY
End: 2023-07-31

## 2023-07-31 RX ORDER — ATORVASTATIN CALCIUM 40 MG/1
40 TABLET, FILM COATED ORAL DAILY
Refills: 0 | Status: DISCONTINUED | COMMUNITY
End: 2023-07-31

## 2023-07-31 RX ORDER — SENNOSIDES 8.6 MG/1
8.6 CAPSULE, GELATIN COATED ORAL
Refills: 0 | Status: ACTIVE | COMMUNITY

## 2023-07-31 RX ORDER — PANTOPRAZOLE 40 MG/1
40 TABLET, DELAYED RELEASE ORAL
Refills: 0 | Status: DISCONTINUED | COMMUNITY
End: 2023-07-31

## 2024-08-01 ENCOUNTER — APPOINTMENT (OUTPATIENT)
Dept: ELECTROPHYSIOLOGY | Facility: CLINIC | Age: 84
End: 2024-08-01

## 2025-01-04 NOTE — ED PROVIDER NOTE - WR ORDER NAME 1
Prior Authorization required on Lisdexamfetamine 30mg  Insurance Phone [phone]  Patient ID [id number]  Please contact the pharmacy with Prior Auth status (approved/denied)    Thank you  [facility] La[facility]  [phone]     Xray Chest 1 View- PORTABLE-Urgent